# Patient Record
Sex: FEMALE | Race: BLACK OR AFRICAN AMERICAN | NOT HISPANIC OR LATINO
[De-identification: names, ages, dates, MRNs, and addresses within clinical notes are randomized per-mention and may not be internally consistent; named-entity substitution may affect disease eponyms.]

---

## 2017-01-13 ENCOUNTER — APPOINTMENT (OUTPATIENT)
Dept: OPHTHALMOLOGY | Facility: CLINIC | Age: 72
End: 2017-01-13

## 2017-02-13 ENCOUNTER — OUTPATIENT (OUTPATIENT)
Dept: OUTPATIENT SERVICES | Facility: HOSPITAL | Age: 72
LOS: 1 days | Discharge: HOME | End: 2017-02-13

## 2017-02-13 DIAGNOSIS — C93.10 CHRONIC MYELOMONOCYTIC LEUKEMIA NOT HAVING ACHIEVED REMISSION: ICD-10-CM

## 2017-02-13 DIAGNOSIS — Z98.41 CATARACT EXTRACTION STATUS, RIGHT EYE: Chronic | ICD-10-CM

## 2017-02-15 ENCOUNTER — APPOINTMENT (OUTPATIENT)
Dept: HEMATOLOGY ONCOLOGY | Facility: CLINIC | Age: 72
End: 2017-02-15

## 2017-02-28 ENCOUNTER — APPOINTMENT (OUTPATIENT)
Dept: INTERNAL MEDICINE | Facility: CLINIC | Age: 72
End: 2017-02-28

## 2017-02-28 VITALS
WEIGHT: 162 LBS | SYSTOLIC BLOOD PRESSURE: 144 MMHG | BODY MASS INDEX: 29.81 KG/M2 | HEIGHT: 62 IN | DIASTOLIC BLOOD PRESSURE: 80 MMHG

## 2017-02-28 DIAGNOSIS — M25.512 PAIN IN LEFT SHOULDER: ICD-10-CM

## 2017-02-28 DIAGNOSIS — L84 CORNS AND CALLOSITIES: ICD-10-CM

## 2017-03-01 PROBLEM — M25.512 LEFT SHOULDER PAIN: Status: ACTIVE | Noted: 2017-03-01

## 2017-03-01 LAB
ALBUMIN SERPL ELPH-MCNC: 4.1 G/DL
ALP BLD-CCNC: 47 U/L
ALT SERPL-CCNC: 30 U/L
ANION GAP SERPL CALC-SCNC: 19 MMOL/L
AST SERPL-CCNC: 28 U/L
BILIRUB SERPL-MCNC: 0.6 MG/DL
BUN SERPL-MCNC: 10 MG/DL
CALCIUM SERPL-MCNC: 9.4 MG/DL
CHLORIDE SERPL-SCNC: 102 MMOL/L
CHOLEST SERPL-MCNC: 206 MG/DL
CHOLEST/HDLC SERPL: 2.9 RATIO
CO2 SERPL-SCNC: 25 MMOL/L
CREAT SERPL-MCNC: 0.78 MG/DL
GLUCOSE SERPL-MCNC: 94 MG/DL
HDLC SERPL-MCNC: 71 MG/DL
LDLC SERPL CALC-MCNC: 119 MG/DL
POTASSIUM SERPL-SCNC: 3.5 MMOL/L
PROT SERPL-MCNC: 7 G/DL
SODIUM SERPL-SCNC: 146 MMOL/L
TRIGL SERPL-MCNC: 81 MG/DL

## 2017-03-30 ENCOUNTER — FORM ENCOUNTER (OUTPATIENT)
Age: 72
End: 2017-03-30

## 2017-03-31 ENCOUNTER — OUTPATIENT (OUTPATIENT)
Dept: OUTPATIENT SERVICES | Facility: HOSPITAL | Age: 72
LOS: 1 days | End: 2017-03-31
Payer: MEDICARE

## 2017-03-31 ENCOUNTER — APPOINTMENT (OUTPATIENT)
Dept: ULTRASOUND IMAGING | Facility: IMAGING CENTER | Age: 72
End: 2017-03-31

## 2017-03-31 ENCOUNTER — APPOINTMENT (OUTPATIENT)
Dept: MAMMOGRAPHY | Facility: IMAGING CENTER | Age: 72
End: 2017-03-31

## 2017-03-31 DIAGNOSIS — Z12.31 ENCOUNTER FOR SCREENING MAMMOGRAM FOR MALIGNANT NEOPLASM OF BREAST: ICD-10-CM

## 2017-03-31 DIAGNOSIS — Z98.41 CATARACT EXTRACTION STATUS, RIGHT EYE: Chronic | ICD-10-CM

## 2017-03-31 PROCEDURE — 77067 SCR MAMMO BI INCL CAD: CPT

## 2017-03-31 PROCEDURE — 77063 BREAST TOMOSYNTHESIS BI: CPT

## 2017-03-31 PROCEDURE — 76641 ULTRASOUND BREAST COMPLETE: CPT

## 2017-04-11 ENCOUNTER — APPOINTMENT (OUTPATIENT)
Dept: INTERNAL MEDICINE | Facility: CLINIC | Age: 72
End: 2017-04-11

## 2017-04-11 VITALS
DIASTOLIC BLOOD PRESSURE: 100 MMHG | WEIGHT: 155 LBS | BODY MASS INDEX: 28.52 KG/M2 | HEIGHT: 62 IN | SYSTOLIC BLOOD PRESSURE: 130 MMHG

## 2017-04-11 DIAGNOSIS — R09.82 POSTNASAL DRIP: ICD-10-CM

## 2017-05-19 ENCOUNTER — APPOINTMENT (OUTPATIENT)
Dept: OPHTHALMOLOGY | Facility: CLINIC | Age: 72
End: 2017-05-19

## 2017-06-02 ENCOUNTER — RESULT REVIEW (OUTPATIENT)
Age: 72
End: 2017-06-02

## 2017-06-13 ENCOUNTER — APPOINTMENT (OUTPATIENT)
Dept: INTERNAL MEDICINE | Facility: CLINIC | Age: 72
End: 2017-06-13

## 2017-07-03 ENCOUNTER — RX RENEWAL (OUTPATIENT)
Age: 72
End: 2017-07-03

## 2017-08-14 ENCOUNTER — RESULT CHARGE (OUTPATIENT)
Age: 72
End: 2017-08-14

## 2017-08-15 ENCOUNTER — NON-APPOINTMENT (OUTPATIENT)
Age: 72
End: 2017-08-15

## 2017-08-15 ENCOUNTER — APPOINTMENT (OUTPATIENT)
Dept: INTERNAL MEDICINE | Facility: CLINIC | Age: 72
End: 2017-08-15
Payer: MEDICARE

## 2017-08-15 VITALS
HEIGHT: 62 IN | OXYGEN SATURATION: 95 % | SYSTOLIC BLOOD PRESSURE: 133 MMHG | HEART RATE: 70 BPM | BODY MASS INDEX: 27.97 KG/M2 | WEIGHT: 152 LBS | DIASTOLIC BLOOD PRESSURE: 70 MMHG

## 2017-08-15 LAB
GLUCOSE BLDC GLUCOMTR-MCNC: 109
HBA1C MFR BLD HPLC: 5.8

## 2017-08-15 PROCEDURE — 83036 HEMOGLOBIN GLYCOSYLATED A1C: CPT | Mod: QW

## 2017-08-15 PROCEDURE — 82962 GLUCOSE BLOOD TEST: CPT

## 2017-08-15 PROCEDURE — 99397 PER PM REEVAL EST PAT 65+ YR: CPT | Mod: 25

## 2017-08-20 LAB
ALBUMIN SERPL ELPH-MCNC: 4.5 G/DL
ALP BLD-CCNC: 59 U/L
ALT SERPL-CCNC: 23 U/L
ANION GAP SERPL CALC-SCNC: 17 MMOL/L
AST SERPL-CCNC: 18 U/L
BASOPHILS # BLD AUTO: 0.02 K/UL
BASOPHILS NFR BLD AUTO: 0.4 %
BILIRUB SERPL-MCNC: 1.1 MG/DL
BUN SERPL-MCNC: 9 MG/DL
CALCIUM SERPL-MCNC: 9.5 MG/DL
CHLORIDE SERPL-SCNC: 97 MMOL/L
CHOLEST SERPL-MCNC: 283 MG/DL
CHOLEST/HDLC SERPL: 3.5 RATIO
CO2 SERPL-SCNC: 29 MMOL/L
CREAT SERPL-MCNC: 0.75 MG/DL
EOSINOPHIL # BLD AUTO: 0.04 K/UL
EOSINOPHIL NFR BLD AUTO: 0.7 %
GLUCOSE SERPL-MCNC: 98 MG/DL
HCT VFR BLD CALC: 42.3 %
HDLC SERPL-MCNC: 81 MG/DL
HGB BLD-MCNC: 13.5 G/DL
IMM GRANULOCYTES NFR BLD AUTO: 0.2 %
LDLC SERPL CALC-MCNC: 173 MG/DL
LYMPHOCYTES # BLD AUTO: 2.14 K/UL
LYMPHOCYTES NFR BLD AUTO: 39.6 %
MAN DIFF?: NORMAL
MCHC RBC-ENTMCNC: 28 PG
MCHC RBC-ENTMCNC: 31.9 GM/DL
MCV RBC AUTO: 87.8 FL
MONOCYTES # BLD AUTO: 0.45 K/UL
MONOCYTES NFR BLD AUTO: 8.3 %
NEUTROPHILS # BLD AUTO: 2.75 K/UL
NEUTROPHILS NFR BLD AUTO: 50.8 %
PLATELET # BLD AUTO: 224 K/UL
POTASSIUM SERPL-SCNC: 3.2 MMOL/L
PROT SERPL-MCNC: 7.4 G/DL
RBC # BLD: 4.82 M/UL
RBC # FLD: 15.6 %
SODIUM SERPL-SCNC: 143 MMOL/L
TRIGL SERPL-MCNC: 143 MG/DL
TSH SERPL-ACNC: 1.09 UIU/ML
WBC # FLD AUTO: 5.41 K/UL

## 2017-08-22 ENCOUNTER — MEDICATION RENEWAL (OUTPATIENT)
Age: 72
End: 2017-08-22

## 2017-08-25 ENCOUNTER — OUTPATIENT (OUTPATIENT)
Dept: OUTPATIENT SERVICES | Facility: HOSPITAL | Age: 72
LOS: 1 days | Discharge: HOME | End: 2017-08-25

## 2017-08-25 DIAGNOSIS — C93.10 CHRONIC MYELOMONOCYTIC LEUKEMIA NOT HAVING ACHIEVED REMISSION: ICD-10-CM

## 2017-08-25 DIAGNOSIS — Z98.41 CATARACT EXTRACTION STATUS, RIGHT EYE: Chronic | ICD-10-CM

## 2017-09-01 ENCOUNTER — APPOINTMENT (OUTPATIENT)
Dept: HEMATOLOGY ONCOLOGY | Facility: CLINIC | Age: 72
End: 2017-09-01
Payer: MEDICARE

## 2017-09-01 ENCOUNTER — RESULT REVIEW (OUTPATIENT)
Age: 72
End: 2017-09-01

## 2017-09-01 VITALS
HEART RATE: 81 BPM | WEIGHT: 151.02 LBS | BODY MASS INDEX: 27.62 KG/M2 | DIASTOLIC BLOOD PRESSURE: 91 MMHG | SYSTOLIC BLOOD PRESSURE: 166 MMHG | RESPIRATION RATE: 16 BRPM | TEMPERATURE: 97.7 F | OXYGEN SATURATION: 97 %

## 2017-09-01 LAB
HCT VFR BLD CALC: 43.3 % — SIGNIFICANT CHANGE UP (ref 34.5–45)
HGB BLD-MCNC: 14.8 G/DL — SIGNIFICANT CHANGE UP (ref 11.5–15.5)
MCHC RBC-ENTMCNC: 29.9 PG — SIGNIFICANT CHANGE UP (ref 27–34)
MCHC RBC-ENTMCNC: 34.1 G/DL — SIGNIFICANT CHANGE UP (ref 32–36)
MCV RBC AUTO: 87.5 FL — SIGNIFICANT CHANGE UP (ref 80–100)
PLATELET # BLD AUTO: 207 K/UL — SIGNIFICANT CHANGE UP (ref 150–400)
RBC # BLD: 4.95 M/UL — SIGNIFICANT CHANGE UP (ref 3.8–5.2)
RBC # FLD: 12.9 % — SIGNIFICANT CHANGE UP (ref 10.3–14.5)
WBC # BLD: 5.8 K/UL — SIGNIFICANT CHANGE UP (ref 3.8–10.5)
WBC # FLD AUTO: 5.8 K/UL — SIGNIFICANT CHANGE UP (ref 3.8–10.5)

## 2017-09-01 PROCEDURE — 99215 OFFICE O/P EST HI 40 MIN: CPT

## 2017-10-05 ENCOUNTER — APPOINTMENT (OUTPATIENT)
Dept: OPHTHALMOLOGY | Facility: CLINIC | Age: 72
End: 2017-10-05
Payer: MEDICARE

## 2017-10-05 PROCEDURE — 92083 EXTENDED VISUAL FIELD XM: CPT

## 2017-10-05 PROCEDURE — 92012 INTRM OPH EXAM EST PATIENT: CPT

## 2017-12-23 LAB — HEMOCCULT STL QL IA: NEGATIVE

## 2018-02-19 ENCOUNTER — RESULT CHARGE (OUTPATIENT)
Age: 73
End: 2018-02-19

## 2018-02-20 ENCOUNTER — LABORATORY RESULT (OUTPATIENT)
Age: 73
End: 2018-02-20

## 2018-02-20 ENCOUNTER — NON-APPOINTMENT (OUTPATIENT)
Age: 73
End: 2018-02-20

## 2018-02-20 ENCOUNTER — APPOINTMENT (OUTPATIENT)
Dept: INTERNAL MEDICINE | Facility: CLINIC | Age: 73
End: 2018-02-20
Payer: MEDICARE

## 2018-02-20 VITALS
HEART RATE: 74 BPM | BODY MASS INDEX: 28.52 KG/M2 | HEIGHT: 62 IN | WEIGHT: 155 LBS | SYSTOLIC BLOOD PRESSURE: 136 MMHG | DIASTOLIC BLOOD PRESSURE: 100 MMHG | OXYGEN SATURATION: 98 %

## 2018-02-20 DIAGNOSIS — I49.9 CARDIAC ARRHYTHMIA, UNSPECIFIED: ICD-10-CM

## 2018-02-20 DIAGNOSIS — R92.2 INCONCLUSIVE MAMMOGRAM: ICD-10-CM

## 2018-02-20 PROCEDURE — G0008: CPT

## 2018-02-20 PROCEDURE — 90662 IIV NO PRSV INCREASED AG IM: CPT

## 2018-02-20 PROCEDURE — 83036 HEMOGLOBIN GLYCOSYLATED A1C: CPT | Mod: QW

## 2018-02-20 PROCEDURE — 99214 OFFICE O/P EST MOD 30 MIN: CPT | Mod: 25

## 2018-03-08 ENCOUNTER — MEDICATION RENEWAL (OUTPATIENT)
Age: 73
End: 2018-03-08

## 2018-03-12 ENCOUNTER — OUTPATIENT (OUTPATIENT)
Dept: OUTPATIENT SERVICES | Facility: HOSPITAL | Age: 73
LOS: 1 days | Discharge: HOME | End: 2018-03-12

## 2018-03-12 DIAGNOSIS — Z98.41 CATARACT EXTRACTION STATUS, RIGHT EYE: Chronic | ICD-10-CM

## 2018-03-12 DIAGNOSIS — C93.10 CHRONIC MYELOMONOCYTIC LEUKEMIA NOT HAVING ACHIEVED REMISSION: ICD-10-CM

## 2018-03-14 ENCOUNTER — RESULT REVIEW (OUTPATIENT)
Age: 73
End: 2018-03-14

## 2018-03-14 ENCOUNTER — APPOINTMENT (OUTPATIENT)
Dept: HEMATOLOGY ONCOLOGY | Facility: CLINIC | Age: 73
End: 2018-03-14
Payer: MEDICARE

## 2018-03-14 ENCOUNTER — OUTPATIENT (OUTPATIENT)
Dept: OUTPATIENT SERVICES | Facility: HOSPITAL | Age: 73
LOS: 1 days | End: 2018-03-14
Payer: MEDICARE

## 2018-03-14 VITALS
HEART RATE: 82 BPM | RESPIRATION RATE: 16 BRPM | DIASTOLIC BLOOD PRESSURE: 84 MMHG | SYSTOLIC BLOOD PRESSURE: 180 MMHG | WEIGHT: 155.43 LBS | BODY MASS INDEX: 28.43 KG/M2 | TEMPERATURE: 97.9 F | OXYGEN SATURATION: 98 %

## 2018-03-14 DIAGNOSIS — C92.10 CHRONIC MYELOID LEUKEMIA, BCR/ABL-POSITIVE, NOT HAVING ACHIEVED REMISSION: ICD-10-CM

## 2018-03-14 DIAGNOSIS — Z98.41 CATARACT EXTRACTION STATUS, RIGHT EYE: Chronic | ICD-10-CM

## 2018-03-14 LAB
HCT VFR BLD CALC: 40.3 % — SIGNIFICANT CHANGE UP (ref 34.5–45)
HGB BLD-MCNC: 13.7 G/DL — SIGNIFICANT CHANGE UP (ref 11.5–15.5)
MCHC RBC-ENTMCNC: 29.6 PG — SIGNIFICANT CHANGE UP (ref 27–34)
MCHC RBC-ENTMCNC: 33.9 G/DL — SIGNIFICANT CHANGE UP (ref 32–36)
MCV RBC AUTO: 87.3 FL — SIGNIFICANT CHANGE UP (ref 80–100)
PLATELET # BLD AUTO: 180 K/UL — SIGNIFICANT CHANGE UP (ref 150–400)
RBC # BLD: 4.61 M/UL — SIGNIFICANT CHANGE UP (ref 3.8–5.2)
RBC # FLD: 13.4 % — SIGNIFICANT CHANGE UP (ref 10.3–14.5)
WBC # BLD: 5 K/UL — SIGNIFICANT CHANGE UP (ref 3.8–10.5)
WBC # FLD AUTO: 5 K/UL — SIGNIFICANT CHANGE UP (ref 3.8–10.5)

## 2018-03-14 PROCEDURE — 99215 OFFICE O/P EST HI 40 MIN: CPT

## 2018-03-14 PROCEDURE — 81206 BCR/ABL1 GENE MAJOR BP: CPT

## 2018-03-14 PROCEDURE — G0452: CPT | Mod: 26

## 2018-03-16 LAB — BCR/ABL BY RT - PCR QUANTITATIVE: SIGNIFICANT CHANGE UP

## 2018-04-19 ENCOUNTER — APPOINTMENT (OUTPATIENT)
Dept: OPHTHALMOLOGY | Facility: CLINIC | Age: 73
End: 2018-04-19
Payer: MEDICARE

## 2018-04-19 PROCEDURE — 92250 FUNDUS PHOTOGRAPHY W/I&R: CPT

## 2018-04-19 PROCEDURE — 92014 COMPRE OPH EXAM EST PT 1/>: CPT

## 2018-06-20 ENCOUNTER — APPOINTMENT (OUTPATIENT)
Dept: OPHTHALMOLOGY | Facility: CLINIC | Age: 73
End: 2018-06-20

## 2018-07-12 ENCOUNTER — OUTPATIENT (OUTPATIENT)
Dept: OUTPATIENT SERVICES | Facility: HOSPITAL | Age: 73
LOS: 1 days | Discharge: HOME | End: 2018-07-12

## 2018-07-12 DIAGNOSIS — C93.10 CHRONIC MYELOMONOCYTIC LEUKEMIA NOT HAVING ACHIEVED REMISSION: ICD-10-CM

## 2018-07-12 DIAGNOSIS — Z98.41 CATARACT EXTRACTION STATUS, RIGHT EYE: Chronic | ICD-10-CM

## 2018-07-16 ENCOUNTER — RESULT REVIEW (OUTPATIENT)
Age: 73
End: 2018-07-16

## 2018-07-16 ENCOUNTER — APPOINTMENT (OUTPATIENT)
Dept: HEMATOLOGY ONCOLOGY | Facility: CLINIC | Age: 73
End: 2018-07-16
Payer: MEDICARE

## 2018-07-16 VITALS
RESPIRATION RATE: 16 BRPM | OXYGEN SATURATION: 100 % | BODY MASS INDEX: 28.39 KG/M2 | WEIGHT: 155.2 LBS | DIASTOLIC BLOOD PRESSURE: 87 MMHG | TEMPERATURE: 98.1 F | SYSTOLIC BLOOD PRESSURE: 145 MMHG | HEART RATE: 69 BPM

## 2018-07-16 LAB
HCT VFR BLD CALC: 39.4 % — SIGNIFICANT CHANGE UP (ref 34.5–45)
HGB BLD-MCNC: 13.7 G/DL — SIGNIFICANT CHANGE UP (ref 11.5–15.5)
MCHC RBC-ENTMCNC: 30.7 PG — SIGNIFICANT CHANGE UP (ref 27–34)
MCHC RBC-ENTMCNC: 34.9 G/DL — SIGNIFICANT CHANGE UP (ref 32–36)
MCV RBC AUTO: 88 FL — SIGNIFICANT CHANGE UP (ref 80–100)
PLATELET # BLD AUTO: 208 K/UL — SIGNIFICANT CHANGE UP (ref 150–400)
RBC # BLD: 4.47 M/UL — SIGNIFICANT CHANGE UP (ref 3.8–5.2)
RBC # FLD: 12.7 % — SIGNIFICANT CHANGE UP (ref 10.3–14.5)
WBC # BLD: 5.4 K/UL — SIGNIFICANT CHANGE UP (ref 3.8–10.5)
WBC # FLD AUTO: 5.4 K/UL — SIGNIFICANT CHANGE UP (ref 3.8–10.5)

## 2018-07-16 PROCEDURE — 99215 OFFICE O/P EST HI 40 MIN: CPT

## 2018-07-17 LAB
ALBUMIN SERPL ELPH-MCNC: 4.5 G/DL
ALP BLD-CCNC: 63 U/L
ALT SERPL-CCNC: 44 U/L
ANION GAP SERPL CALC-SCNC: 14 MMOL/L
AST SERPL-CCNC: 31 U/L
BILIRUB SERPL-MCNC: 0.9 MG/DL
BUN SERPL-MCNC: 13 MG/DL
CALCIUM SERPL-MCNC: 9.2 MG/DL
CHLORIDE SERPL-SCNC: 102 MMOL/L
CO2 SERPL-SCNC: 25 MMOL/L
CREAT SERPL-MCNC: 0.88 MG/DL
GLUCOSE SERPL-MCNC: 81 MG/DL
POTASSIUM SERPL-SCNC: 4.1 MMOL/L
PROT SERPL-MCNC: 7 G/DL
SODIUM SERPL-SCNC: 141 MMOL/L

## 2018-07-19 ENCOUNTER — APPOINTMENT (OUTPATIENT)
Dept: INTERNAL MEDICINE | Facility: CLINIC | Age: 73
End: 2018-07-19
Payer: MEDICARE

## 2018-07-19 VITALS
HEIGHT: 62 IN | DIASTOLIC BLOOD PRESSURE: 70 MMHG | HEART RATE: 70 BPM | OXYGEN SATURATION: 98 % | BODY MASS INDEX: 28.34 KG/M2 | SYSTOLIC BLOOD PRESSURE: 136 MMHG | WEIGHT: 154 LBS

## 2018-07-19 PROCEDURE — 99214 OFFICE O/P EST MOD 30 MIN: CPT

## 2018-07-20 NOTE — PHYSICAL EXAM
[No Acute Distress] : no acute distress [Well-Appearing] : well-appearing [Normal Sclera/Conjunctiva] : normal sclera/conjunctiva [EOMI] : extraocular movements intact [Normal Outer Ear/Nose] : the outer ears and nose were normal in appearance [Normal Oropharynx] : the oropharynx was normal [Supple] : supple [No Lymphadenopathy] : no lymphadenopathy [No Respiratory Distress] : no respiratory distress  [Clear to Auscultation] : lungs were clear to auscultation bilaterally [Normal Rate] : normal rate  [Regular Rhythm] : with a regular rhythm [Normal S1, S2] : normal S1 and S2 [No Edema] : there was no peripheral edema [Soft] : abdomen soft [Non Tender] : non-tender [Normal Bowel Sounds] : normal bowel sounds [Normal Anterior Cervical Nodes] : no anterior cervical lymphadenopathy [No CVA Tenderness] : no CVA  tenderness [No Spinal Tenderness] : no spinal tenderness [No Joint Swelling] : no joint swelling [Grossly Normal Strength/Tone] : grossly normal strength/tone [Coordination Grossly Intact] : coordination grossly intact [No Focal Deficits] : no focal deficits [Deep Tendon Reflexes (DTR)] : deep tendon reflexes were 2+ and symmetric [Normal Affect] : the affect was normal [Normal Insight/Judgement] : insight and judgment were intact

## 2018-07-20 NOTE — REVIEW OF SYSTEMS
[Easy Bleeding] : no easy bleeding [Easy Bruising] : no easy bruising [Negative] : Neurological [de-identified] : see hpi

## 2018-07-20 NOTE — HISTORY OF PRESENT ILLNESS
[de-identified] : here for f/u of HTN, HLD.  h/o CML doing well on current regimen. Lipids elevation however she is not interested in taking medication for cholesterol. \par mildly elevated A1c in past.\par hasn’t gotten the mammo yet but wants to go. Also now willing to go ahead and get sono of liver.\par Her daughter passed away recently - has been difficult for her and extended family.  has been hard to do much while they were all struggling with the initial grief. - did started seeing grief counselor last week and will continue to do so as it is helping.

## 2018-07-20 NOTE — PLAN
[FreeTextEntry1] : ordered mammo and sono in February - will update order to today and print out for her.

## 2018-08-03 ENCOUNTER — OTHER (OUTPATIENT)
Age: 73
End: 2018-08-03

## 2018-08-09 ENCOUNTER — FORM ENCOUNTER (OUTPATIENT)
Age: 73
End: 2018-08-09

## 2018-08-10 ENCOUNTER — APPOINTMENT (OUTPATIENT)
Dept: ULTRASOUND IMAGING | Facility: IMAGING CENTER | Age: 73
End: 2018-08-10
Payer: MEDICARE

## 2018-08-10 ENCOUNTER — APPOINTMENT (OUTPATIENT)
Dept: MAMMOGRAPHY | Facility: IMAGING CENTER | Age: 73
End: 2018-08-10
Payer: MEDICARE

## 2018-08-10 ENCOUNTER — OUTPATIENT (OUTPATIENT)
Dept: OUTPATIENT SERVICES | Facility: HOSPITAL | Age: 73
LOS: 1 days | End: 2018-08-10
Payer: MEDICARE

## 2018-08-10 DIAGNOSIS — Z00.00 ENCOUNTER FOR GENERAL ADULT MEDICAL EXAMINATION WITHOUT ABNORMAL FINDINGS: ICD-10-CM

## 2018-08-10 DIAGNOSIS — Z98.41 CATARACT EXTRACTION STATUS, RIGHT EYE: Chronic | ICD-10-CM

## 2018-08-10 PROCEDURE — 76641 ULTRASOUND BREAST COMPLETE: CPT | Mod: 26,50

## 2018-08-10 PROCEDURE — 77067 SCR MAMMO BI INCL CAD: CPT | Mod: 26

## 2018-08-10 PROCEDURE — 77067 SCR MAMMO BI INCL CAD: CPT

## 2018-08-10 PROCEDURE — 77063 BREAST TOMOSYNTHESIS BI: CPT | Mod: 26

## 2018-08-10 PROCEDURE — 77063 BREAST TOMOSYNTHESIS BI: CPT

## 2018-08-10 PROCEDURE — 76641 ULTRASOUND BREAST COMPLETE: CPT

## 2018-08-15 ENCOUNTER — RX RENEWAL (OUTPATIENT)
Age: 73
End: 2018-08-15

## 2018-09-27 ENCOUNTER — APPOINTMENT (OUTPATIENT)
Dept: INTERNAL MEDICINE | Facility: CLINIC | Age: 73
End: 2018-09-27

## 2018-09-27 ENCOUNTER — APPOINTMENT (OUTPATIENT)
Dept: INTERNAL MEDICINE | Facility: CLINIC | Age: 73
End: 2018-09-27
Payer: MEDICARE

## 2018-09-27 VITALS
WEIGHT: 153 LBS | SYSTOLIC BLOOD PRESSURE: 116 MMHG | BODY MASS INDEX: 27.98 KG/M2 | HEART RATE: 68 BPM | DIASTOLIC BLOOD PRESSURE: 70 MMHG | OXYGEN SATURATION: 98 %

## 2018-09-27 PROCEDURE — 99213 OFFICE O/P EST LOW 20 MIN: CPT

## 2018-09-28 NOTE — HISTORY OF PRESENT ILLNESS
[FreeTextEntry1] : followup HTN [de-identified] : \par Pt is here for her HTN followup. Reports no symptoms and is taking her meds.

## 2018-09-28 NOTE — PHYSICAL EXAM
[No Acute Distress] : no acute distress [No Respiratory Distress] : no respiratory distress  [Clear to Auscultation] : lungs were clear to auscultation bilaterally [No Accessory Muscle Use] : no accessory muscle use [Normal Rate] : normal rate  [Regular Rhythm] : with a regular rhythm [Normal S1, S2] : normal S1 and S2 [Soft] : abdomen soft [Non Tender] : non-tender [Normal Bowel Sounds] : normal bowel sounds

## 2018-10-04 ENCOUNTER — MEDICATION RENEWAL (OUTPATIENT)
Age: 73
End: 2018-10-04

## 2018-10-17 ENCOUNTER — APPOINTMENT (OUTPATIENT)
Dept: OPHTHALMOLOGY | Facility: CLINIC | Age: 73
End: 2018-10-17
Payer: MEDICARE

## 2018-10-17 DIAGNOSIS — H35.371 PUCKERING OF MACULA, RIGHT EYE: ICD-10-CM

## 2018-10-17 DIAGNOSIS — H26.491 OTHER SECONDARY CATARACT, RIGHT EYE: ICD-10-CM

## 2018-10-17 PROCEDURE — 99212 OFFICE O/P EST SF 10 MIN: CPT

## 2018-10-17 PROCEDURE — 92133 CPTRZD OPH DX IMG PST SGM ON: CPT

## 2018-10-17 PROCEDURE — 92083 EXTENDED VISUAL FIELD XM: CPT

## 2018-10-17 RX ORDER — TIMOLOL MALEATE 5 MG/ML
0.5 SOLUTION OPHTHALMIC
Qty: 1 | Refills: 5 | Status: ACTIVE | COMMUNITY
Start: 2018-10-17 | End: 1900-01-01

## 2018-10-23 ENCOUNTER — APPOINTMENT (OUTPATIENT)
Dept: INTERNAL MEDICINE | Facility: CLINIC | Age: 73
End: 2018-10-23

## 2018-11-09 ENCOUNTER — OUTPATIENT (OUTPATIENT)
Dept: OUTPATIENT SERVICES | Facility: HOSPITAL | Age: 73
LOS: 1 days | Discharge: HOME | End: 2018-11-09

## 2018-11-09 DIAGNOSIS — Z98.41 CATARACT EXTRACTION STATUS, RIGHT EYE: Chronic | ICD-10-CM

## 2018-11-09 DIAGNOSIS — C93.10 CHRONIC MYELOMONOCYTIC LEUKEMIA NOT HAVING ACHIEVED REMISSION: ICD-10-CM

## 2018-11-20 ENCOUNTER — APPOINTMENT (OUTPATIENT)
Dept: HEMATOLOGY ONCOLOGY | Facility: CLINIC | Age: 73
End: 2018-11-20

## 2018-12-21 ENCOUNTER — OUTPATIENT (OUTPATIENT)
Dept: OUTPATIENT SERVICES | Facility: HOSPITAL | Age: 73
LOS: 1 days | Discharge: HOME | End: 2018-12-21

## 2018-12-21 DIAGNOSIS — Z98.41 CATARACT EXTRACTION STATUS, RIGHT EYE: Chronic | ICD-10-CM

## 2018-12-21 DIAGNOSIS — C93.10 CHRONIC MYELOMONOCYTIC LEUKEMIA NOT HAVING ACHIEVED REMISSION: ICD-10-CM

## 2019-01-08 ENCOUNTER — RESULT REVIEW (OUTPATIENT)
Age: 74
End: 2019-01-08

## 2019-01-08 ENCOUNTER — OUTPATIENT (OUTPATIENT)
Dept: OUTPATIENT SERVICES | Facility: HOSPITAL | Age: 74
LOS: 1 days | End: 2019-01-08
Payer: MEDICARE

## 2019-01-08 ENCOUNTER — APPOINTMENT (OUTPATIENT)
Dept: HEMATOLOGY ONCOLOGY | Facility: CLINIC | Age: 74
End: 2019-01-08
Payer: MEDICARE

## 2019-01-08 VITALS
BODY MASS INDEX: 28.26 KG/M2 | TEMPERATURE: 97.6 F | DIASTOLIC BLOOD PRESSURE: 114 MMHG | RESPIRATION RATE: 16 BRPM | SYSTOLIC BLOOD PRESSURE: 195 MMHG | HEART RATE: 84 BPM | WEIGHT: 154.52 LBS | OXYGEN SATURATION: 100 %

## 2019-01-08 DIAGNOSIS — Z98.41 CATARACT EXTRACTION STATUS, RIGHT EYE: Chronic | ICD-10-CM

## 2019-01-08 DIAGNOSIS — C92.10 CHRONIC MYELOID LEUKEMIA, BCR/ABL-POSITIVE, NOT HAVING ACHIEVED REMISSION: ICD-10-CM

## 2019-01-08 LAB
ALBUMIN SERPL ELPH-MCNC: 4.5 G/DL
ALP BLD-CCNC: 56 U/L
ALT SERPL-CCNC: 15 U/L
ANION GAP SERPL CALC-SCNC: 16 MMOL/L
AST SERPL-CCNC: 16 U/L
BILIRUB SERPL-MCNC: 0.8 MG/DL
BUN SERPL-MCNC: 9 MG/DL
CALCIUM SERPL-MCNC: 9.1 MG/DL
CHLORIDE SERPL-SCNC: 100 MMOL/L
CO2 SERPL-SCNC: 27 MMOL/L
CREAT SERPL-MCNC: 0.76 MG/DL
GLUCOSE SERPL-MCNC: 92 MG/DL
HCT VFR BLD CALC: 39.5 % — SIGNIFICANT CHANGE UP (ref 34.5–45)
HGB BLD-MCNC: 13.2 G/DL — SIGNIFICANT CHANGE UP (ref 11.5–15.5)
MCHC RBC-ENTMCNC: 28.9 PG — SIGNIFICANT CHANGE UP (ref 27–34)
MCHC RBC-ENTMCNC: 33.4 G/DL — SIGNIFICANT CHANGE UP (ref 32–36)
MCV RBC AUTO: 86.6 FL — SIGNIFICANT CHANGE UP (ref 80–100)
PLATELET # BLD AUTO: 200 K/UL — SIGNIFICANT CHANGE UP (ref 150–400)
POTASSIUM SERPL-SCNC: 3.4 MMOL/L
PROT SERPL-MCNC: 7.1 G/DL
RBC # BLD: 4.56 M/UL — SIGNIFICANT CHANGE UP (ref 3.8–5.2)
RBC # FLD: 13.1 % — SIGNIFICANT CHANGE UP (ref 10.3–14.5)
SODIUM SERPL-SCNC: 143 MMOL/L
WBC # BLD: 5.7 K/UL — SIGNIFICANT CHANGE UP (ref 3.8–10.5)
WBC # FLD AUTO: 5.7 K/UL — SIGNIFICANT CHANGE UP (ref 3.8–10.5)

## 2019-01-08 PROCEDURE — G0452: CPT | Mod: 26

## 2019-01-08 PROCEDURE — 99215 OFFICE O/P EST HI 40 MIN: CPT

## 2019-01-08 PROCEDURE — 81206 BCR/ABL1 GENE MAJOR BP: CPT

## 2019-01-08 NOTE — ASSESSMENT
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

## 2019-01-11 LAB — BCR/ABL BY RT - PCR QUANTITATIVE: SIGNIFICANT CHANGE UP

## 2019-01-22 ENCOUNTER — APPOINTMENT (OUTPATIENT)
Dept: INTERNAL MEDICINE | Facility: CLINIC | Age: 74
End: 2019-01-22
Payer: MEDICARE

## 2019-01-22 ENCOUNTER — RESULT CHARGE (OUTPATIENT)
Age: 74
End: 2019-01-22

## 2019-01-22 ENCOUNTER — NON-APPOINTMENT (OUTPATIENT)
Age: 74
End: 2019-01-22

## 2019-01-22 VITALS
DIASTOLIC BLOOD PRESSURE: 88 MMHG | HEART RATE: 93 BPM | WEIGHT: 158 LBS | SYSTOLIC BLOOD PRESSURE: 160 MMHG | HEIGHT: 62 IN | OXYGEN SATURATION: 97 % | BODY MASS INDEX: 29.08 KG/M2

## 2019-01-22 DIAGNOSIS — L98.9 DISORDER OF THE SKIN AND SUBCUTANEOUS TISSUE, UNSPECIFIED: ICD-10-CM

## 2019-01-22 PROCEDURE — 93000 ELECTROCARDIOGRAM COMPLETE: CPT

## 2019-01-22 PROCEDURE — G0444 DEPRESSION SCREEN ANNUAL: CPT

## 2019-01-22 PROCEDURE — G0439: CPT | Mod: 25

## 2019-01-22 PROCEDURE — 90662 IIV NO PRSV INCREASED AG IM: CPT

## 2019-01-22 PROCEDURE — G0442 ANNUAL ALCOHOL SCREEN 15 MIN: CPT

## 2019-01-22 PROCEDURE — G0008: CPT

## 2019-01-23 PROBLEM — L98.9 SCALP LESION: Status: ACTIVE | Noted: 2019-01-22

## 2019-01-23 LAB
ALBUMIN SERPL ELPH-MCNC: 4.5 G/DL
ALP BLD-CCNC: 58 U/L
ALT SERPL-CCNC: 16 U/L
ANION GAP SERPL CALC-SCNC: 9 MMOL/L
AST SERPL-CCNC: 19 U/L
BILIRUB SERPL-MCNC: 0.4 MG/DL
BUN SERPL-MCNC: 12 MG/DL
CALCIUM SERPL-MCNC: 9.8 MG/DL
CHLORIDE SERPL-SCNC: 102 MMOL/L
CHOLEST SERPL-MCNC: 250 MG/DL
CHOLEST/HDLC SERPL: 3.2 RATIO
CO2 SERPL-SCNC: 28 MMOL/L
CREAT SERPL-MCNC: 0.81 MG/DL
GLUCOSE SERPL-MCNC: 77 MG/DL
HBA1C MFR BLD HPLC: 5.9 %
HCV AB SER QL: NONREACTIVE
HCV S/CO RATIO: 0.06 S/CO
HDLC SERPL-MCNC: 78 MG/DL
HIV1+2 AB SPEC QL IA.RAPID: NONREACTIVE
LDLC SERPL CALC-MCNC: 142 MG/DL
POTASSIUM SERPL-SCNC: 3.6 MMOL/L
PROT SERPL-MCNC: 7.6 G/DL
SODIUM SERPL-SCNC: 139 MMOL/L
TRIGL SERPL-MCNC: 151 MG/DL

## 2019-01-23 NOTE — REVIEW OF SYSTEMS
[Incontinence] : incontinence [Negative] : Neurological [Dysuria] : no dysuria [Itching] : no itching [Skin Rash] : no skin rash [Suicidal] : not suicidal [Easy Bleeding] : no easy bleeding [Easy Bruising] : no easy bruising [de-identified] : skin lesions on scalp

## 2019-01-23 NOTE — COUNSELING
[Activity counseling provided] : activity [None] : None [Good understanding] : Patient has a good understanding of lifestyle changes and the steps needed to achieve self management goals [ - Annual Alcohol Misuse Screening] : Annual Alcohol Misuse Screening [ - Annual Depression Screening] : Annual Depression Screening [de-identified] : Activities to reduce stress explored together in detail. Including regular exercise, dancing, yoga, meditation.  Strategies discussed. [facility] she [facility] her granddaughter off at school in the morning where they have dance classes, a pool, yoga. \par \par Depression screen negative.  Key questions from PHQ 9 explored.  Screening not currently suggestive of diagnosis of MDD. \par \par Alcohol use within healthy limits.  Healthy drinking guidelines shared with patient (Female and male overage 65 no more than 3 SSD on any day, no more than 7 per week). Positive reinforcement provided given patient currently within healthy guidelines.

## 2019-01-23 NOTE — HISTORY OF PRESENT ILLNESS
[FreeTextEntry1] : Here for annual wellness exam [de-identified] : health update: still has not been able to get the US of her liver. \par Under a fair amount of stress at home - has issues with one of her daughters and  not well.  Also helps take care of one of her neighbors who is diabetic.

## 2019-01-23 NOTE — PHYSICAL EXAM
[No Acute Distress] : no acute distress [Well-Appearing] : well-appearing [Normal Sclera/Conjunctiva] : normal sclera/conjunctiva [PERRL] : pupils equal round and reactive to light [EOMI] : extraocular movements intact [Normal Outer Ear/Nose] : the outer ears and nose were normal in appearance [Normal Oropharynx] : the oropharynx was normal [Supple] : supple [No Lymphadenopathy] : no lymphadenopathy [No Respiratory Distress] : no respiratory distress  [Clear to Auscultation] : lungs were clear to auscultation bilaterally [Normal Rate] : normal rate  [Regular Rhythm] : with a regular rhythm [Normal S1, S2] : normal S1 and S2 [No Murmur] : no murmur heard [No Carotid Bruits] : no carotid bruits [Pedal Pulses Present] : the pedal pulses are present [No Edema] : there was no peripheral edema [Normal Appearance] : normal in appearance [No Masses] : no palpable masses [No Axillary Lymphadenopathy] : no axillary lymphadenopathy [Soft] : abdomen soft [Non Tender] : non-tender [Normal Bowel Sounds] : normal bowel sounds [Normal Axillary Nodes] : no axillary lymphadenopathy [Normal Posterior Cervical Nodes] : no posterior cervical lymphadenopathy [Normal Anterior Cervical Nodes] : no anterior cervical lymphadenopathy [No CVA Tenderness] : no CVA  tenderness [No Spinal Tenderness] : no spinal tenderness [No Joint Swelling] : no joint swelling [Grossly Normal Strength/Tone] : grossly normal strength/tone [No Rash] : no rash [Coordination Grossly Intact] : coordination grossly intact [No Focal Deficits] : no focal deficits [Deep Tendon Reflexes (DTR)] : deep tendon reflexes were 2+ and symmetric [Normal Affect] : the affect was normal [Normal Insight/Judgement] : insight and judgment were intact [de-identified] : neck non-tender [de-identified] : no skin changes [de-identified] : 2 darkly pigmented lesions on scalp - anterior ~1.5 cm, with stuck on appearance of SK. 2nd ~ 1 cm flesh colored lesion

## 2019-01-23 NOTE — HEALTH RISK ASSESSMENT
[No falls in past year] : Patient reported no falls in the past year [0] : 2) Feeling down, depressed, or hopeless: Not at all (0) [HIV Test offered] : HIV Test offered [Hepatitis C test offered] : Hepatitis C test offered [None] : None [Retired] : retired [] :  [Feels Safe at Home] : Feels safe at home [Fully functional (bathing, dressing, toileting, transferring, walking, feeding)] : Fully functional (bathing, dressing, toileting, transferring, walking, feeding) [Fully functional (using the telephone, shopping, preparing meals, housekeeping, doing laundry, using] : Fully functional and needs no help or supervision to perform IADLs (using the telephone, shopping, preparing meals, housekeeping, doing laundry, using transportation, managing medications and managing finances) [Smoke Detector] : smoke detector [Carbon Monoxide Detector] : carbon monoxide detector [Seat Belt] :  uses seat belt [Discussed at today's visit] : Advance Directives Discussed at today's visit [Patient reported mammogram was normal] : Patient reported mammogram was normal [Patient reported colonoscopy was normal] : Patient reported colonoscopy was normal [With Family] : lives with family [FreeTextEntry1] : occ gets pain in the occipital area. Ran out of metoprolol so BP has been rinning a bit high. [] : No [de-identified] : See's hem/onc rregularly [de-identified] : occ - glass of wine on occasion [Change in mental status noted] : No change in mental status noted [Sexually Active] : not sexually active [Reports changes in hearing] : Reports no changes in hearing [Reports changes in vision] : Reports no changes in vision [Guns at Home] : no guns at home [MammogramDate] : 08/18 [PapSmearComments] : all normal [BoneDensityDate] : 10/10 [ColonoscopyDate] : 10/10 [FreeTextEntry4] : Health Care Proxy form given - reviewed role of proxy and relevance including: Health Care Proxy allows you to appoint someone you trust  to make health care decisions for you if you lose the ability to make decisions yourself. By appointing a health care agent, you can ensure your wishes will be followed. You may allow your agent to make all health care decisions or only certain ones. The proxy form can also be used to document your wishes or instructions with regard to organ and/or tissue donation.\par Agree as patients physician to support expressed wishes.

## 2019-01-23 NOTE — DISCUSSION/SUMMARY
[Subsequent Annual Wellness] : Subsequent Annual Wellness Visit [Preventive Exam & Counseling Completed] : with preventive exam as well as age and risk appropriate counseling completed [EKG] : EKG recommended [Blood Glucose] : due for blood glucose [DXA Axial] : due for DXA axial skeleton [Screening Not Indicated] : screening not indicated [Screening Current] : screening is current [Risks and Benefits Discussed] : the risks and benefits of screening were discussed [Influenza Due Today] : influenza vaccine is due today [Influenza Recommended Annually] : influenza vaccination is recommended annually [Lifetime Vaccine Completed] : the lifetime pneumococcal vaccine has been completed [Series Not Indicated, Low Risk] : hepatitis B vaccination series is not indicated at this time due to the patient's low risk of tete the disease [Tdap Vaccine UTD] : Tdap vaccination up to date [Paperwork & Instructions Given] : paperwork and instructions were given to the patient [Follow-Up in ___] : follow-up visit needed in [unfilled] [de-identified] : HbA1c [de-identified] : referred to Gyn.  Has not had PAP in many years - to have one and as long as fine, likely will not need any more.  [de-identified] : denise unavailable

## 2019-02-12 LAB — HEMOCCULT STL QL IA: NEGATIVE

## 2019-02-21 ENCOUNTER — APPOINTMENT (OUTPATIENT)
Dept: DERMATOLOGY | Facility: CLINIC | Age: 74
End: 2019-02-21
Payer: MEDICARE

## 2019-02-21 VITALS — WEIGHT: 154 LBS | DIASTOLIC BLOOD PRESSURE: 80 MMHG | BODY MASS INDEX: 28.17 KG/M2 | SYSTOLIC BLOOD PRESSURE: 140 MMHG

## 2019-02-21 DIAGNOSIS — Z86.69 PERSONAL HISTORY OF OTHER DISEASES OF THE NERVOUS SYSTEM AND SENSE ORGANS: ICD-10-CM

## 2019-02-21 DIAGNOSIS — Z86.79 PERSONAL HISTORY OF OTHER DISEASES OF THE CIRCULATORY SYSTEM: ICD-10-CM

## 2019-02-21 DIAGNOSIS — L82.1 OTHER SEBORRHEIC KERATOSIS: ICD-10-CM

## 2019-02-21 DIAGNOSIS — Z91.89 OTHER SPECIFIED PERSONAL RISK FACTORS, NOT ELSEWHERE CLASSIFIED: ICD-10-CM

## 2019-02-21 DIAGNOSIS — L30.0 NUMMULAR DERMATITIS: ICD-10-CM

## 2019-02-21 PROCEDURE — 99202 OFFICE O/P NEW SF 15 MIN: CPT

## 2019-03-05 ENCOUNTER — APPOINTMENT (OUTPATIENT)
Dept: INTERNAL MEDICINE | Facility: CLINIC | Age: 74
End: 2019-03-05

## 2019-03-07 ENCOUNTER — RX RENEWAL (OUTPATIENT)
Age: 74
End: 2019-03-07

## 2019-04-02 ENCOUNTER — APPOINTMENT (OUTPATIENT)
Dept: INTERNAL MEDICINE | Facility: CLINIC | Age: 74
End: 2019-04-02
Payer: MEDICARE

## 2019-04-02 VITALS
WEIGHT: 155 LBS | HEART RATE: 68 BPM | HEIGHT: 62 IN | SYSTOLIC BLOOD PRESSURE: 122 MMHG | DIASTOLIC BLOOD PRESSURE: 84 MMHG | BODY MASS INDEX: 28.52 KG/M2 | OXYGEN SATURATION: 96 %

## 2019-04-02 PROCEDURE — 99214 OFFICE O/P EST MOD 30 MIN: CPT

## 2019-04-04 ENCOUNTER — APPOINTMENT (OUTPATIENT)
Dept: OBGYN | Facility: CLINIC | Age: 74
End: 2019-04-04
Payer: MEDICARE

## 2019-04-04 VITALS
SYSTOLIC BLOOD PRESSURE: 130 MMHG | BODY MASS INDEX: 28.34 KG/M2 | WEIGHT: 154 LBS | DIASTOLIC BLOOD PRESSURE: 82 MMHG | HEIGHT: 62 IN

## 2019-04-04 DIAGNOSIS — Z01.419 ENCOUNTER FOR GYNECOLOGICAL EXAMINATION (GENERAL) (ROUTINE) W/OUT ABNORMAL FINDINGS: ICD-10-CM

## 2019-04-04 DIAGNOSIS — Z84.89 FAMILY HISTORY OF OTHER SPECIFIED CONDITIONS: ICD-10-CM

## 2019-04-04 DIAGNOSIS — Z81.8 FAMILY HISTORY OF OTHER MENTAL AND BEHAVIORAL DISORDERS: ICD-10-CM

## 2019-04-04 PROCEDURE — G0101: CPT

## 2019-04-04 RX ORDER — ATORVASTATIN CALCIUM 20 MG/1
20 TABLET, FILM COATED ORAL DAILY
Qty: 30 | Refills: 2 | Status: COMPLETED | COMMUNITY
Start: 2017-02-28 | End: 2019-04-04

## 2019-04-04 RX ORDER — FLUTICASONE PROPIONATE 50 UG/1
50 SPRAY, METERED NASAL DAILY
Qty: 1 | Refills: 2 | Status: COMPLETED | COMMUNITY
Start: 2017-04-11 | End: 2019-04-04

## 2019-04-04 NOTE — PHYSICAL EXAM
[Awake] : awake [Alert] : alert [Soft] : soft [Oriented x3] : oriented to person, place, and time [Normal] : cervix [No Bleeding] : there was no active vaginal bleeding [Absent] : absent [Acute Distress] : no acute distress [Mass] : no breast mass [Nipple Discharge] : no nipple discharge [Axillary LAD] : no axillary lymphadenopathy [Tender] : non tender [Adnexa Tenderness] : were not tender [Ovarian Mass (___ Cm)] : there were no adnexal masses

## 2019-04-07 NOTE — REVIEW OF SYSTEMS
[Negative] : Psychiatric [Suicidal] : not suicidal [Easy Bleeding] : no easy bleeding [Easy Bruising] : no easy bruising [Swollen Glands] : no swollen glands [de-identified] : saw Derm RE scalp lesions

## 2019-04-07 NOTE — HISTORY OF PRESENT ILLNESS
[Hyperlipidemia] : Hyperlipidemia [Hypertension] : Hypertension [Checks BP Sporadically] : The patient checks ~his/her~ blood pressure sporadically [<130/90] : Target blood pressure is  <130/90 [Lorenzoifestyle management] : lifestyle management [Patient declined therapy] : Patient declined statin therapy [FreeTextEntry6] : Has appt for gyn and appt for US liver pending

## 2019-04-07 NOTE — PHYSICAL EXAM
[No Acute Distress] : no acute distress [Well-Appearing] : well-appearing [Normal Sclera/Conjunctiva] : normal sclera/conjunctiva [PERRL] : pupils equal round and reactive to light [EOMI] : extraocular movements intact [Normal Outer Ear/Nose] : the outer ears and nose were normal in appearance [Normal Oropharynx] : the oropharynx was normal [Supple] : supple [No Lymphadenopathy] : no lymphadenopathy [No Respiratory Distress] : no respiratory distress  [Clear to Auscultation] : lungs were clear to auscultation bilaterally [Normal Rate] : normal rate  [Regular Rhythm] : with a regular rhythm [Normal S1, S2] : normal S1 and S2 [No Murmur] : no murmur heard [No Edema] : there was no peripheral edema [Soft] : abdomen soft [Non Tender] : non-tender [Normal Bowel Sounds] : normal bowel sounds [Normal Anterior Cervical Nodes] : no anterior cervical lymphadenopathy [No CVA Tenderness] : no CVA  tenderness [No Spinal Tenderness] : no spinal tenderness [No Joint Swelling] : no joint swelling [Grossly Normal Strength/Tone] : grossly normal strength/tone [No Rash] : no rash [Coordination Grossly Intact] : coordination grossly intact [No Focal Deficits] : no focal deficits [Deep Tendon Reflexes (DTR)] : deep tendon reflexes were 2+ and symmetric [Normal Affect] : the affect was normal [Normal Insight/Judgement] : insight and judgment were intact

## 2019-04-09 ENCOUNTER — FORM ENCOUNTER (OUTPATIENT)
Age: 74
End: 2019-04-09

## 2019-04-10 ENCOUNTER — APPOINTMENT (OUTPATIENT)
Dept: ULTRASOUND IMAGING | Facility: IMAGING CENTER | Age: 74
End: 2019-04-10
Payer: MEDICARE

## 2019-04-10 ENCOUNTER — OUTPATIENT (OUTPATIENT)
Dept: OUTPATIENT SERVICES | Facility: HOSPITAL | Age: 74
LOS: 1 days | End: 2019-04-10
Payer: MEDICARE

## 2019-04-10 DIAGNOSIS — Z98.41 CATARACT EXTRACTION STATUS, RIGHT EYE: Chronic | ICD-10-CM

## 2019-04-10 DIAGNOSIS — D13.4 BENIGN NEOPLASM OF LIVER: ICD-10-CM

## 2019-04-10 PROCEDURE — 76700 US EXAM ABDOM COMPLETE: CPT | Mod: 26

## 2019-04-10 PROCEDURE — 76700 US EXAM ABDOM COMPLETE: CPT

## 2019-04-14 LAB — CYTOLOGY CVX/VAG DOC THIN PREP: NORMAL

## 2019-04-18 ENCOUNTER — APPOINTMENT (OUTPATIENT)
Dept: OPHTHALMOLOGY | Facility: CLINIC | Age: 74
End: 2019-04-18

## 2019-05-07 ENCOUNTER — OUTPATIENT (OUTPATIENT)
Dept: OUTPATIENT SERVICES | Facility: HOSPITAL | Age: 74
LOS: 1 days | Discharge: HOME | End: 2019-05-07

## 2019-05-07 DIAGNOSIS — Z98.41 CATARACT EXTRACTION STATUS, RIGHT EYE: Chronic | ICD-10-CM

## 2019-05-07 DIAGNOSIS — C93.10 CHRONIC MYELOMONOCYTIC LEUKEMIA NOT HAVING ACHIEVED REMISSION: ICD-10-CM

## 2019-05-08 ENCOUNTER — RESULT REVIEW (OUTPATIENT)
Age: 74
End: 2019-05-08

## 2019-05-08 ENCOUNTER — OUTPATIENT (OUTPATIENT)
Dept: OUTPATIENT SERVICES | Facility: HOSPITAL | Age: 74
LOS: 1 days | End: 2019-05-08
Payer: MEDICARE

## 2019-05-08 ENCOUNTER — APPOINTMENT (OUTPATIENT)
Dept: HEMATOLOGY ONCOLOGY | Facility: CLINIC | Age: 74
End: 2019-05-08
Payer: MEDICARE

## 2019-05-08 VITALS
RESPIRATION RATE: 14 BRPM | BODY MASS INDEX: 28.63 KG/M2 | OXYGEN SATURATION: 99 % | SYSTOLIC BLOOD PRESSURE: 175 MMHG | WEIGHT: 156.53 LBS | DIASTOLIC BLOOD PRESSURE: 91 MMHG | HEART RATE: 72 BPM | TEMPERATURE: 98.3 F

## 2019-05-08 DIAGNOSIS — C92.10 CHRONIC MYELOID LEUKEMIA, BCR/ABL-POSITIVE, NOT HAVING ACHIEVED REMISSION: ICD-10-CM

## 2019-05-08 DIAGNOSIS — Z98.41 CATARACT EXTRACTION STATUS, RIGHT EYE: Chronic | ICD-10-CM

## 2019-05-08 LAB
ALBUMIN SERPL ELPH-MCNC: 4.5 G/DL
ALP BLD-CCNC: 60 U/L
ALT SERPL-CCNC: 84 U/L
ANION GAP SERPL CALC-SCNC: 14 MMOL/L
AST SERPL-CCNC: 61 U/L
BILIRUB SERPL-MCNC: 0.7 MG/DL
BUN SERPL-MCNC: 12 MG/DL
CALCIUM SERPL-MCNC: 9.1 MG/DL
CHLORIDE SERPL-SCNC: 102 MMOL/L
CO2 SERPL-SCNC: 28 MMOL/L
CREAT SERPL-MCNC: 0.73 MG/DL
GLUCOSE SERPL-MCNC: 90 MG/DL
HCT VFR BLD CALC: 40.7 % — SIGNIFICANT CHANGE UP (ref 34.5–45)
HGB BLD-MCNC: 13.8 G/DL — SIGNIFICANT CHANGE UP (ref 11.5–15.5)
MCHC RBC-ENTMCNC: 29.8 PG — SIGNIFICANT CHANGE UP (ref 27–34)
MCHC RBC-ENTMCNC: 33.8 G/DL — SIGNIFICANT CHANGE UP (ref 32–36)
MCV RBC AUTO: 88 FL — SIGNIFICANT CHANGE UP (ref 80–100)
PLATELET # BLD AUTO: 181 K/UL — SIGNIFICANT CHANGE UP (ref 150–400)
POTASSIUM SERPL-SCNC: 3.8 MMOL/L
PROT SERPL-MCNC: 7.1 G/DL
RBC # BLD: 4.63 M/UL — SIGNIFICANT CHANGE UP (ref 3.8–5.2)
RBC # FLD: 13.2 % — SIGNIFICANT CHANGE UP (ref 10.3–14.5)
SODIUM SERPL-SCNC: 144 MMOL/L
WBC # BLD: 4.9 K/UL — SIGNIFICANT CHANGE UP (ref 3.8–10.5)
WBC # FLD AUTO: 4.9 K/UL — SIGNIFICANT CHANGE UP (ref 3.8–10.5)

## 2019-05-08 PROCEDURE — 81206 BCR/ABL1 GENE MAJOR BP: CPT

## 2019-05-08 PROCEDURE — 99215 OFFICE O/P EST HI 40 MIN: CPT

## 2019-05-08 PROCEDURE — G0452: CPT | Mod: 26

## 2019-05-08 NOTE — ASSESSMENT
[Supportive] : Goals of care discussed with patient: Supportive [Palliative Care Plan] : Patient was apprised of incurable nature of disease.  Hospice and Palliative care options discussed. [FreeTextEntry1] : Davina Al is now 7 years from the diagnosis. She is taking nilotinib without difficulty for 5 years. No vomiting and no diarrhea; no skin rash, no detectable hepatic abnormality.\par She had comprehensive testing today.\par Medication compliance reviewed. She takes the medication each morning. She is awaiting medication arrival from [facility]; [facility] is requesting information on her financial status\par The patient is reminded to be consistent with her use of the antihypertensive medication.\par BCR ABL transcript quantitative; CMP ordered CBC is normal\par RTC 3 months

## 2019-05-08 NOTE — HISTORY OF PRESENT ILLNESS
[Disease:__________________________] : Disease: [unfilled] [Treatment Protocol] : Treatment Protocol [Cardiovascular] : Cardiovascular [Constitutional] : Constitutional [ENT] : ENT [Dermatologic] : Dermatologic [Endocrine] : Endocrine [Gastrointestinal] : Gastrointestinal [Genitourinary] : Genitourinary [Gynecologic] : Gynecologic [Infectious] : Infectious [Musculoskeletal] : Musculoskeletal [Neurologic] : Neurologic [Pain] : Pain [Pulmonary] : Pulmonary [Hematologic] : Hematologic [Date: ____________] : Patient's last distress assessment performed on [unfilled]. [0 - No Distress] : Distress Level: 0 [de-identified] : Patient with history of hypertension, CML diagnosed April 2012 .She was admitted to [facility] and was seen by Dr Bland of the transfusion service who performed several pheresis procedures to reduce a high platelet count (3,000,000). She was treatment with imatinib 400 mg PO daily and she was in compliance with treatment for three years. She discontinued treatment on her volition in 2015 and relapsed. \par On March 2015, she was reportedly feeling more tired with anorexia weight loss and presented at PMD with thrombocytosis (2.1 million platelet count). She was admitted with platelet count of 2,088K and underwent multiple cycles of plasmaphereses with improvement of thrombocytosis and begun on nilotinib and hydroxyurea. She stated at the time of admission that she stopped taking Gleevec as well as other antihypertensives as she felt she did not need them.\par  Her hospital course was complicated by herpes zoster with resolution on Valtrex as well as APC's which resolved with metoprolol. She was ultimately discharged on 4/17/2015.\par  She has tolerated the nilotinib without side effects. [de-identified] : This patient is seen in follow up for the leukemia; she is on therapy for six years. Here today alone. Her  is weaker form the prior brain tumor resection two years ago.\par She has no side effects from the medication. No dyspnea and no leg edema [FreeTextEntry1] : nilotinib 150  Tablets PO BID to be reduced to 2 tablet in AM, 1 tablet in PM

## 2019-05-08 NOTE — REVIEW OF SYSTEMS
[Negative] : Allergic/Immunologic [Night Sweats] : no night sweats [Fatigue] : no fatigue [Recent Change In Weight] : ~T no recent weight change [Chest Pain] : no chest pain [Palpitations] : no palpitations [Leg Claudication] : no intermittent leg claudication [Shortness Of Breath] : no shortness of breath [Lower Ext Edema] : no lower extremity edema [Cough] : no cough [Wheezing] : no wheezing [SOB on Exertion] : no shortness of breath during exertion

## 2019-05-13 LAB — BCR/ABL BY RT - PCR QUANTITATIVE: SIGNIFICANT CHANGE UP

## 2019-05-21 ENCOUNTER — APPOINTMENT (OUTPATIENT)
Dept: OPHTHALMOLOGY | Facility: CLINIC | Age: 74
End: 2019-05-21
Payer: MEDICARE

## 2019-05-21 DIAGNOSIS — H40.1130 PRIMARY OPEN-ANGLE GLAUCOMA, BILATERAL, STAGE UNSPECIFIED: ICD-10-CM

## 2019-05-21 DIAGNOSIS — H18.51 ENDOTHELIAL CORNEAL DYSTROPHY: ICD-10-CM

## 2019-05-21 PROCEDURE — 92133 CPTRZD OPH DX IMG PST SGM ON: CPT

## 2019-05-21 PROCEDURE — 92012 INTRM OPH EXAM EST PATIENT: CPT

## 2019-08-07 ENCOUNTER — OUTPATIENT (OUTPATIENT)
Dept: OUTPATIENT SERVICES | Facility: HOSPITAL | Age: 74
LOS: 1 days | Discharge: HOME | End: 2019-08-07

## 2019-08-07 DIAGNOSIS — Z98.41 CATARACT EXTRACTION STATUS, RIGHT EYE: Chronic | ICD-10-CM

## 2019-08-07 DIAGNOSIS — C93.10 CHRONIC MYELOMONOCYTIC LEUKEMIA NOT HAVING ACHIEVED REMISSION: ICD-10-CM

## 2019-08-09 ENCOUNTER — RESULT REVIEW (OUTPATIENT)
Age: 74
End: 2019-08-09

## 2019-08-09 ENCOUNTER — APPOINTMENT (OUTPATIENT)
Dept: HEMATOLOGY ONCOLOGY | Facility: CLINIC | Age: 74
End: 2019-08-09
Payer: MEDICARE

## 2019-08-09 VITALS
DIASTOLIC BLOOD PRESSURE: 99 MMHG | BODY MASS INDEX: 28.06 KG/M2 | OXYGEN SATURATION: 100 % | TEMPERATURE: 100 F | HEART RATE: 78 BPM | SYSTOLIC BLOOD PRESSURE: 182 MMHG | RESPIRATION RATE: 16 BRPM | WEIGHT: 153.44 LBS

## 2019-08-09 LAB
ALBUMIN SERPL ELPH-MCNC: 4.4 G/DL
ALP BLD-CCNC: 75 U/L
ALT SERPL-CCNC: 57 U/L
ANION GAP SERPL CALC-SCNC: 16 MMOL/L
AST SERPL-CCNC: 31 U/L
BASOPHILS # BLD AUTO: 0.1 K/UL — SIGNIFICANT CHANGE UP (ref 0–0.2)
BASOPHILS NFR BLD AUTO: 2 % — SIGNIFICANT CHANGE UP (ref 0–2)
BILIRUB SERPL-MCNC: 0.9 MG/DL
BUN SERPL-MCNC: 10 MG/DL
CALCIUM SERPL-MCNC: 9.1 MG/DL
CHLORIDE SERPL-SCNC: 103 MMOL/L
CO2 SERPL-SCNC: 27 MMOL/L
CREAT SERPL-MCNC: 0.65 MG/DL
EOSINOPHIL # BLD AUTO: 0.1 K/UL — SIGNIFICANT CHANGE UP (ref 0–0.5)
EOSINOPHIL NFR BLD AUTO: 1.3 % — SIGNIFICANT CHANGE UP (ref 0–6)
GLUCOSE SERPL-MCNC: 111 MG/DL
HCT VFR BLD CALC: 40.9 % — SIGNIFICANT CHANGE UP (ref 34.5–45)
HGB BLD-MCNC: 13.6 G/DL — SIGNIFICANT CHANGE UP (ref 11.5–15.5)
LYMPHOCYTES # BLD AUTO: 1.7 K/UL — SIGNIFICANT CHANGE UP (ref 1–3.3)
LYMPHOCYTES # BLD AUTO: 37.3 % — SIGNIFICANT CHANGE UP (ref 13–44)
MCHC RBC-ENTMCNC: 29.7 PG — SIGNIFICANT CHANGE UP (ref 27–34)
MCHC RBC-ENTMCNC: 33.3 G/DL — SIGNIFICANT CHANGE UP (ref 32–36)
MCV RBC AUTO: 89.3 FL — SIGNIFICANT CHANGE UP (ref 80–100)
MONOCYTES # BLD AUTO: 0.4 K/UL — SIGNIFICANT CHANGE UP (ref 0–0.9)
MONOCYTES NFR BLD AUTO: 9.3 % — SIGNIFICANT CHANGE UP (ref 2–14)
NEUTROPHILS # BLD AUTO: 2.2 K/UL — SIGNIFICANT CHANGE UP (ref 1.8–7.4)
NEUTROPHILS NFR BLD AUTO: 50.2 % — SIGNIFICANT CHANGE UP (ref 43–77)
PLATELET # BLD AUTO: 175 K/UL — SIGNIFICANT CHANGE UP (ref 150–400)
POTASSIUM SERPL-SCNC: 3 MMOL/L
PROT SERPL-MCNC: 7.2 G/DL
RBC # BLD: 4.58 M/UL — SIGNIFICANT CHANGE UP (ref 3.8–5.2)
RBC # FLD: 12.8 % — SIGNIFICANT CHANGE UP (ref 10.3–14.5)
SODIUM SERPL-SCNC: 146 MMOL/L
WBC # BLD: 4.5 K/UL — SIGNIFICANT CHANGE UP (ref 3.8–10.5)
WBC # FLD AUTO: 4.5 K/UL — SIGNIFICANT CHANGE UP (ref 3.8–10.5)

## 2019-08-09 PROCEDURE — 99214 OFFICE O/P EST MOD 30 MIN: CPT

## 2019-08-09 RX ORDER — TRIAMCINOLONE ACETONIDE 1 MG/G
0.1 OINTMENT TOPICAL
Qty: 1 | Refills: 1 | Status: DISCONTINUED | COMMUNITY
Start: 2019-02-21 | End: 2019-08-09

## 2019-08-09 NOTE — RESULTS/DATA
[FreeTextEntry1] : BCR ABL level acceptable 4 months ago; not detected\par CBC WBC 4.500 HGB 13.6  000\par copy given to the patient

## 2019-08-09 NOTE — ASSESSMENT
[Supportive] : Goals of care discussed with patient: Supportive [Palliative Care Plan] : not applicable at this time [FreeTextEntry1] : Young Al is a 74 year old lady with chronic myeloid leukemia diagnosed in 2012. She is now 78 months from diagnosis as she presented with hyperproliferative state and high platelets. management continues with TKI now nilotinib 150  2 tablets and one table in evening.The patient has had difficulty with her home pharmacy and she has asked to change to [facility]. We will provide assistance with patient forms if insurance is requested. \par She remains at risk for relapse and for side effects of medication. Good efficacy documented in May 2019 with no detectable abnormal chromone translocation\par RTC 4 months

## 2019-08-09 NOTE — HISTORY OF PRESENT ILLNESS
[Disease:__________________________] : Disease: [unfilled] [Treatment Protocol] : Treatment Protocol [Cardiovascular] : Cardiovascular [Constitutional] : Constitutional [ENT] : ENT [Dermatologic] : Dermatologic [Endocrine] : Endocrine [Gastrointestinal] : Gastrointestinal [Genitourinary] : Genitourinary [Gynecologic] : Gynecologic [Infectious] : Infectious [Musculoskeletal] : Musculoskeletal [Neurologic] : Neurologic [Pain] : Pain [Pulmonary] : Pulmonary [Hematologic] : Hematologic [Date: ____________] : Patient's last distress assessment performed on [unfilled]. [0 - No Distress] : Distress Level: 0 [de-identified] : Patient with history of hypertension, CML diagnosed April 2012 .She was admitted to [facility] and was seen by Dr Bland of the transfusion service who performed several pheresis procedures to reduce a high platelet count (3,000,000). She was treatment with imatinib 400 mg PO daily and she was in compliance with treatment for three years. She discontinued treatment on her volition in 2015 and relapsed. \par On March 2015, she was reportedly feeling more tired with anorexia weight loss and presented at PMD with thrombocytosis (2.1 million platelet count). She was admitted with platelet count of 2,088K and underwent multiple cycles of plasmaphereses with improvement of thrombocytosis and begun on nilotinib and hydroxyurea. She stated at the time of admission that she stopped taking Gleevec as well as other antihypertensives as she felt she did not need them.\par  Her hospital course was complicated by herpes zoster with resolution on Valtrex as well as APC's which resolved with metoprolol. She was ultimately discharged on 4/17/2015.\par  She has tolerated the nilotinib without side effects. [FreeTextEntry1] : nilotinib 150  Tablets PO 2 tablet in AM, 1 tablet in PM [de-identified] : Since her last visit, she has not had hospitalization. She has felt well.\par Prior prescription sent to [facility]; she feels that she is not receiving the medication from [facility] and she is asking for the prescription to be sent to [facility].\par No fever no chills and n night sweats.\par No nausea. No dyspnea or swelling of the legs. She forgot to take her antihypertension medication today

## 2019-08-09 NOTE — REASON FOR VISIT
[Follow-Up Visit] : a follow-up visit for [Chronic Leukemia] : chronic leukemia [Family Member] : family member [Other: _____] : [unfilled]

## 2019-08-20 ENCOUNTER — APPOINTMENT (OUTPATIENT)
Dept: INTERNAL MEDICINE | Facility: CLINIC | Age: 74
End: 2019-08-20
Payer: MEDICARE

## 2019-08-20 VITALS
OXYGEN SATURATION: 99 % | HEIGHT: 62 IN | HEART RATE: 77 BPM | WEIGHT: 155 LBS | DIASTOLIC BLOOD PRESSURE: 70 MMHG | SYSTOLIC BLOOD PRESSURE: 140 MMHG | BODY MASS INDEX: 28.52 KG/M2

## 2019-08-20 PROCEDURE — 99214 OFFICE O/P EST MOD 30 MIN: CPT

## 2019-08-22 NOTE — HISTORY OF PRESENT ILLNESS
[de-identified] : here for f/u of HTN, HLD.  Recent liver sono OK.  High 10 yr risk of ASCVD but consistently refuses Statin. \par Here with great grand-daughter who she frequently takes care of.\par Recently seen by Dr Orellana - she is taking her medication faithfully.

## 2019-08-22 NOTE — PHYSICAL EXAM
[Normal Rate] : normal rate  [Regular Rhythm] : with a regular rhythm [Normal S1, S2] : normal S1 and S2 [Pedal Pulses Present] : the pedal pulses are present [No Edema] : there was no peripheral edema [No Rash] : no rash [No Skin Lesions] : no skin lesions [Coordination Grossly Intact] : coordination grossly intact [No Focal Deficits] : no focal deficits [Deep Tendon Reflexes (DTR)] : deep tendon reflexes were 2+ and symmetric [Normal] : affect was normal and insight and judgment were intact

## 2019-08-22 NOTE — REVIEW OF SYSTEMS
[Negative] : Psychiatric [Suicidal] : not suicidal [Easy Bleeding] : no easy bleeding [Swollen Glands] : no swollen glands [Easy Bruising] : no easy bruising

## 2019-11-21 ENCOUNTER — APPOINTMENT (OUTPATIENT)
Dept: INTERNAL MEDICINE | Facility: CLINIC | Age: 74
End: 2019-11-21
Payer: MEDICARE

## 2019-11-21 VITALS
OXYGEN SATURATION: 98 % | HEART RATE: 90 BPM | SYSTOLIC BLOOD PRESSURE: 156 MMHG | WEIGHT: 153 LBS | BODY MASS INDEX: 28.16 KG/M2 | DIASTOLIC BLOOD PRESSURE: 90 MMHG | HEIGHT: 62 IN

## 2019-11-21 DIAGNOSIS — F34.1 DYSTHYMIC DISORDER: ICD-10-CM

## 2019-11-21 PROCEDURE — G0008: CPT

## 2019-11-21 PROCEDURE — 99214 OFFICE O/P EST MOD 30 MIN: CPT | Mod: 25

## 2019-11-21 PROCEDURE — 90662 IIV NO PRSV INCREASED AG IM: CPT

## 2019-11-25 LAB
ALBUMIN SERPL ELPH-MCNC: 4.3 G/DL
ALP BLD-CCNC: 53 U/L
ALT SERPL-CCNC: 14 U/L
ANION GAP SERPL CALC-SCNC: 15 MMOL/L
AST SERPL-CCNC: 16 U/L
BASOPHILS # BLD AUTO: 0.04 K/UL
BASOPHILS NFR BLD AUTO: 0.7 %
BILIRUB SERPL-MCNC: 0.7 MG/DL
BUN SERPL-MCNC: 10 MG/DL
CALCIUM SERPL-MCNC: 9.3 MG/DL
CHLORIDE SERPL-SCNC: 100 MMOL/L
CHOLEST SERPL-MCNC: 265 MG/DL
CHOLEST/HDLC SERPL: 3.4 RATIO
CO2 SERPL-SCNC: 29 MMOL/L
CREAT SERPL-MCNC: 0.72 MG/DL
EOSINOPHIL # BLD AUTO: 0.16 K/UL
EOSINOPHIL NFR BLD AUTO: 2.9 %
GLUCOSE SERPL-MCNC: 98 MG/DL
HCT VFR BLD CALC: 42.5 %
HDLC SERPL-MCNC: 78 MG/DL
HGB BLD-MCNC: 13.3 G/DL
IMM GRANULOCYTES NFR BLD AUTO: 0.2 %
LDLC SERPL CALC-MCNC: 168 MG/DL
LYMPHOCYTES # BLD AUTO: 2.08 K/UL
LYMPHOCYTES NFR BLD AUTO: 37.3 %
MAN DIFF?: NORMAL
MCHC RBC-ENTMCNC: 28.1 PG
MCHC RBC-ENTMCNC: 31.3 GM/DL
MCV RBC AUTO: 89.9 FL
MONOCYTES # BLD AUTO: 0.47 K/UL
MONOCYTES NFR BLD AUTO: 8.4 %
NEUTROPHILS # BLD AUTO: 2.82 K/UL
NEUTROPHILS NFR BLD AUTO: 50.5 %
PLATELET # BLD AUTO: 200 K/UL
POTASSIUM SERPL-SCNC: 3.2 MMOL/L
PROT SERPL-MCNC: 7 G/DL
RBC # BLD: 4.73 M/UL
RBC # FLD: 14.3 %
SODIUM SERPL-SCNC: 144 MMOL/L
TRIGL SERPL-MCNC: 95 MG/DL
TSH SERPL-ACNC: 1.06 UIU/ML
VIT B12 SERPL-MCNC: 816 PG/ML
WBC # FLD AUTO: 5.58 K/UL

## 2019-11-25 NOTE — HISTORY OF PRESENT ILLNESS
[Family Member] : family member [FreeTextEntry1] : cc: Sad mood. \par Also here for f/u of HTN, IGT, HLD.\par  [de-identified] : Here with granddaughter. Has been feeling down lately.  Anhedonia, loss of energy and motivation, doesn’t feewl like leaving the house.  having the beginning of the winter blues. Ms Al has experienced this in prior years, a worsening of mood when the cold weather begins and the days shorten. In the past, has often been able to 'tough it out' but this year she has come to the office to address it - would like to consider treatment options.

## 2019-11-25 NOTE — PHYSICAL EXAM
[No Lymphadenopathy] : no lymphadenopathy [Supple] : supple [Normal Rate] : normal rate  [Regular Rhythm] : with a regular rhythm [Normal S1, S2] : normal S1 and S2 [Pedal Pulses Present] : the pedal pulses are present [No Edema] : there was no peripheral edema [Normal] : no joint swelling and grossly normal strength and tone [No Rash] : no rash [No Skin Lesions] : no skin lesions [Coordination Grossly Intact] : coordination grossly intact [No Focal Deficits] : no focal deficits [Deep Tendon Reflexes (DTR)] : deep tendon reflexes were 2+ and symmetric [Alert and Oriented x3] : oriented to person, place, and time [Normal Insight/Judgement] : insight and judgment were intact [de-identified] : see PHQ9

## 2019-11-25 NOTE — REVIEW OF SYSTEMS
[Negative] : Neurological [Suicidal] : not suicidal [Easy Bleeding] : no easy bleeding [Easy Bruising] : no easy bruising [Swollen Glands] : no swollen glands [de-identified] : see hpi

## 2019-11-26 ENCOUNTER — APPOINTMENT (OUTPATIENT)
Dept: OPHTHALMOLOGY | Facility: CLINIC | Age: 74
End: 2019-11-26

## 2019-12-04 ENCOUNTER — OUTPATIENT (OUTPATIENT)
Dept: OUTPATIENT SERVICES | Facility: HOSPITAL | Age: 74
LOS: 1 days | Discharge: HOME | End: 2019-12-04

## 2019-12-04 DIAGNOSIS — C93.10 CHRONIC MYELOMONOCYTIC LEUKEMIA NOT HAVING ACHIEVED REMISSION: ICD-10-CM

## 2019-12-04 DIAGNOSIS — Z98.41 CATARACT EXTRACTION STATUS, RIGHT EYE: Chronic | ICD-10-CM

## 2019-12-10 ENCOUNTER — APPOINTMENT (OUTPATIENT)
Dept: INTERNAL MEDICINE | Facility: CLINIC | Age: 74
End: 2019-12-10

## 2019-12-11 ENCOUNTER — RESULT REVIEW (OUTPATIENT)
Age: 74
End: 2019-12-11

## 2019-12-11 ENCOUNTER — OUTPATIENT (OUTPATIENT)
Dept: OUTPATIENT SERVICES | Facility: HOSPITAL | Age: 74
LOS: 1 days | End: 2019-12-11
Payer: MEDICARE

## 2019-12-11 ENCOUNTER — APPOINTMENT (OUTPATIENT)
Dept: HEMATOLOGY ONCOLOGY | Facility: CLINIC | Age: 74
End: 2019-12-11
Payer: MEDICARE

## 2019-12-11 VITALS
RESPIRATION RATE: 14 BRPM | DIASTOLIC BLOOD PRESSURE: 95 MMHG | OXYGEN SATURATION: 100 % | HEART RATE: 64 BPM | BODY MASS INDEX: 28.1 KG/M2 | TEMPERATURE: 98.2 F | WEIGHT: 153.66 LBS | SYSTOLIC BLOOD PRESSURE: 154 MMHG

## 2019-12-11 DIAGNOSIS — Z98.41 CATARACT EXTRACTION STATUS, RIGHT EYE: Chronic | ICD-10-CM

## 2019-12-11 DIAGNOSIS — C93.10 CHRONIC MYELOMONOCYTIC LEUKEMIA NOT HAVING ACHIEVED REMISSION: ICD-10-CM

## 2019-12-11 LAB
ALBUMIN SERPL ELPH-MCNC: 4.4 G/DL
ALP BLD-CCNC: 56 U/L
ALT SERPL-CCNC: 17 U/L
ANION GAP SERPL CALC-SCNC: 14 MMOL/L
AST SERPL-CCNC: 19 U/L
BILIRUB SERPL-MCNC: 0.9 MG/DL
BUN SERPL-MCNC: 10 MG/DL
CALCIUM SERPL-MCNC: 9.4 MG/DL
CHLORIDE SERPL-SCNC: 100 MMOL/L
CO2 SERPL-SCNC: 30 MMOL/L
CREAT SERPL-MCNC: 0.86 MG/DL
GLUCOSE SERPL-MCNC: 93 MG/DL
HCT VFR BLD CALC: 43.2 % — SIGNIFICANT CHANGE UP (ref 34.5–45)
HGB BLD-MCNC: 14.1 G/DL — SIGNIFICANT CHANGE UP (ref 11.5–15.5)
MCHC RBC-ENTMCNC: 29.4 PG — SIGNIFICANT CHANGE UP (ref 27–34)
MCHC RBC-ENTMCNC: 32.7 G/DL — SIGNIFICANT CHANGE UP (ref 32–36)
MCV RBC AUTO: 90 FL — SIGNIFICANT CHANGE UP (ref 80–100)
PLATELET # BLD AUTO: 182 K/UL — SIGNIFICANT CHANGE UP (ref 150–400)
POTASSIUM SERPL-SCNC: 3.5 MMOL/L
PROT SERPL-MCNC: 7.1 G/DL
RBC # BLD: 4.81 M/UL — SIGNIFICANT CHANGE UP (ref 3.8–5.2)
RBC # FLD: 13.1 % — SIGNIFICANT CHANGE UP (ref 10.3–14.5)
SODIUM SERPL-SCNC: 144 MMOL/L
WBC # BLD: 5 K/UL — SIGNIFICANT CHANGE UP (ref 3.8–10.5)
WBC # FLD AUTO: 5 K/UL — SIGNIFICANT CHANGE UP (ref 3.8–10.5)

## 2019-12-11 PROCEDURE — 81206 BCR/ABL1 GENE MAJOR BP: CPT

## 2019-12-11 PROCEDURE — G0452: CPT | Mod: 26

## 2019-12-11 PROCEDURE — 99215 OFFICE O/P EST HI 40 MIN: CPT

## 2019-12-11 NOTE — ASSESSMENT
[Supportive] : Goals of care discussed with patient: Supportive [Palliative Care Plan] : not applicable at this time [FreeTextEntry1] : Young Al is a 74 year old lady with chronic myeloid leukemia diagnosed in 2012. She is approximately 82 months from diagnosis as she presented with hyperproliferative state and high platelets. management continues with TKI now nilotinib 150  2 tablets and one table in evening. She appeared well and her physical examination findings were unremarkable. She remains at risk for relapse and for side effects of medication. Good efficacy documented in May 2019 with no detectable abnormal chromone translocation. She was advised to use Tylenol on as needed basis for her arthritic related pain and to use Calcium/Vitamin D supplement as directed. Blood studies done today. Return to the office in 6 months. Seen in conjunction with Lucho CHARLTON

## 2019-12-11 NOTE — HISTORY OF PRESENT ILLNESS
[Disease:__________________________] : Disease: [unfilled] [Treatment Protocol] : Treatment Protocol [Cardiovascular] : Cardiovascular [Constitutional] : Constitutional [ENT] : ENT [Dermatologic] : Dermatologic [Endocrine] : Endocrine [Gastrointestinal] : Gastrointestinal [Genitourinary] : Genitourinary [Gynecologic] : Gynecologic [Infectious] : Infectious [Musculoskeletal] : Musculoskeletal [Neurologic] : Neurologic [Pain] : Pain [Pulmonary] : Pulmonary [Hematologic] : Hematologic [Date: ____________] : Patient's last distress assessment performed on [unfilled]. 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 She was treatment with imatinib 400 mg PO daily and she was in compliance with treatment for three years. She discontinued treatment on her volition in 2015 and relapsed. \par On March 2015, she was reportedly feeling more tired with anorexia weight loss and presented at PMD with thrombocytosis (2.1 million platelet count). She was admitted with platelet count of 2,088K and underwent multiple cycles of plasmaphereses with improvement of thrombocytosis and begun on nilotinib and hydroxyurea. She stated at the time of admission that she stopped taking Gleevec as well as other antihypertensives as she felt she did not need them.\par  Her hospital course was complicated by herpes zoster with resolution on Valtrex as well as APC's which resolved with metoprolol. She was ultimately discharged on 4/17/2015.\par  She has tolerated the nilotinib without side effects. [de-identified] : Patient presented to the office for routine follow-up visit. She noted some bilateral shoulder pain (worse than right) and right 4th digit pain for the past 1 month, believed to be arthritic in nature. She was recently recommended to incorporate more potassium in her diet and adjust her spironolactone to 1/2 tablet twice daily, based on recent lab work.  [FreeTextEntry1] : nilotinib 150 mg tablets PO 2 tablet in AM, 1 tablet in PM

## 2019-12-17 LAB — BCR/ABL BY RT - PCR QUANTITATIVE: SIGNIFICANT CHANGE UP

## 2019-12-24 ENCOUNTER — NON-APPOINTMENT (OUTPATIENT)
Age: 74
End: 2019-12-24

## 2019-12-24 ENCOUNTER — APPOINTMENT (OUTPATIENT)
Dept: OPHTHALMOLOGY | Facility: CLINIC | Age: 74
End: 2019-12-24
Payer: MEDICARE

## 2019-12-24 PROCEDURE — 92014 COMPRE OPH EXAM EST PT 1/>: CPT

## 2020-03-19 ENCOUNTER — APPOINTMENT (OUTPATIENT)
Dept: INTERNAL MEDICINE | Facility: CLINIC | Age: 75
End: 2020-03-19
Payer: MEDICARE

## 2020-03-19 ENCOUNTER — APPOINTMENT (OUTPATIENT)
Dept: INTERNAL MEDICINE | Facility: CLINIC | Age: 75
End: 2020-03-19

## 2020-03-19 DIAGNOSIS — M25.572 PAIN IN LEFT ANKLE AND JOINTS OF LEFT FOOT: ICD-10-CM

## 2020-03-19 DIAGNOSIS — M25.511 PAIN IN RIGHT SHOULDER: ICD-10-CM

## 2020-03-19 PROCEDURE — 99214 OFFICE O/P EST MOD 30 MIN: CPT

## 2020-03-20 NOTE — ASSESSMENT
[FreeTextEntry1] : R hand trigger trigger finger - painless. NTD at this time.  local inj in reserve if needed.

## 2020-03-20 NOTE — PLAN
[FreeTextEntry1] : F/u in office as soon as [facility] social isolation/distancing directive is lifted.

## 2020-03-20 NOTE — HISTORY OF PRESENT ILLNESS
[Home] : at home, [unfilled] , at the time of the visit. [Clinic: ([facility])___] : at the clinic in [unfilled] [Patient & Provider:  (Enter provider's Role) ____] : The patient, [unfilled] and [unfilled] ~ecp~  participated in the telehealth encounter [Other:____] : [unfilled] [de-identified] : Visit needed to f/u on HTN, hypokalemia, dysthymia.  A face to face visit is not possible due to the current State of Emergency so TeleHealth visit was initiated and is clinically appropriate to properly care for this patient.  The patient has given her consent to conduct a TeleHealth visit at the onset. \par \par Ms Al reports that she has been experiencing some intermittent right shoulder pain, left calf cramping, and left ankle discomfort which is worse when she walks on it for an extended length of time. The right arm has some reduced ROM and abduction to over 100 degrees causes some discomfort.  No trauma or falls. \par Also reports trigger finger on right hand, 4th digit. Has to use her otherr hand to release when it gets stuck in flexion.\par \par RE her chronic conditions, \par BP's have been running good - yesterdays /70.  she did not check it today. \par She is taking all her medication however needs renewal of metoprolol and spironolactone.  \par Her mood is good considering the difficult times with the COVID outbreak. \par Rep

## 2020-03-20 NOTE — REVIEW OF SYSTEMS
[Joint Pain] : joint pain [Muscle Pain] : muscle pain [Negative] : Psychiatric [FreeTextEntry9] : see hpi

## 2020-03-20 NOTE — HISTORY OF PRESENT ILLNESS
[Home] : at home, [unfilled] , at the time of the visit. [Clinic: ([facility])___] : at the clinic in [unfilled] [Patient & Provider:  (Enter provider's Role) ____] : The patient, [unfilled] and [unfilled] ~ecp~  participated in the telehealth encounter [Other:____] : [unfilled] [de-identified] : Visit needed to f/u on HTN, hypokalemia, dysthymia.  A face to face visit is not possible due to the current State of Emergency so TeleHealth visit was initiated and is clinically appropriate to properly care for this patient.  The patient has given her consent to conduct a TeleHealth visit at the onset. \par \par Ms Al reports that she has been experiencing some intermittent right shoulder pain, left calf cramping, and left ankle discomfort which is worse when she walks on it for an extended length of time. The right arm has some reduced ROM and abduction to over 100 degrees causes some discomfort.  No trauma or falls. \par Also reports trigger finger on right hand, 4th digit. Has to use her otherr hand to release when it gets stuck in flexion.\par \par RE her chronic conditions, \par BP's have been running good - yesterdays /70.  she did not check it today. \par She is taking all her medication however needs renewal of metoprolol and spironolactone.  \par Her mood is good considering the difficult times with the COVID outbreak. \par Rep

## 2020-03-20 NOTE — PHYSICAL EXAM
[No Acute Distress] : no acute distress [Well-Appearing] : well-appearing [No Respiratory Distress] : no respiratory distress  [No Accessory Muscle Use] : no accessory muscle use [No Rash] : no rash [No Focal Deficits] : no focal deficits [Normal Affect] : the affect was normal [Normal Insight/Judgement] : insight and judgment were intact [de-identified] : no swelling appreciated at left ankle or left calf.

## 2020-03-20 NOTE — PLAN
[FreeTextEntry1] : F/u in office as soon as [facility] social isolation/distancing directive is lifted.

## 2020-03-20 NOTE — PHYSICAL EXAM
[No Acute Distress] : no acute distress [Well-Appearing] : well-appearing [No Respiratory Distress] : no respiratory distress  [No Accessory Muscle Use] : no accessory muscle use [No Rash] : no rash [No Focal Deficits] : no focal deficits [Normal Affect] : the affect was normal [Normal Insight/Judgement] : insight and judgment were intact [de-identified] : no swelling appreciated at left ankle or left calf.

## 2020-06-11 ENCOUNTER — OUTPATIENT (OUTPATIENT)
Dept: OUTPATIENT SERVICES | Facility: HOSPITAL | Age: 75
LOS: 1 days | Discharge: HOME | End: 2020-06-11

## 2020-06-11 ENCOUNTER — OUTPATIENT (OUTPATIENT)
Dept: OUTPATIENT SERVICES | Facility: HOSPITAL | Age: 75
LOS: 1 days | End: 2020-06-11
Payer: MEDICARE

## 2020-06-11 DIAGNOSIS — Z98.41 CATARACT EXTRACTION STATUS, RIGHT EYE: Chronic | ICD-10-CM

## 2020-06-11 DIAGNOSIS — C93.10 CHRONIC MYELOMONOCYTIC LEUKEMIA NOT HAVING ACHIEVED REMISSION: ICD-10-CM

## 2020-06-12 ENCOUNTER — RESULT REVIEW (OUTPATIENT)
Age: 75
End: 2020-06-12

## 2020-06-12 ENCOUNTER — APPOINTMENT (OUTPATIENT)
Dept: HEMATOLOGY ONCOLOGY | Facility: CLINIC | Age: 75
End: 2020-06-12

## 2020-06-12 ENCOUNTER — APPOINTMENT (OUTPATIENT)
Dept: HEMATOLOGY ONCOLOGY | Facility: CLINIC | Age: 75
End: 2020-06-12
Payer: MEDICARE

## 2020-06-12 VITALS
RESPIRATION RATE: 16 BRPM | HEART RATE: 68 BPM | TEMPERATURE: 98.3 F | BODY MASS INDEX: 28.9 KG/M2 | OXYGEN SATURATION: 98 % | SYSTOLIC BLOOD PRESSURE: 181 MMHG | DIASTOLIC BLOOD PRESSURE: 104 MMHG | WEIGHT: 157.98 LBS

## 2020-06-12 LAB
ALBUMIN SERPL ELPH-MCNC: 4.6 G/DL
ALP BLD-CCNC: 57 U/L
ALT SERPL-CCNC: 22 U/L
ANION GAP SERPL CALC-SCNC: 13 MMOL/L
AST SERPL-CCNC: 23 U/L
BASOPHILS # BLD AUTO: 0.04 K/UL — SIGNIFICANT CHANGE UP (ref 0–0.2)
BASOPHILS NFR BLD AUTO: 0.7 % — SIGNIFICANT CHANGE UP (ref 0–2)
BILIRUB SERPL-MCNC: 0.7 MG/DL
BUN SERPL-MCNC: 16 MG/DL
CALCIUM SERPL-MCNC: 9.4 MG/DL
CHLORIDE SERPL-SCNC: 99 MMOL/L
CO2 SERPL-SCNC: 29 MMOL/L
CREAT SERPL-MCNC: 0.82 MG/DL
EOSINOPHIL # BLD AUTO: 0.13 K/UL — SIGNIFICANT CHANGE UP (ref 0–0.5)
EOSINOPHIL NFR BLD AUTO: 2.4 % — SIGNIFICANT CHANGE UP (ref 0–6)
GLUCOSE SERPL-MCNC: 91 MG/DL
HCT VFR BLD CALC: 41.1 % — SIGNIFICANT CHANGE UP (ref 34.5–45)
HGB BLD-MCNC: 13.2 G/DL — SIGNIFICANT CHANGE UP (ref 11.5–15.5)
IMM GRANULOCYTES NFR BLD AUTO: 0.4 % — SIGNIFICANT CHANGE UP (ref 0–1.5)
LYMPHOCYTES # BLD AUTO: 2.23 K/UL — SIGNIFICANT CHANGE UP (ref 1–3.3)
LYMPHOCYTES # BLD AUTO: 41.1 % — SIGNIFICANT CHANGE UP (ref 13–44)
MCHC RBC-ENTMCNC: 28.8 PG — SIGNIFICANT CHANGE UP (ref 27–34)
MCHC RBC-ENTMCNC: 32.1 GM/DL — SIGNIFICANT CHANGE UP (ref 32–36)
MCV RBC AUTO: 89.5 FL — SIGNIFICANT CHANGE UP (ref 80–100)
MONOCYTES # BLD AUTO: 0.57 K/UL — SIGNIFICANT CHANGE UP (ref 0–0.9)
MONOCYTES NFR BLD AUTO: 10.5 % — SIGNIFICANT CHANGE UP (ref 2–14)
NEUTROPHILS # BLD AUTO: 2.44 K/UL — SIGNIFICANT CHANGE UP (ref 1.8–7.4)
NEUTROPHILS NFR BLD AUTO: 44.9 % — SIGNIFICANT CHANGE UP (ref 43–77)
NRBC # BLD: 0 /100 WBCS — SIGNIFICANT CHANGE UP (ref 0–0)
PLATELET # BLD AUTO: 177 K/UL — SIGNIFICANT CHANGE UP (ref 150–400)
POTASSIUM SERPL-SCNC: 3.8 MMOL/L
PROT SERPL-MCNC: 7.2 G/DL
RBC # BLD: 4.59 M/UL — SIGNIFICANT CHANGE UP (ref 3.8–5.2)
RBC # FLD: 14.1 % — SIGNIFICANT CHANGE UP (ref 10.3–14.5)
SODIUM SERPL-SCNC: 141 MMOL/L
WBC # BLD: 5.43 K/UL — SIGNIFICANT CHANGE UP (ref 3.8–10.5)
WBC # FLD AUTO: 5.43 K/UL — SIGNIFICANT CHANGE UP (ref 3.8–10.5)

## 2020-06-12 PROCEDURE — 99215 OFFICE O/P EST HI 40 MIN: CPT

## 2020-06-12 PROCEDURE — 81207 BCR/ABL1 GENE MINOR BP: CPT

## 2020-06-12 PROCEDURE — 81206 BCR/ABL1 GENE MAJOR BP: CPT

## 2020-06-12 PROCEDURE — G0452: CPT | Mod: 26

## 2020-06-17 LAB — BCR/ABL BY RT - PCR QUANTITATIVE: SIGNIFICANT CHANGE UP

## 2020-06-22 DIAGNOSIS — C93.10 CHRONIC MYELOMONOCYTIC LEUKEMIA NOT HAVING ACHIEVED REMISSION: ICD-10-CM

## 2020-06-27 NOTE — HISTORY OF PRESENT ILLNESS
[Disease:__________________________] : Disease: [unfilled] [Treatment Protocol] : Treatment Protocol [Cardiovascular] : Cardiovascular [Constitutional] : Constitutional [Endocrine] : Endocrine [Dermatologic] : Dermatologic [ENT] : ENT [Genitourinary] : Genitourinary [Gastrointestinal] : Gastrointestinal [Gynecologic] : Gynecologic [Infectious] : Infectious [Musculoskeletal] : Musculoskeletal [Pain] : Pain [Neurologic] : Neurologic [Pulmonary] : Pulmonary [Hematologic] : Hematologic [de-identified] : 75 year old female with past medical history of hypertension presenting to [facility] for hematologic care. Patient was initially diagnosed with CML in April 2012. She was admitted to [facility] and was seen by Dr Bland of the transfusion service who performed several pheresis procedures to reduce a high platelet count (approximately 3,000,000). She was subsequently started on treatment with imatinib 400 mg PO daily and she was in compliance with treatment for three years. She discontinued treatment on her volition in early 2015. \par \par On March 2015, she was reportedly feeling more tired with anorexia weight loss and presented at PMD with thrombocytosis (2.1 million platelet count). She was admitted with platelet count of 2,088K and underwent multiple cycles of plasmaphereses with improvement of thrombocytosis and begun on nilotinib and hydroxyurea. She stated at the time of admission that she stopped taking Gleevec as well as other antihypertensives as she felt she did not need them. Her hospital course was complicated by herpes zoster with resolution on Valtrex as well as APC's which resolved with metoprolol. She was ultimately discharged on 4/17/2015. She has tolerated the nilotinib without side effects. [FreeTextEntry1] : nilotinib 150 mg tablets PO 2 tablet in AM, 1 tablet in PM [de-identified] : Patient presented to the office for routine follow-up visit. She reported intermittent neck pain radiating towards her left upper back/shoulder region; believed to be age related. She has remained indoors for the majority of her time over the last 3 months due to the current pandemic; denied recent hospitalizations, infections, changes in medications.

## 2020-06-27 NOTE — ASSESSMENT
[Supportive] : Goals of care discussed with patient: Supportive [Palliative Care Plan] : not applicable at this time [FreeTextEntry1] : Young Al is a 75 year old female diagnosed with chronic myeloid leukemia. She is approximately 8 years out from diagnosis as she presented with hyperproliferative state and high platelets. Management continues with TKI therapy,currently on nilotinib 150 mg (2 tablets every AM, 1 tablet every PM). She appeared well and her physical examination findings were unremarkable. Patient remains at risk for relapse and for side effects of medication. She was advised to use Tylenol on as needed basis for her arthritic related pain and to use Calcium/Vitamin D supplement as directed. Blood studies done today. Return to the office in 6 months.

## 2020-07-02 ENCOUNTER — NON-APPOINTMENT (OUTPATIENT)
Age: 75
End: 2020-07-02

## 2020-07-02 ENCOUNTER — APPOINTMENT (OUTPATIENT)
Dept: OPHTHALMOLOGY | Facility: CLINIC | Age: 75
End: 2020-07-02
Payer: MEDICARE

## 2020-07-02 PROCEDURE — 92133 CPTRZD OPH DX IMG PST SGM ON: CPT

## 2020-07-02 PROCEDURE — 92012 INTRM OPH EXAM EST PATIENT: CPT

## 2020-08-11 ENCOUNTER — APPOINTMENT (OUTPATIENT)
Dept: INTERNAL MEDICINE | Facility: CLINIC | Age: 75
End: 2020-08-11
Payer: MEDICARE

## 2020-08-11 DIAGNOSIS — R10.9 UNSPECIFIED ABDOMINAL PAIN: ICD-10-CM

## 2020-08-11 PROCEDURE — 99442: CPT

## 2020-08-16 NOTE — REVIEW OF SYSTEMS
[Fever] : no fever [Chills] : no chills [Dysuria] : no dysuria [Skin Rash] : no skin rash [Negative] : Respiratory [FreeTextEntry7] : see HPI

## 2020-08-16 NOTE — HISTORY OF PRESENT ILLNESS
[Home] : at home, [unfilled] , at the time of the visit. [Medical Office: ([facility])___] : at the medical office located in  [Verbal consent obtained from patient] : the patient, [unfilled] [de-identified] : Visit scheduled by patient to f/u address abdominal pain. An in-person visit is not currently feasible due to the stay-at-home advisory across [facility] due to COVID-19, so Telephonic visit was initiated and is appropriate to care for this patient. \par \par c/o pain on the right side of her abdomen for 5 days. Pain is dull and intermittent (not currently present), not upper or lower, more in the middle on the right.  When it does come, is ~ 5/10 in intensity. She is not having any nausea, vomiting, or diarrhea. no fever or chills. No dysuria, BM's are normal. Not currently able to come in for evaluation.

## 2020-08-16 NOTE — ASSESSMENT
[FreeTextEntry1] : Unclear etiology - sending for us of Abd.  If unclear etiology and persists, will need to come in for evaluation. \par ER or local [facility] for acute pain - unable to locate [facility] in the [facility] where she lives.  if she needs, she says there is a neighborhood [facility] which she says is very close to her home.

## 2020-08-27 ENCOUNTER — APPOINTMENT (OUTPATIENT)
Dept: ULTRASOUND IMAGING | Facility: IMAGING CENTER | Age: 75
End: 2020-08-27
Payer: MEDICARE

## 2020-08-27 ENCOUNTER — OUTPATIENT (OUTPATIENT)
Dept: OUTPATIENT SERVICES | Facility: HOSPITAL | Age: 75
LOS: 1 days | End: 2020-08-27
Payer: MEDICARE

## 2020-08-27 DIAGNOSIS — Z98.41 CATARACT EXTRACTION STATUS, RIGHT EYE: Chronic | ICD-10-CM

## 2020-08-27 DIAGNOSIS — R10.9 UNSPECIFIED ABDOMINAL PAIN: ICD-10-CM

## 2020-08-27 PROCEDURE — 76700 US EXAM ABDOM COMPLETE: CPT | Mod: 26

## 2020-08-27 PROCEDURE — 76700 US EXAM ABDOM COMPLETE: CPT

## 2020-09-16 ENCOUNTER — APPOINTMENT (OUTPATIENT)
Dept: INTERNAL MEDICINE | Facility: CLINIC | Age: 75
End: 2020-09-16
Payer: MEDICARE

## 2020-09-16 ENCOUNTER — NON-APPOINTMENT (OUTPATIENT)
Age: 75
End: 2020-09-16

## 2020-09-16 VITALS
WEIGHT: 160 LBS | HEART RATE: 72 BPM | OXYGEN SATURATION: 98 % | SYSTOLIC BLOOD PRESSURE: 156 MMHG | DIASTOLIC BLOOD PRESSURE: 100 MMHG | BODY MASS INDEX: 29.26 KG/M2

## 2020-09-16 DIAGNOSIS — Z23 ENCOUNTER FOR IMMUNIZATION: ICD-10-CM

## 2020-09-16 DIAGNOSIS — R10.9 UNSPECIFIED ABDOMINAL PAIN: ICD-10-CM

## 2020-09-16 PROCEDURE — 90662 IIV NO PRSV INCREASED AG IM: CPT

## 2020-09-16 PROCEDURE — G0008: CPT

## 2020-09-16 PROCEDURE — 93000 ELECTROCARDIOGRAM COMPLETE: CPT

## 2020-09-16 PROCEDURE — 99214 OFFICE O/P EST MOD 30 MIN: CPT | Mod: 25

## 2020-09-17 PROBLEM — R10.9 INTERMITTENT ABDOMINAL PAIN: Status: ACTIVE | Noted: 2020-09-17

## 2020-09-17 LAB
ALBUMIN SERPL ELPH-MCNC: 4.5 G/DL
ALP BLD-CCNC: 58 U/L
ALT SERPL-CCNC: 18 U/L
ANION GAP SERPL CALC-SCNC: 13 MMOL/L
AST SERPL-CCNC: 17 U/L
BASOPHILS # BLD AUTO: 0.05 K/UL
BASOPHILS NFR BLD AUTO: 0.9 %
BILIRUB SERPL-MCNC: 0.4 MG/DL
BUN SERPL-MCNC: 10 MG/DL
CALCIUM SERPL-MCNC: 9.7 MG/DL
CHLORIDE SERPL-SCNC: 102 MMOL/L
CO2 SERPL-SCNC: 28 MMOL/L
CREAT SERPL-MCNC: 0.77 MG/DL
EOSINOPHIL # BLD AUTO: 0.14 K/UL
EOSINOPHIL NFR BLD AUTO: 2.4 %
ESTIMATED AVERAGE GLUCOSE: 123 MG/DL
GLUCOSE SERPL-MCNC: 90 MG/DL
HBA1C MFR BLD HPLC: 5.9 %
HCT VFR BLD CALC: 42.1 %
HGB BLD-MCNC: 13.5 G/DL
IMM GRANULOCYTES NFR BLD AUTO: 0.2 %
LYMPHOCYTES # BLD AUTO: 2.41 K/UL
LYMPHOCYTES NFR BLD AUTO: 41.3 %
MAN DIFF?: NORMAL
MCHC RBC-ENTMCNC: 28.2 PG
MCHC RBC-ENTMCNC: 32.1 GM/DL
MCV RBC AUTO: 88.1 FL
MONOCYTES # BLD AUTO: 0.43 K/UL
MONOCYTES NFR BLD AUTO: 7.4 %
NEUTROPHILS # BLD AUTO: 2.8 K/UL
NEUTROPHILS NFR BLD AUTO: 47.8 %
PLATELET # BLD AUTO: 200 K/UL
POTASSIUM SERPL-SCNC: 3.4 MMOL/L
PROT SERPL-MCNC: 7 G/DL
RBC # BLD: 4.78 M/UL
RBC # FLD: 14.3 %
SODIUM SERPL-SCNC: 143 MMOL/L
WBC # FLD AUTO: 5.84 K/UL

## 2020-09-17 NOTE — ASSESSMENT
[FreeTextEntry1] : RUQ pain resolved however US showed slightly coarsened texture compared to prior - checking LFTS\par Also now reports intermittent RLQ pain - not currently present - will send for sono of abd and pelvis in about 6 weeks to recheck liver and to eval RLQ.  Can go sooner if any recurrent pain occurs. \par recheck US RE changes in liver along with checking LFT's\par Overdue for mammo and DEXA - rx given\par BP again elevated today - Increase amlodipine to 10 mg QD - call for any side effects and increased dose\par recheck BP at upcoming CPE\par

## 2020-09-17 NOTE — PHYSICAL EXAM
[No Acute Distress] : no acute distress [Well-Appearing] : well-appearing [Normal Sclera/Conjunctiva] : normal sclera/conjunctiva [EOMI] : extraocular movements intact [Normal Outer Ear/Nose] : the outer ears and nose were normal in appearance [No Lymphadenopathy] : no lymphadenopathy [No Respiratory Distress] : no respiratory distress  [Clear to Auscultation] : lungs were clear to auscultation bilaterally [Normal Rate] : normal rate  [Regular Rhythm] : with a regular rhythm [Normal S1, S2] : normal S1 and S2 [No Edema] : there was no peripheral edema [Soft] : abdomen soft [Non Tender] : non-tender [Normal Bowel Sounds] : normal bowel sounds [No CVA Tenderness] : no CVA  tenderness [No Spinal Tenderness] : no spinal tenderness [Grossly Normal Strength/Tone] : grossly normal strength/tone [No Rash] : no rash [Coordination Grossly Intact] : coordination grossly intact [No Focal Deficits] : no focal deficits [Normal Affect] : the affect was normal [de-identified] : mildly distended

## 2020-09-17 NOTE — REVIEW OF SYSTEMS
[Incontinence] : incontinence [Negative] : Heme/Lymph [Fever] : no fever [Chills] : no chills [Nausea] : no nausea [Diarrhea] : diarrhea [Heartburn] : no heartburn [Melena] : no melena [Dysuria] : no dysuria [FreeTextEntry7] : see hpi

## 2020-09-17 NOTE — HISTORY OF PRESENT ILLNESS
[FreeTextEntry1] : here for f/u after abdominal sonogram [de-identified] : Initially, one month ago - c/o pain on the right side of her abdomen for 5 days. Pain was dull and intermittent, not upper or lower, more in the middle on the right.  When it did come, was ~ 5/10 in intensity. She did not having any nausea, vomiting, or diarrhea. no fever or chills. No dysuria, BM's were normal.  \par \par Back here today after doing abdominal sonogram. She reports the abdominal pain resolved several days ago and has not recurred. She never went to an urgent care center as it never really got that intense. Also, reports that she gets occasional intermittent RLQ pain  - in the 'area of her ovaries.'  Also not currently present.

## 2020-11-16 ENCOUNTER — APPOINTMENT (OUTPATIENT)
Dept: MAMMOGRAPHY | Facility: IMAGING CENTER | Age: 75
End: 2020-11-16

## 2020-11-16 ENCOUNTER — APPOINTMENT (OUTPATIENT)
Dept: ULTRASOUND IMAGING | Facility: IMAGING CENTER | Age: 75
End: 2020-11-16

## 2020-11-16 ENCOUNTER — NON-APPOINTMENT (OUTPATIENT)
Age: 75
End: 2020-11-16

## 2020-11-16 ENCOUNTER — APPOINTMENT (OUTPATIENT)
Dept: RADIOLOGY | Facility: IMAGING CENTER | Age: 75
End: 2020-11-16

## 2020-11-17 ENCOUNTER — APPOINTMENT (OUTPATIENT)
Dept: INTERNAL MEDICINE | Facility: CLINIC | Age: 75
End: 2020-11-17
Payer: MEDICARE

## 2020-11-17 VITALS
HEIGHT: 62 IN | OXYGEN SATURATION: 99 % | HEART RATE: 70 BPM | SYSTOLIC BLOOD PRESSURE: 170 MMHG | BODY MASS INDEX: 30.36 KG/M2 | DIASTOLIC BLOOD PRESSURE: 80 MMHG | WEIGHT: 165 LBS

## 2020-11-17 VITALS — DIASTOLIC BLOOD PRESSURE: 74 MMHG | SYSTOLIC BLOOD PRESSURE: 150 MMHG

## 2020-11-17 DIAGNOSIS — Z71.89 OTHER SPECIFIED COUNSELING: ICD-10-CM

## 2020-11-17 PROCEDURE — G0442 ANNUAL ALCOHOL SCREEN 15 MIN: CPT

## 2020-11-17 PROCEDURE — 99497 ADVNCD CARE PLAN 30 MIN: CPT | Mod: 33

## 2020-11-17 PROCEDURE — G0444 DEPRESSION SCREEN ANNUAL: CPT

## 2020-11-17 PROCEDURE — G0439: CPT

## 2020-11-27 PROBLEM — Z71.89 ADVANCE CARE PLANNING: Status: ACTIVE | Noted: 2020-11-27

## 2020-11-27 NOTE — PHYSICAL EXAM
[No Acute Distress] : no acute distress [Well-Appearing] : well-appearing [Normal Sclera/Conjunctiva] : normal sclera/conjunctiva [PERRL] : pupils equal round and reactive to light [EOMI] : extraocular movements intact [Normal Outer Ear/Nose] : the outer ears and nose were normal in appearance [No Lymphadenopathy] : no lymphadenopathy [Supple] : supple [No Respiratory Distress] : no respiratory distress  [Clear to Auscultation] : lungs were clear to auscultation bilaterally [Normal Rate] : normal rate  [Regular Rhythm] : with a regular rhythm [Normal S1, S2] : normal S1 and S2 [Pedal Pulses Present] : the pedal pulses are present [No Edema] : there was no peripheral edema [Normal Appearance] : normal in appearance [No Masses] : no palpable masses [Soft] : abdomen soft [Non Tender] : non-tender [Normal Bowel Sounds] : normal bowel sounds [Normal Axillary Nodes] : no axillary lymphadenopathy [Normal Posterior Cervical Nodes] : no posterior cervical lymphadenopathy [Normal Anterior Cervical Nodes] : no anterior cervical lymphadenopathy [No CVA Tenderness] : no CVA  tenderness [No Spinal Tenderness] : no spinal tenderness [No Joint Swelling] : no joint swelling [Grossly Normal Strength/Tone] : grossly normal strength/tone [Normal] : normal texture and mobility [Coordination Grossly Intact] : coordination grossly intact [No Focal Deficits] : no focal deficits [Normal Affect] : the affect was normal [Deep Tendon Reflexes (DTR)] : deep tendon reflexes were 2+ and symmetric [Normal Insight/Judgement] : insight and judgment were intact [de-identified] : no skin changes [de-identified] : ~ 5 x 7 cm erythematous patch -  morphia?

## 2020-11-27 NOTE — HEALTH RISK ASSESSMENT
[2 - 3 times a week (3 pts)] : 2 - 3  times a week (3 points) [1 or 2 (0 pts)] : 1 or 2 (0 points) [Never (0 pts)] : Never (0 points) [No falls in past year] : Patient reported no falls in the past year [No Retinopathy] : No retinopathy [Patient reported mammogram was normal] : Patient reported mammogram was normal [Patient declined colonoscopy] : Patient declined colonoscopy [None] : None [With Family] : lives with family [Retired] : retired [] :  [Feels Safe at Home] : Feels safe at home [Fully functional (bathing, dressing, toileting, transferring, walking, feeding)] : Fully functional (bathing, dressing, toileting, transferring, walking, feeding) [Fully functional (using the telephone, shopping, preparing meals, housekeeping, doing laundry, using] : Fully functional and needs no help or supervision to perform IADLs (using the telephone, shopping, preparing meals, housekeeping, doing laundry, using transportation, managing medications and managing finances) [Smoke Detector] : smoke detector [With Patient/Caregiver] : With Patient/Caregiver [Designated Healthcare Proxy] : Designated healthcare proxy [Name: ___] : Health Care Proxy's Name: [unfilled]  [Relationship: ___] : Relationship: [unfilled] [0] : 2) Feeling down, depressed, or hopeless: Not at all (0) [HIV test declined] : HIV test declined [Hepatitis C test declined] : Hepatitis C test declined [I will adhere to the patient's wishes as expressed in the advance directive except as noted below.] : I will adhere to the patient's wishes as expressed in the advance directive except as noted below [] : No [de-identified] : heme/onc,  [de-identified] : walking [de-identified] : still juicing -  [[id number]] : 0 [EyeExamDate] : 05/20 [Change in mental status noted] : No change in mental status noted [Sexually Active] : not sexually active [Reports changes in hearing] : Reports no changes in hearing [Reports changes in vision] : Reports no changes in vision [MammogramDate] : 08/18 [BoneDensityComments] : ordered [AdvancecareDate] : 11/20

## 2020-11-27 NOTE — DISCUSSION/SUMMARY
[Subsequent Annual Wellness] : Subsequent Annual Wellness Visit [Preventive Exam & Counseling Completed] : with preventive exam as well as age and risk appropriate counseling completed [Healthy Diet] : counseling was given on maintaining a healthy diet [Healthy Weight] : counseling was given on maintaining a healthy weight [Blood Glucose] : due for blood glucose [Screening Mammogram] : due for a screening mammogram [Calcium and Vitamin D Intake] : counseling was given on obtaining adequate amounts of calcium and vitamin D on a daily basis [Regular Weightbearing Exercise] : counseling was given on the importance of regular weightbearing exercise [DXA Axial] : due for DXA axial skeleton [Screening Not Indicated] : screening not indicated [Screening Current] : screening is current [Patient Declines] : the patient declines screening [Influenza UTD This Year] : influenza vaccine is up to date this year [Influenza Recommended Annually] : influenza vaccination is recommended annually [Lifetime Vaccine Completed] : the lifetime pneumococcal vaccine has been completed [Paperwork & Instructions Given] : paperwork and instructions were given to the patient [Encouraged to F/U to Discuss Questions/Decisions] : ~he/she~ was encouraged to follow-up with me to discuss ~his/her~ questions and/or decisions [Follow-Up in ___] : follow-up visit needed in [unfilled]

## 2020-11-27 NOTE — ASSESSMENT
[FreeTextEntry1] : Annual alcohol screen completed this visit. Currently rarely drinks ETOH.  Healthy drinking guidelines shared with patient (Female and male overage 65 no more than 3 SSD on any day, no more than 7 per week). Positive reinforcement provided given patient currently within healthy guidelines. \par \par Annual depression screen completed this visit and reviewed.  Screening not suggestive of diagnosis of MDD. \par \par Discussed advanced directives, Health Care Proxy, and goals of care during visit.

## 2020-11-27 NOTE — HISTORY OF PRESENT ILLNESS
[de-identified] : did not yet go for mammo, DEXA, US since last visit - lost rx's\par Derm - has erythematous patch she would like evaluated.

## 2020-12-04 ENCOUNTER — OUTPATIENT (OUTPATIENT)
Dept: OUTPATIENT SERVICES | Facility: HOSPITAL | Age: 75
LOS: 1 days | Discharge: HOME | End: 2020-12-04

## 2020-12-04 DIAGNOSIS — C93.10 CHRONIC MYELOMONOCYTIC LEUKEMIA NOT HAVING ACHIEVED REMISSION: ICD-10-CM

## 2020-12-04 DIAGNOSIS — Z98.41 CATARACT EXTRACTION STATUS, RIGHT EYE: Chronic | ICD-10-CM

## 2020-12-09 ENCOUNTER — RESULT REVIEW (OUTPATIENT)
Age: 75
End: 2020-12-09

## 2020-12-09 ENCOUNTER — APPOINTMENT (OUTPATIENT)
Dept: HEMATOLOGY ONCOLOGY | Facility: CLINIC | Age: 75
End: 2020-12-09
Payer: MEDICARE

## 2020-12-09 VITALS
HEART RATE: 77 BPM | OXYGEN SATURATION: 100 % | WEIGHT: 165.35 LBS | BODY MASS INDEX: 30.43 KG/M2 | RESPIRATION RATE: 17 BRPM | SYSTOLIC BLOOD PRESSURE: 169 MMHG | HEIGHT: 61.97 IN | TEMPERATURE: 97.6 F | DIASTOLIC BLOOD PRESSURE: 94 MMHG

## 2020-12-09 DIAGNOSIS — M85.80 OTHER SPECIFIED DISORDERS OF BONE DENSITY AND STRUCTURE, UNSPECIFIED SITE: ICD-10-CM

## 2020-12-09 DIAGNOSIS — R06.02 SHORTNESS OF BREATH: ICD-10-CM

## 2020-12-09 LAB
ALBUMIN SERPL ELPH-MCNC: 4.4 G/DL
ALP BLD-CCNC: 72 U/L
ALT SERPL-CCNC: 77 U/L
ANION GAP SERPL CALC-SCNC: 11 MMOL/L
AST SERPL-CCNC: 55 U/L
BASOPHILS # BLD AUTO: 0.04 K/UL — SIGNIFICANT CHANGE UP (ref 0–0.2)
BASOPHILS NFR BLD AUTO: 0.7 % — SIGNIFICANT CHANGE UP (ref 0–2)
BILIRUB SERPL-MCNC: 0.9 MG/DL
BUN SERPL-MCNC: 12 MG/DL
CALCIUM SERPL-MCNC: 9.5 MG/DL
CHLORIDE SERPL-SCNC: 100 MMOL/L
CO2 SERPL-SCNC: 29 MMOL/L
CREAT SERPL-MCNC: 0.95 MG/DL
EOSINOPHIL # BLD AUTO: 0.2 K/UL — SIGNIFICANT CHANGE UP (ref 0–0.5)
EOSINOPHIL NFR BLD AUTO: 3.4 % — SIGNIFICANT CHANGE UP (ref 0–6)
GLUCOSE SERPL-MCNC: 110 MG/DL
HCT VFR BLD CALC: 41.6 % — SIGNIFICANT CHANGE UP (ref 34.5–45)
HGB BLD-MCNC: 13.4 G/DL — SIGNIFICANT CHANGE UP (ref 11.5–15.5)
IMM GRANULOCYTES NFR BLD AUTO: 0.3 % — SIGNIFICANT CHANGE UP (ref 0–1.5)
LYMPHOCYTES # BLD AUTO: 1.91 K/UL — SIGNIFICANT CHANGE UP (ref 1–3.3)
LYMPHOCYTES # BLD AUTO: 32.6 % — SIGNIFICANT CHANGE UP (ref 13–44)
MCHC RBC-ENTMCNC: 28.7 PG — SIGNIFICANT CHANGE UP (ref 27–34)
MCHC RBC-ENTMCNC: 32.2 G/DL — SIGNIFICANT CHANGE UP (ref 32–36)
MCV RBC AUTO: 89.1 FL — SIGNIFICANT CHANGE UP (ref 80–100)
MONOCYTES # BLD AUTO: 0.55 K/UL — SIGNIFICANT CHANGE UP (ref 0–0.9)
MONOCYTES NFR BLD AUTO: 9.4 % — SIGNIFICANT CHANGE UP (ref 2–14)
NEUTROPHILS # BLD AUTO: 3.13 K/UL — SIGNIFICANT CHANGE UP (ref 1.8–7.4)
NEUTROPHILS NFR BLD AUTO: 53.6 % — SIGNIFICANT CHANGE UP (ref 43–77)
NRBC # BLD: 0 /100 WBCS — SIGNIFICANT CHANGE UP (ref 0–0)
PLATELET # BLD AUTO: 179 K/UL — SIGNIFICANT CHANGE UP (ref 150–400)
POTASSIUM SERPL-SCNC: 3.4 MMOL/L
PROT SERPL-MCNC: 7 G/DL
RBC # BLD: 4.67 M/UL — SIGNIFICANT CHANGE UP (ref 3.8–5.2)
RBC # FLD: 14 % — SIGNIFICANT CHANGE UP (ref 10.3–14.5)
SODIUM SERPL-SCNC: 140 MMOL/L
WBC # BLD: 5.85 K/UL — SIGNIFICANT CHANGE UP (ref 3.8–10.5)
WBC # FLD AUTO: 5.85 K/UL — SIGNIFICANT CHANGE UP (ref 3.8–10.5)

## 2020-12-09 PROCEDURE — 99072 ADDL SUPL MATRL&STAF TM PHE: CPT

## 2020-12-09 PROCEDURE — 99215 OFFICE O/P EST HI 40 MIN: CPT

## 2020-12-09 NOTE — HISTORY OF PRESENT ILLNESS
[Disease:__________________________] : Disease: [unfilled] [Treatment Protocol] : Treatment Protocol [de-identified] : 75 year old female with past medical history of hypertension presenting to [facility] for hematologic care. Patient was initially diagnosed with CML in April 2012. She was admitted to [facility] and was seen by Dr Bland of the transfusion service who performed several pheresis procedures to reduce a high platelet count (approximately 3,000,000). She was subsequently started on treatment with imatinib 400 mg PO daily and she was in compliance with treatment for three years. She discontinued treatment on her volition in early 2015. \par \par On March 2015, she was reportedly feeling more tired with anorexia weight loss and presented at PMD with thrombocytosis (2.1 million platelet count). She was admitted with platelet count of 2,088K and underwent multiple cycles of plasmaphereses with improvement of thrombocytosis and begun on nilotinib and hydroxyurea. She stated at the time of admission that she stopped taking Gleevec as well as other antihypertensives as she felt she did not need them. Her hospital course was complicated by herpes zoster with resolution on Valtrex as well as APC's which resolved with metoprolol. She was ultimately discharged on 4/17/2015. She has tolerated the nilotinib without side effects. [FreeTextEntry1] : nilotinib 150 mg tablets PO 2 tablet in AM, 1 tablet in PM [de-identified] : Patient presented to the office for routine follow-up visit. She continues to take her nilotinib as directed. Her blood pressure medication was recently increased to Amlodipine 10 mg daily. She also admitted to persistent shortness of breath but denied fever/chills, hemoptysis, recent trauma/injury, weight loss at this time. Patient has remained indoors for the majority of her time over the last 6 months due to the current pandemic; denied recent hospitalizations, infections.  [Date: ____________] : Patient's last distress assessment performed on [unfilled]. [0 - No Distress] : Distress Level: 0

## 2020-12-09 NOTE — ASSESSMENT
[Supportive] : Goals of care discussed with patient: Supportive [Palliative Care Plan] : not applicable at this time [FreeTextEntry1] : Young Al is a 75 year old female diagnosed with chronic myeloid leukemia. She is approximately 8 years out from diagnosis as she presented with hyperproliferative state and high platelets. Management continues with TKI therapy,currently on nilotinib 150 mg (2 tablets every AM, 1 tablet every PM). She appeared well and her physical examination findings were unremarkable. Patient remains at risk for relapse and for side effects of medication. Patient's BCR-ABL has remained at an acceptable range (not detected) and will remain on her current dosage as directed. Chest x-ray requested today due to her reported shortness of breath (rule out infection versus effusion). Blood studies done today. Return to the office in 6 months. Seen in conjunction with Lucho CHARLTON

## 2020-12-09 NOTE — REVIEW OF SYSTEMS
[Negative] : Allergic/Immunologic [Fatigue] : fatigue [Shortness Of Breath] : shortness of breath [Wheezing] : wheezing [SOB on Exertion] : shortness of breath during exertion [Joint Pain] : joint pain [Joint Stiffness] : joint stiffness [Fever] : no fever [Chills] : no chills [Night Sweats] : no night sweats [Recent Change In Weight] : ~T no recent weight change [Eye Pain] : no eye pain [Red Eyes] : eyes not red [Dry Eyes] : no dryness of the eyes [Vision Problems] : no vision problems [Dysphagia] : no dysphagia [Loss of Hearing] : no loss of hearing [Nosebleeds] : no nosebleeds [Hoarseness] : no hoarseness [Odynophagia] : no odynophagia [Mucosal Pain] : no mucosal pain [Chest Pain] : no chest pain [Palpitations] : no palpitations [Leg Claudication] : no intermittent leg claudication [Lower Ext Edema] : no lower extremity edema [Cough] : no cough [Abdominal Pain] : no abdominal pain [Vomiting] : no vomiting [Constipation] : no constipation [Diarrhea] : no diarrhea [Dysuria] : no dysuria [Incontinence] : no incontinence [Vaginal Discharge] : no vaginal discharge [Dysmenorrhea/Abn Vaginal Bleeding] : no dysmenorrhea/abnormal vaginal bleeding [Muscle Pain] : no muscle pain [Skin Rash] : no skin rash [Skin Wound] : no skin wound [Confused] : no confusion [Dizziness] : no dizziness [Fainting] : no fainting [Difficulty Walking] : no difficulty walking [Suicidal] : not suicidal [Insomnia] : no insomnia [Anxiety] : no anxiety [Depression] : no depression [Proptosis] : no proptosis [Hot Flashes] : no hot flashes [Muscle Weakness] : no muscle weakness [Deepening Of The Voice] : no deepening of the voice [Easy Bleeding] : no tendency for easy bleeding [Easy Bruising] : no tendency for easy bruising [Swollen Glands] : no swollen glands

## 2020-12-14 LAB — T(9;22)(ABL1,BCR)/CONTROL BLD/T: NORMAL

## 2020-12-21 ENCOUNTER — RX RENEWAL (OUTPATIENT)
Age: 75
End: 2020-12-21

## 2020-12-24 ENCOUNTER — NON-APPOINTMENT (OUTPATIENT)
Age: 75
End: 2020-12-24

## 2020-12-24 ENCOUNTER — APPOINTMENT (OUTPATIENT)
Dept: OPHTHALMOLOGY | Facility: CLINIC | Age: 75
End: 2020-12-24
Payer: MEDICARE

## 2020-12-24 PROCEDURE — 92012 INTRM OPH EXAM EST PATIENT: CPT

## 2020-12-24 PROCEDURE — 99072 ADDL SUPL MATRL&STAF TM PHE: CPT

## 2020-12-24 PROCEDURE — 92015 DETERMINE REFRACTIVE STATE: CPT

## 2020-12-24 PROCEDURE — 92083 EXTENDED VISUAL FIELD XM: CPT

## 2021-01-14 ENCOUNTER — APPOINTMENT (OUTPATIENT)
Dept: ULTRASOUND IMAGING | Facility: IMAGING CENTER | Age: 76
End: 2021-01-14
Payer: MEDICARE

## 2021-01-14 ENCOUNTER — APPOINTMENT (OUTPATIENT)
Dept: RADIOLOGY | Facility: IMAGING CENTER | Age: 76
End: 2021-01-14
Payer: MEDICARE

## 2021-01-14 ENCOUNTER — APPOINTMENT (OUTPATIENT)
Dept: MAMMOGRAPHY | Facility: IMAGING CENTER | Age: 76
End: 2021-01-14
Payer: MEDICARE

## 2021-01-14 ENCOUNTER — RESULT REVIEW (OUTPATIENT)
Age: 76
End: 2021-01-14

## 2021-01-14 ENCOUNTER — OUTPATIENT (OUTPATIENT)
Dept: OUTPATIENT SERVICES | Facility: HOSPITAL | Age: 76
LOS: 1 days | End: 2021-01-14
Payer: MEDICARE

## 2021-01-14 DIAGNOSIS — R10.9 UNSPECIFIED ABDOMINAL PAIN: ICD-10-CM

## 2021-01-14 DIAGNOSIS — Z00.8 ENCOUNTER FOR OTHER GENERAL EXAMINATION: ICD-10-CM

## 2021-01-14 DIAGNOSIS — Z98.41 CATARACT EXTRACTION STATUS, RIGHT EYE: Chronic | ICD-10-CM

## 2021-01-14 PROCEDURE — 76856 US EXAM PELVIC COMPLETE: CPT | Mod: 26

## 2021-01-14 PROCEDURE — 77067 SCR MAMMO BI INCL CAD: CPT | Mod: 26

## 2021-01-14 PROCEDURE — 77063 BREAST TOMOSYNTHESIS BI: CPT | Mod: 26

## 2021-01-14 PROCEDURE — 71046 X-RAY EXAM CHEST 2 VIEWS: CPT | Mod: 26

## 2021-01-14 PROCEDURE — 76830 TRANSVAGINAL US NON-OB: CPT | Mod: 26

## 2021-01-14 PROCEDURE — 76705 ECHO EXAM OF ABDOMEN: CPT | Mod: 26

## 2021-01-14 PROCEDURE — 77063 BREAST TOMOSYNTHESIS BI: CPT

## 2021-01-14 PROCEDURE — 77080 DXA BONE DENSITY AXIAL: CPT | Mod: 26

## 2021-01-14 PROCEDURE — 76856 US EXAM PELVIC COMPLETE: CPT

## 2021-01-14 PROCEDURE — 76830 TRANSVAGINAL US NON-OB: CPT

## 2021-01-14 PROCEDURE — 77080 DXA BONE DENSITY AXIAL: CPT

## 2021-01-14 PROCEDURE — 71046 X-RAY EXAM CHEST 2 VIEWS: CPT

## 2021-01-14 PROCEDURE — 76705 ECHO EXAM OF ABDOMEN: CPT

## 2021-01-14 PROCEDURE — 77067 SCR MAMMO BI INCL CAD: CPT

## 2021-01-20 ENCOUNTER — APPOINTMENT (OUTPATIENT)
Dept: CARDIOLOGY | Facility: CLINIC | Age: 76
End: 2021-01-20
Payer: MEDICARE

## 2021-01-20 ENCOUNTER — NON-APPOINTMENT (OUTPATIENT)
Age: 76
End: 2021-01-20

## 2021-01-20 VITALS
HEART RATE: 78 BPM | DIASTOLIC BLOOD PRESSURE: 89 MMHG | OXYGEN SATURATION: 99 % | RESPIRATION RATE: 17 BRPM | TEMPERATURE: 97.7 F | SYSTOLIC BLOOD PRESSURE: 152 MMHG | BODY MASS INDEX: 30.21 KG/M2 | WEIGHT: 160 LBS | HEIGHT: 61 IN

## 2021-01-20 PROCEDURE — 99204 OFFICE O/P NEW MOD 45 MIN: CPT

## 2021-01-20 PROCEDURE — 93000 ELECTROCARDIOGRAM COMPLETE: CPT

## 2021-01-20 PROCEDURE — 99072 ADDL SUPL MATRL&STAF TM PHE: CPT

## 2021-01-20 NOTE — PHYSICAL EXAM
[General Appearance - Well Developed] : well developed [Normal Appearance] : normal appearance [Well Groomed] : well groomed [General Appearance - Well Nourished] : well nourished [No Deformities] : no deformities [General Appearance - In No Acute Distress] : no acute distress [Normal Conjunctiva] : the conjunctiva exhibited no abnormalities [Eyelids - No Xanthelasma] : the eyelids demonstrated no xanthelasmas [Normal Oral Mucosa] : normal oral mucosa [No Oral Pallor] : no oral pallor [No Oral Cyanosis] : no oral cyanosis [Normal Jugular Venous A Waves Present] : normal jugular venous A waves present [Normal Jugular Venous V Waves Present] : normal jugular venous V waves present [No Jugular Venous Barcenas A Waves] : no jugular venous barcenas A waves [Heart Rate And Rhythm] : heart rate and rhythm were normal [Heart Sounds] : normal S1 and S2 [Murmurs] : no murmurs present [Respiration, Rhythm And Depth] : normal respiratory rhythm and effort [Exaggerated Use Of Accessory Muscles For Inspiration] : no accessory muscle use [Auscultation Breath Sounds / Voice Sounds] : lungs were clear to auscultation bilaterally [Abdomen Soft] : soft [Abdomen Tenderness] : non-tender [Abdomen Mass (___ Cm)] : no abdominal mass palpated [Abnormal Walk] : normal gait [Gait - Sufficient For Exercise Testing] : the gait was sufficient for exercise testing [Nail Clubbing] : no clubbing of the fingernails [Cyanosis, Localized] : no localized cyanosis [Petechial Hemorrhages (___cm)] : no petechial hemorrhages [Skin Color & Pigmentation] : normal skin color and pigmentation [] : no rash [No Venous Stasis] : no venous stasis [Skin Lesions] : no skin lesions [No Skin Ulcers] : no skin ulcer [No Xanthoma] : no  xanthoma was observed [Oriented To Time, Place, And Person] : oriented to person, place, and time [Affect] : the affect was normal [Mood] : the mood was normal [No Anxiety] : not feeling anxious

## 2021-01-20 NOTE — HISTORY OF PRESENT ILLNESS
[FreeTextEntry1] : 75-year-old female with a history of hypertension, an abnormal EKG, mitral regurgitation, hypercholesterolemia, and chronic myelogenous leukemia.  She was seen in evaluation in 2016 because of an abnormal EKG which showed voltage criteria for LVH along with some variable T wave abnormalities..  Her echo showed only moderate mitral regurgitation and borderline pulmonary hypertension and her left ventricle was normal in size.  \par \par She has been generally well since and remains active without any cardiac complaints. and her CML remains in remission.  Her EKGs have not changed, but she recently had a chest x-ray which showed cardiomegaly.

## 2021-01-20 NOTE — REVIEW OF SYSTEMS
[Feeling Fatigued] : feeling fatigued [Joint Pain] : joint pain [Joint Stiffness] : joint stiffness [Negative] : Heme/Lymph [Palpitations] : no palpitations

## 2021-01-20 NOTE — DISCUSSION/SUMMARY
[FreeTextEntry1] : Mrs. Al remains without cardiac symptoms and appears about the same as she did a few years ago.  Her blood pressure is mildly elevated and most of her office readings are also elevated.  Her chest is clear, her cardiac exam unchanged, and there is no peripheral edema.  An EKG continues to show sinus rhythm with voltage criteria for LVH.\par \par She should have repeat echocardiography and will be scheduled.  aAso, I think her antihypertensive regimen needs to be increased and her metoprolol was changed to carvedilol 25 twice daily.  In addition, she is hypercholesterolemic with LDLs averaging about 150, so a repeat level was drawn and she was started on rosuvastatin 20.  Her BP and cholesterol will be rechecked with the appointment for the echocardiogram..

## 2021-01-21 LAB
CHOLEST SERPL-MCNC: 257 MG/DL
HDLC SERPL-MCNC: 76 MG/DL
LDLC SERPL CALC-MCNC: 157 MG/DL
NONHDLC SERPL-MCNC: 181 MG/DL
TRIGL SERPL-MCNC: 121 MG/DL

## 2021-02-10 ENCOUNTER — APPOINTMENT (OUTPATIENT)
Dept: CARDIOLOGY | Facility: CLINIC | Age: 76
End: 2021-02-10
Payer: MEDICARE

## 2021-02-10 VITALS
SYSTOLIC BLOOD PRESSURE: 150 MMHG | HEIGHT: 61 IN | HEART RATE: 69 BPM | RESPIRATION RATE: 17 BRPM | DIASTOLIC BLOOD PRESSURE: 85 MMHG | BODY MASS INDEX: 30.78 KG/M2 | TEMPERATURE: 98.4 F | WEIGHT: 163 LBS | OXYGEN SATURATION: 100 %

## 2021-02-10 VITALS — SYSTOLIC BLOOD PRESSURE: 140 MMHG | DIASTOLIC BLOOD PRESSURE: 85 MMHG

## 2021-02-10 PROCEDURE — 99072 ADDL SUPL MATRL&STAF TM PHE: CPT

## 2021-02-10 PROCEDURE — 93306 TTE W/DOPPLER COMPLETE: CPT

## 2021-02-10 PROCEDURE — 99214 OFFICE O/P EST MOD 30 MIN: CPT | Mod: 25

## 2021-02-10 NOTE — DISCUSSION/SUMMARY
[FreeTextEntry1] : Mrs. Al has no symptoms.  Her exam shows an improvement in her blood pressure down to about 140/85 on her new regimen.  Her cardiac exam is unchanged.\par \par Her echocardiogram was repeated.  It is similar to 2016 with moderate mitral, aortic, and tricuspid regurgitations, but with normal left ventricular size and contractility.\par \par She will continue on her current regimen.  Her lipids need to be checked and she has a visit to see her internist in the next week or 2.  She will follow-up here in 6 months.

## 2021-02-10 NOTE — HISTORY OF PRESENT ILLNESS
[FreeTextEntry1] : 75-year-old female with a history of hypertension and hypercholesterolemia returns for follow-up and repeat echocardiography.  She was started on rosuvastatin 20 and her metoprolol was changed to carvedilol a few weeks ago because of high cholesterol and blood pressure.  She is tolerating the change without difficulty and has no current symptoms.

## 2021-02-10 NOTE — PHYSICAL EXAM
[General Appearance - Well Developed] : well developed [Normal Appearance] : normal appearance [Well Groomed] : well groomed [General Appearance - Well Nourished] : well nourished [No Deformities] : no deformities [General Appearance - In No Acute Distress] : no acute distress [Normal Conjunctiva] : the conjunctiva exhibited no abnormalities [Eyelids - No Xanthelasma] : the eyelids demonstrated no xanthelasmas [Normal Oral Mucosa] : normal oral mucosa [No Oral Pallor] : no oral pallor [No Oral Cyanosis] : no oral cyanosis [Normal Jugular Venous A Waves Present] : normal jugular venous A waves present [Normal Jugular Venous V Waves Present] : normal jugular venous V waves present [No Jugular Venous Barcenas A Waves] : no jugular venous barcenas A waves [Respiration, Rhythm And Depth] : normal respiratory rhythm and effort [Exaggerated Use Of Accessory Muscles For Inspiration] : no accessory muscle use [Auscultation Breath Sounds / Voice Sounds] : lungs were clear to auscultation bilaterally [Heart Rate And Rhythm] : heart rate and rhythm were normal [Heart Sounds] : normal S1 and S2 [FreeTextEntry1] : Systolic murmur left sternal border [Abdomen Soft] : soft [Abdomen Tenderness] : non-tender [Abdomen Mass (___ Cm)] : no abdominal mass palpated [Abnormal Walk] : normal gait [Gait - Sufficient For Exercise Testing] : the gait was sufficient for exercise testing [Nail Clubbing] : no clubbing of the fingernails [Cyanosis, Localized] : no localized cyanosis [Petechial Hemorrhages (___cm)] : no petechial hemorrhages [Skin Color & Pigmentation] : normal skin color and pigmentation [] : no rash [No Venous Stasis] : no venous stasis [Skin Lesions] : no skin lesions [No Skin Ulcers] : no skin ulcer [No Xanthoma] : no  xanthoma was observed [Oriented To Time, Place, And Person] : oriented to person, place, and time [Affect] : the affect was normal [Mood] : the mood was normal [No Anxiety] : not feeling anxious

## 2021-02-19 ENCOUNTER — APPOINTMENT (OUTPATIENT)
Dept: INTERNAL MEDICINE | Facility: CLINIC | Age: 76
End: 2021-02-19
Payer: MEDICARE

## 2021-02-19 VITALS
HEIGHT: 61 IN | BODY MASS INDEX: 31.15 KG/M2 | SYSTOLIC BLOOD PRESSURE: 148 MMHG | HEART RATE: 71 BPM | WEIGHT: 165 LBS | OXYGEN SATURATION: 97 % | DIASTOLIC BLOOD PRESSURE: 70 MMHG

## 2021-02-19 VITALS — DIASTOLIC BLOOD PRESSURE: 76 MMHG | SYSTOLIC BLOOD PRESSURE: 140 MMHG

## 2021-02-19 PROCEDURE — 99072 ADDL SUPL MATRL&STAF TM PHE: CPT

## 2021-02-19 PROCEDURE — 99214 OFFICE O/P EST MOD 30 MIN: CPT

## 2021-02-20 NOTE — PHYSICAL EXAM
[No Acute Distress] : no acute distress [Well-Appearing] : well-appearing [EOMI] : extraocular movements intact [No Lymphadenopathy] : no lymphadenopathy [No Respiratory Distress] : no respiratory distress  [Clear to Auscultation] : lungs were clear to auscultation bilaterally [Normal Rate] : normal rate  [Regular Rhythm] : with a regular rhythm [Normal S1, S2] : normal S1 and S2 [No Edema] : there was no peripheral edema [Soft] : abdomen soft [Non Tender] : non-tender [Normal Bowel Sounds] : normal bowel sounds [No CVA Tenderness] : no CVA  tenderness [No Spinal Tenderness] : no spinal tenderness

## 2021-02-21 NOTE — ASSESSMENT
[FreeTextEntry1] : Reviewed recent US : diffusely coarsened hepatic echotexture - no focal lesions. \par Will refer to Hepatology. recheck LFT's to trend, chris with statin

## 2021-02-21 NOTE — HISTORY OF PRESENT ILLNESS
[FreeTextEntry1] : BP check - newly on carvedilol\par needs FLP and LFT's - recently started on statin [de-identified] : Reports she had some leg swelling which has resolved with new meds.\par Getting some side effects (dizziness ) from Carvedilol - doesn’t last long.  only feels it in the morning and then resolves.  So far is tolerable.  \par got both COVID vaccine doses - Pfizer - 1/6 and 2/5

## 2021-02-25 LAB
ALBUMIN SERPL ELPH-MCNC: 4.5 G/DL
ALP BLD-CCNC: 56 U/L
ALT SERPL-CCNC: 16 U/L
ANION GAP SERPL CALC-SCNC: 13 MMOL/L
AST SERPL-CCNC: 16 U/L
BILIRUB SERPL-MCNC: 0.7 MG/DL
BUN SERPL-MCNC: 12 MG/DL
CALCIUM SERPL-MCNC: 9.1 MG/DL
CHLORIDE SERPL-SCNC: 103 MMOL/L
CHOLEST SERPL-MCNC: 177 MG/DL
CO2 SERPL-SCNC: 25 MMOL/L
CREAT SERPL-MCNC: 0.88 MG/DL
GLUCOSE SERPL-MCNC: 85 MG/DL
HDLC SERPL-MCNC: 75 MG/DL
LDLC SERPL CALC-MCNC: 88 MG/DL
NONHDLC SERPL-MCNC: 102 MG/DL
POTASSIUM SERPL-SCNC: 4 MMOL/L
PROT SERPL-MCNC: 7.4 G/DL
SODIUM SERPL-SCNC: 141 MMOL/L
TRIGL SERPL-MCNC: 70 MG/DL

## 2021-03-25 ENCOUNTER — RX RENEWAL (OUTPATIENT)
Age: 76
End: 2021-03-25

## 2021-03-25 RX ORDER — DICLOFENAC SODIUM 1% 10 MG/G
1 GEL TOPICAL
Qty: 100 | Refills: 2 | Status: ACTIVE | COMMUNITY
Start: 2021-03-25 | End: 1900-01-01

## 2021-05-27 ENCOUNTER — APPOINTMENT (OUTPATIENT)
Dept: OPHTHALMOLOGY | Facility: CLINIC | Age: 76
End: 2021-05-27
Payer: MEDICARE

## 2021-05-27 ENCOUNTER — NON-APPOINTMENT (OUTPATIENT)
Age: 76
End: 2021-05-27

## 2021-05-27 PROCEDURE — 92014 COMPRE OPH EXAM EST PT 1/>: CPT

## 2021-05-27 PROCEDURE — 99072 ADDL SUPL MATRL&STAF TM PHE: CPT

## 2021-05-27 PROCEDURE — 92083 EXTENDED VISUAL FIELD XM: CPT

## 2021-06-08 ENCOUNTER — APPOINTMENT (OUTPATIENT)
Dept: HEPATOLOGY | Facility: CLINIC | Age: 76
End: 2021-06-08

## 2021-06-09 ENCOUNTER — OUTPATIENT (OUTPATIENT)
Dept: OUTPATIENT SERVICES | Facility: HOSPITAL | Age: 76
LOS: 1 days | Discharge: HOME | End: 2021-06-09

## 2021-06-09 DIAGNOSIS — C93.10 CHRONIC MYELOMONOCYTIC LEUKEMIA NOT HAVING ACHIEVED REMISSION: ICD-10-CM

## 2021-06-09 DIAGNOSIS — Z98.41 CATARACT EXTRACTION STATUS, RIGHT EYE: Chronic | ICD-10-CM

## 2021-06-11 ENCOUNTER — RESULT REVIEW (OUTPATIENT)
Age: 76
End: 2021-06-11

## 2021-06-11 ENCOUNTER — APPOINTMENT (OUTPATIENT)
Dept: HEMATOLOGY ONCOLOGY | Facility: CLINIC | Age: 76
End: 2021-06-11
Payer: MEDICARE

## 2021-06-11 VITALS
WEIGHT: 166.23 LBS | RESPIRATION RATE: 17 BRPM | DIASTOLIC BLOOD PRESSURE: 95 MMHG | HEART RATE: 70 BPM | SYSTOLIC BLOOD PRESSURE: 178 MMHG | HEIGHT: 60.98 IN | BODY MASS INDEX: 31.38 KG/M2 | TEMPERATURE: 97.9 F | OXYGEN SATURATION: 100 %

## 2021-06-11 DIAGNOSIS — I34.0 NONRHEUMATIC MITRAL (VALVE) INSUFFICIENCY: ICD-10-CM

## 2021-06-11 DIAGNOSIS — H40.9 UNSPECIFIED GLAUCOMA: ICD-10-CM

## 2021-06-11 LAB
BASOPHILS # BLD AUTO: 0.03 K/UL — SIGNIFICANT CHANGE UP (ref 0–0.2)
BASOPHILS NFR BLD AUTO: 0.5 % — SIGNIFICANT CHANGE UP (ref 0–2)
EOSINOPHIL # BLD AUTO: 0.21 K/UL — SIGNIFICANT CHANGE UP (ref 0–0.5)
EOSINOPHIL NFR BLD AUTO: 3.8 % — SIGNIFICANT CHANGE UP (ref 0–6)
HCT VFR BLD CALC: 42 % — SIGNIFICANT CHANGE UP (ref 34.5–45)
HGB BLD-MCNC: 13.7 G/DL — SIGNIFICANT CHANGE UP (ref 11.5–15.5)
IMM GRANULOCYTES NFR BLD AUTO: 0.4 % — SIGNIFICANT CHANGE UP (ref 0–1.5)
LYMPHOCYTES # BLD AUTO: 1.75 K/UL — SIGNIFICANT CHANGE UP (ref 1–3.3)
LYMPHOCYTES # BLD AUTO: 31.6 % — SIGNIFICANT CHANGE UP (ref 13–44)
MCHC RBC-ENTMCNC: 29.1 PG — SIGNIFICANT CHANGE UP (ref 27–34)
MCHC RBC-ENTMCNC: 32.6 G/DL — SIGNIFICANT CHANGE UP (ref 32–36)
MCV RBC AUTO: 89.2 FL — SIGNIFICANT CHANGE UP (ref 80–100)
MONOCYTES # BLD AUTO: 0.42 K/UL — SIGNIFICANT CHANGE UP (ref 0–0.9)
MONOCYTES NFR BLD AUTO: 7.6 % — SIGNIFICANT CHANGE UP (ref 2–14)
NEUTROPHILS # BLD AUTO: 3.11 K/UL — SIGNIFICANT CHANGE UP (ref 1.8–7.4)
NEUTROPHILS NFR BLD AUTO: 56.1 % — SIGNIFICANT CHANGE UP (ref 43–77)
NRBC # BLD: 0 /100 WBCS — SIGNIFICANT CHANGE UP (ref 0–0)
PLATELET # BLD AUTO: 198 K/UL — SIGNIFICANT CHANGE UP (ref 150–400)
RBC # BLD: 4.71 M/UL — SIGNIFICANT CHANGE UP (ref 3.8–5.2)
RBC # FLD: 14 % — SIGNIFICANT CHANGE UP (ref 10.3–14.5)
WBC # BLD: 5.54 K/UL — SIGNIFICANT CHANGE UP (ref 3.8–10.5)
WBC # FLD AUTO: 5.54 K/UL — SIGNIFICANT CHANGE UP (ref 3.8–10.5)

## 2021-06-11 PROCEDURE — 99072 ADDL SUPL MATRL&STAF TM PHE: CPT

## 2021-06-11 PROCEDURE — 99214 OFFICE O/P EST MOD 30 MIN: CPT

## 2021-06-11 RX ORDER — ESCITALOPRAM OXALATE 10 MG/1
10 TABLET ORAL
Qty: 30 | Refills: 3 | Status: DISCONTINUED | COMMUNITY
Start: 2019-11-21 | End: 2021-06-11

## 2021-06-11 RX ORDER — DICLOFENAC SODIUM 10 MG/G
1 GEL TOPICAL
Qty: 1 | Refills: 2 | Status: DISCONTINUED | COMMUNITY
Start: 2020-03-19 | End: 2021-06-11

## 2021-06-13 LAB
ALBUMIN SERPL ELPH-MCNC: 4.5 G/DL
ALP BLD-CCNC: 60 U/L
ALT SERPL-CCNC: 14 U/L
ANION GAP SERPL CALC-SCNC: 15 MMOL/L
AST SERPL-CCNC: 17 U/L
BILIRUB SERPL-MCNC: 0.7 MG/DL
BUN SERPL-MCNC: 9 MG/DL
CALCIUM SERPL-MCNC: 9.1 MG/DL
CHLORIDE SERPL-SCNC: 104 MMOL/L
CO2 SERPL-SCNC: 25 MMOL/L
CREAT SERPL-MCNC: 0.73 MG/DL
POTASSIUM SERPL-SCNC: 3.6 MMOL/L
PROT SERPL-MCNC: 7.2 G/DL
SODIUM SERPL-SCNC: 144 MMOL/L

## 2021-06-13 NOTE — ASSESSMENT
[Palliative Care Plan] : not applicable at this time [FreeTextEntry1] : HANG Al is a 76 year old female with a 98 month history of diagnosis and treatment of chronic myeloid leukemia; she is currently receiving treatment with nilotinib and she is tolerating therapy well. She remains in a hematologic complete response and a very good molecular response. No fatigue no weakness and no rise in platelet count. No recent history of bleeding or thrombosis and she has received vaccination against COV 2 without thrombotic tendency underscoring the safety of vaccination.\par SHe is functional hat home and is in the care of her  who has disability post neurosurgical procedure 3 years ago. Management will continue as before and she will be sen in follow up in four months by RODOLFO Gonzalez ANP

## 2021-06-13 NOTE — HISTORY OF PRESENT ILLNESS
[Disease:__________________________] : Disease: [unfilled] [Treatment Protocol] : Treatment Protocol [Date: ____________] : Patient's last distress assessment performed on [unfilled]. [0 - No Distress] : Distress Level: 0 [Cardiovascular] : Cardiovascular [Constitutional] : Constitutional [ENT] : ENT [Dermatologic] : Dermatologic [Endocrine] : Endocrine [Gastrointestinal] : Gastrointestinal [Genitourinary] : Genitourinary [Gynecologic] : Gynecologic [Infectious] : Infectious [Musculoskeletal] : Musculoskeletal [Neurologic] : Neurologic [Pain] : Pain [Pulmonary] : Pulmonary [Hematologic] : Hematologic [de-identified] : 75 year old female with past medical history of hypertension presenting to [facility] for hemat[facility] care. Patient was initially diagnosed with CML in April 2012. She was admitted to [facility] and was seen by Dr Bland of the transfusion service who performed several pheresis procedures to reduce a high platelet count (approximately 3,000,000). She was subsequently started on treatment with imatinib 400 mg PO daily and she was in compliance with treatment for three years. She discontinued treatment on her volition in early 2015. \par \par On March 2015, she was reportedly feeling more tired with anorexia weight loss and presented at PMD with thrombocytosis (2.1 million platelet count). She was admitted with platelet count of 2,088K and underwent multiple cycles of plasmaphereses with improvement of thrombocytosis and begun on nilotinib and hydroxyurea. She stated at the time of admission that she stopped taking Gleevec as well as other antihypertensives as she felt she did not need them. Her hospital course was complicated by herpes zoster with resolution on Valtrex as well as APC's which resolved with metoprolol. She was ultimately discharged on 4/17/2015. She has tolerated the nilotinib without side effects. [FreeTextEntry1] : nilotinib 150 mg tablets PO 2 tablet in AM, 1 tablet in PM [de-identified] : Patient presented to the office for routine follow-up visit. She continues to take her nilotinib as directed. Her blood pressure medication was recently increased to Amlodipine 10 mg daily. She also admitted to persistent shortness of breath but denied fever/chills, hemoptysis, recent trauma/injury, weight loss at this time. Patient has remained indoors for the majority of her time over the last 6 months due to the current pandemic; denied recent hospitalizations, infections. 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

## 2021-06-13 NOTE — REVIEW OF SYSTEMS
[Negative] : Allergic/Immunologic [Shortness Of Breath] : no shortness of breath [Wheezing] : no wheezing [Cough] : no cough [SOB on Exertion] : no shortness of breath during exertion

## 2021-06-30 ENCOUNTER — RX RENEWAL (OUTPATIENT)
Age: 76
End: 2021-06-30

## 2021-08-20 ENCOUNTER — APPOINTMENT (OUTPATIENT)
Dept: CARDIOLOGY | Facility: CLINIC | Age: 76
End: 2021-08-20
Payer: MEDICARE

## 2021-08-20 ENCOUNTER — NON-APPOINTMENT (OUTPATIENT)
Age: 76
End: 2021-08-20

## 2021-08-20 VITALS
HEIGHT: 60.98 IN | BODY MASS INDEX: 32.1 KG/M2 | DIASTOLIC BLOOD PRESSURE: 101 MMHG | SYSTOLIC BLOOD PRESSURE: 181 MMHG | WEIGHT: 170 LBS | HEART RATE: 88 BPM

## 2021-08-20 VITALS — DIASTOLIC BLOOD PRESSURE: 95 MMHG | SYSTOLIC BLOOD PRESSURE: 165 MMHG

## 2021-08-20 DIAGNOSIS — R53.83 OTHER FATIGUE: ICD-10-CM

## 2021-08-20 PROCEDURE — 93000 ELECTROCARDIOGRAM COMPLETE: CPT

## 2021-08-20 PROCEDURE — 99213 OFFICE O/P EST LOW 20 MIN: CPT

## 2021-08-20 NOTE — HISTORY OF PRESENT ILLNESS
[FreeTextEntry1] : 76-year-old female with a history of hypertension, hypercholesterolemia, an abnormal baseline EKG, and CML.  She has been under a lot of stress recently with her daughter being hospitalized for psychiatric reasons and she is the primary caregiver of her 6-year-old great granddaughter.  She had some swelling in her right foot recently which she attributed to medication.  She has discontinued carvedilol and rosuvastatin and the swelling is improved.

## 2021-08-20 NOTE — REVIEW OF SYSTEMS
[Feeling Fatigued] : feeling fatigued [Lower Ext Edema] : lower extremity edema [Joint Pain] : joint pain [Negative] : Neurological

## 2021-08-20 NOTE — ASSESSMENT
[FreeTextEntry1] : Mrs. Al recently discontinued rosuvastatin and carvedilol because she thought they might be causing pain and swelling in her right foot.  Her exam shows elevated blood pressure about 160/95, clear lungs, and a normal cardiac exam.  The right foot is normal at present.\par \par I told her I thought it was very unlikely that medications cause the pain and swelling and she agrees to restart both.  She will follow-up in 4 months.

## 2021-09-09 ENCOUNTER — NON-APPOINTMENT (OUTPATIENT)
Age: 76
End: 2021-09-09

## 2021-09-09 ENCOUNTER — APPOINTMENT (OUTPATIENT)
Dept: OPHTHALMOLOGY | Facility: CLINIC | Age: 76
End: 2021-09-09
Payer: MEDICARE

## 2021-09-09 PROCEDURE — 92012 INTRM OPH EXAM EST PATIENT: CPT

## 2021-09-09 PROCEDURE — 92133 CPTRZD OPH DX IMG PST SGM ON: CPT

## 2021-10-13 ENCOUNTER — OUTPATIENT (OUTPATIENT)
Dept: OUTPATIENT SERVICES | Facility: HOSPITAL | Age: 76
LOS: 1 days | Discharge: HOME | End: 2021-10-13

## 2021-10-13 DIAGNOSIS — C93.10 CHRONIC MYELOMONOCYTIC LEUKEMIA NOT HAVING ACHIEVED REMISSION: ICD-10-CM

## 2021-10-13 DIAGNOSIS — Z98.41 CATARACT EXTRACTION STATUS, RIGHT EYE: Chronic | ICD-10-CM

## 2021-10-15 ENCOUNTER — APPOINTMENT (OUTPATIENT)
Dept: ULTRASOUND IMAGING | Facility: IMAGING CENTER | Age: 76
End: 2021-10-15
Payer: MEDICARE

## 2021-10-15 ENCOUNTER — RESULT REVIEW (OUTPATIENT)
Age: 76
End: 2021-10-15

## 2021-10-15 ENCOUNTER — APPOINTMENT (OUTPATIENT)
Dept: HEMATOLOGY ONCOLOGY | Facility: CLINIC | Age: 76
End: 2021-10-15
Payer: MEDICARE

## 2021-10-15 ENCOUNTER — OUTPATIENT (OUTPATIENT)
Dept: OUTPATIENT SERVICES | Facility: HOSPITAL | Age: 76
LOS: 1 days | End: 2021-10-15
Payer: MEDICARE

## 2021-10-15 VITALS
OXYGEN SATURATION: 97 % | WEIGHT: 169.73 LBS | TEMPERATURE: 97.8 F | HEART RATE: 81 BPM | DIASTOLIC BLOOD PRESSURE: 98 MMHG | BODY MASS INDEX: 31.23 KG/M2 | SYSTOLIC BLOOD PRESSURE: 162 MMHG | RESPIRATION RATE: 18 BRPM | HEIGHT: 61.93 IN

## 2021-10-15 DIAGNOSIS — Z98.41 CATARACT EXTRACTION STATUS, RIGHT EYE: Chronic | ICD-10-CM

## 2021-10-15 DIAGNOSIS — C92.10 CHRONIC MYELOID LEUKEMIA, BCR/ABL-POSITIVE, NOT HAVING ACHIEVED REMISSION: ICD-10-CM

## 2021-10-15 DIAGNOSIS — R25.2 CRAMP AND SPASM: ICD-10-CM

## 2021-10-15 LAB
BASOPHILS # BLD AUTO: 0.04 K/UL — SIGNIFICANT CHANGE UP (ref 0–0.2)
BASOPHILS NFR BLD AUTO: 0.7 % — SIGNIFICANT CHANGE UP (ref 0–2)
EOSINOPHIL # BLD AUTO: 0.28 K/UL — SIGNIFICANT CHANGE UP (ref 0–0.5)
EOSINOPHIL NFR BLD AUTO: 5.2 % — SIGNIFICANT CHANGE UP (ref 0–6)
HCT VFR BLD CALC: 41.8 % — SIGNIFICANT CHANGE UP (ref 34.5–45)
HGB BLD-MCNC: 13.3 G/DL — SIGNIFICANT CHANGE UP (ref 11.5–15.5)
IMM GRANULOCYTES NFR BLD AUTO: 0.4 % — SIGNIFICANT CHANGE UP (ref 0–1.5)
LYMPHOCYTES # BLD AUTO: 1.7 K/UL — SIGNIFICANT CHANGE UP (ref 1–3.3)
LYMPHOCYTES # BLD AUTO: 31.6 % — SIGNIFICANT CHANGE UP (ref 13–44)
MCHC RBC-ENTMCNC: 28.2 PG — SIGNIFICANT CHANGE UP (ref 27–34)
MCHC RBC-ENTMCNC: 31.8 G/DL — LOW (ref 32–36)
MCV RBC AUTO: 88.7 FL — SIGNIFICANT CHANGE UP (ref 80–100)
MONOCYTES # BLD AUTO: 0.44 K/UL — SIGNIFICANT CHANGE UP (ref 0–0.9)
MONOCYTES NFR BLD AUTO: 8.2 % — SIGNIFICANT CHANGE UP (ref 2–14)
NEUTROPHILS # BLD AUTO: 2.9 K/UL — SIGNIFICANT CHANGE UP (ref 1.8–7.4)
NEUTROPHILS NFR BLD AUTO: 53.9 % — SIGNIFICANT CHANGE UP (ref 43–77)
NRBC # BLD: 0 /100 WBCS — SIGNIFICANT CHANGE UP (ref 0–0)
PLATELET # BLD AUTO: 202 K/UL — SIGNIFICANT CHANGE UP (ref 150–400)
RBC # BLD: 4.71 M/UL — SIGNIFICANT CHANGE UP (ref 3.8–5.2)
RBC # FLD: 13.9 % — SIGNIFICANT CHANGE UP (ref 10.3–14.5)
WBC # BLD: 5.38 K/UL — SIGNIFICANT CHANGE UP (ref 3.8–10.5)
WBC # FLD AUTO: 5.38 K/UL — SIGNIFICANT CHANGE UP (ref 3.8–10.5)

## 2021-10-15 PROCEDURE — 99214 OFFICE O/P EST MOD 30 MIN: CPT

## 2021-10-15 PROCEDURE — 93970 EXTREMITY STUDY: CPT

## 2021-10-15 PROCEDURE — 93970 EXTREMITY STUDY: CPT | Mod: 26

## 2021-10-16 PROBLEM — R25.2 LEG CRAMPS: Status: ACTIVE | Noted: 2021-10-16

## 2021-10-16 LAB
ALBUMIN SERPL ELPH-MCNC: 4.3 G/DL
ALP BLD-CCNC: 60 U/L
ALT SERPL-CCNC: 11 U/L
ANION GAP SERPL CALC-SCNC: 13 MMOL/L
AST SERPL-CCNC: 16 U/L
BILIRUB SERPL-MCNC: 0.9 MG/DL
BUN SERPL-MCNC: 11 MG/DL
CALCIUM SERPL-MCNC: 9.1 MG/DL
CHLORIDE SERPL-SCNC: 103 MMOL/L
CHOLEST SERPL-MCNC: 270 MG/DL
CO2 SERPL-SCNC: 29 MMOL/L
COVID-19 SPIKE DOMAIN ANTIBODY INTERPRETATION: POSITIVE
CREAT SERPL-MCNC: 0.68 MG/DL
GLUCOSE SERPL-MCNC: 97 MG/DL
HDLC SERPL-MCNC: 73 MG/DL
LDH SERPL-CCNC: 204 U/L
LDLC SERPL CALC-MCNC: 176 MG/DL
NONHDLC SERPL-MCNC: 196 MG/DL
POTASSIUM SERPL-SCNC: 3.2 MMOL/L
PROT SERPL-MCNC: 7 G/DL
SARS-COV-2 AB SERPL IA-ACNC: >250 U/ML
SODIUM SERPL-SCNC: 145 MMOL/L
TRIGL SERPL-MCNC: 99 MG/DL
URATE SERPL-MCNC: 7.1 MG/DL

## 2021-10-16 NOTE — PHYSICAL EXAM
[Restricted in physically strenuous activity but ambulatory and able to carry out work of a light or sedentary nature] : Status 1- Restricted in physically strenuous activity but ambulatory and able to carry out work of a light or sedentary nature, e.g., light house work, office work [Normal] : affect appropriate [de-identified] : Trace RLE ankle swelling;  varices bilaterally

## 2021-10-16 NOTE — ASSESSMENT
[Palliative Care Plan] : not applicable at this time [FreeTextEntry1] : HANG Al is a 76 year old female with a 8 year, 6 month history of diagnosis and treatment of chronic myeloid leukemia; she is currently receiving treatment with nilotinib and she is tolerating therapy well. \par \par She remains in a hematologic complete response and a very good molecular response. No fatigue no weakness and no rise in platelet count. No recent history of bleeding or thrombosis and she has received vaccination against COV 2 without thrombotic tendency underscoring the safety of vaccination.\par \par Her baseline functional status is without change. She has had fatigue and intermittent leg cramping and RLE swelling and poorly controlled BP.  Her PE is significant for mild ankle swelling on the right. 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  \par \par Plan: \par CBC CMP BCR ABL LDH COVID 19 spike AB drawn today \par BCR ABL pending \par Venous Doppler bilateral LE negative\par Hypokalemia after holding spironolactone this week. She agrees to restarting at 25 mg po bid today and to increase intake of potassium rich foods over the next few days.\par She plans to F/U appt with her PMD Dr. Anderson regarding  antihypertensive management next week.She is taking amlodipine.\par RTO 3 months PRE V

## 2021-10-16 NOTE — REVIEW OF SYSTEMS
[Lower Ext Edema] : lower extremity edema [Fatigue] : fatigue [Joint Pain] : joint pain [Negative] : Cardiovascular [FreeTextEntry3] : glaucoma [FreeTextEntry4] : dry [FreeTextEntry9] : occ cramping in leg muscles

## 2021-10-16 NOTE — END OF VISIT
[Time Spent: ___ minutes] : I have spent [unfilled] minutes of time on the encounter. [>50% of the face to face encounter time was spent on counseling and/or coordination of care for ___] : Greater than 50% of the face to face encounter time was spent on counseling and/or coordination of care for [unfilled] [FreeTextEntry4] : Patient being seen as per physician's primary plan of care.

## 2021-10-16 NOTE — HISTORY OF PRESENT ILLNESS
[Disease:__________________________] : Disease: [unfilled] [Treatment Protocol] : Treatment Protocol [Cardiovascular] : Cardiovascular [Constitutional] : Constitutional [ENT] : ENT [Dermatologic] : Dermatologic [Endocrine] : Endocrine [Gastrointestinal] : Gastrointestinal [Genitourinary] : Genitourinary [Gynecologic] : Gynecologic [Infectious] : Infectious [Musculoskeletal] : Musculoskeletal [Neurologic] : Neurologic [Pain] : Pain [Pulmonary] : Pulmonary [Hematologic] : Hematologic [Date: ____________] : Patient's last distress assessment performed on [unfilled]. [0 - No Distress] : Distress Level: 0 [de-identified] : 75 year old female with past medical history of hypertension presenting to [facility] for hematologic care. Patient was initially diagnosed with CML in April 2012. She was admitted to [facility] and was seen by Dr Bland of the transfusion service who performed several pheresis procedures to reduce a high platelet count (approximately 3,000,000). She was subsequently started on treatment with imatinib 400 mg PO daily and she was in compliance with treatment for three years. She discontinued treatment on her volition in early 2015. \par \par On March 2015, she was reportedly feeling more tired with anorexia weight loss and presented at PMD with thrombocytosis (2.1 million platelet count). She was admitted with platelet count of 2,088K and underwent multiple cycles of plasmaphereses with improvement of thrombocytosis and begun on nilotinib and hydroxyurea. She stated at the time of admission that she stopped taking Gleevec as well as other antihypertensives as she felt she did not need them. Her hospital course was complicated by herpes zoster with resolution on Valtrex as well as APC's which resolved with metoprolol. She was ultimately discharged on 4/17/2015. She has tolerated the nilotinib without side effects. [FreeTextEntry1] : nilotinib 150 mg tablets PO 2 tablet in AM, 1 tablet in PM [de-identified] : No hospitalizations or recent infections. No SOB. \par \par F/U with Dr. Jay in June and encouraged to restart carvedilol and statin \par She c/o foot pain and swelling when taking both medications,  she has not restarted either. \par She c/o Intermittent cramping R>L calf\par BP elevated; she denies H/A of visual changes\par Latanaprost added for slight increase in IOP by opthalmology\par Mood is OK. \par \par Tolerating nilotinib well consistent with dosing\par Fatigued. \par Caring for her , 6 year old great granddaughter with the help of her adult daughter who is a nurse

## 2021-10-19 LAB — T(9;22)(ABL1,BCR)/CONTROL BLD/T: NORMAL

## 2021-11-11 ENCOUNTER — APPOINTMENT (OUTPATIENT)
Dept: OPHTHALMOLOGY | Facility: CLINIC | Age: 76
End: 2021-11-11
Payer: MEDICARE

## 2021-11-11 ENCOUNTER — NON-APPOINTMENT (OUTPATIENT)
Age: 76
End: 2021-11-11

## 2021-11-11 PROCEDURE — 92012 INTRM OPH EXAM EST PATIENT: CPT

## 2021-12-21 ENCOUNTER — APPOINTMENT (OUTPATIENT)
Dept: CARDIOLOGY | Facility: CLINIC | Age: 76
End: 2021-12-21

## 2022-01-12 ENCOUNTER — OUTPATIENT (OUTPATIENT)
Dept: OUTPATIENT SERVICES | Facility: HOSPITAL | Age: 77
LOS: 1 days | Discharge: HOME | End: 2022-01-12

## 2022-01-12 DIAGNOSIS — Z98.41 CATARACT EXTRACTION STATUS, RIGHT EYE: Chronic | ICD-10-CM

## 2022-01-12 DIAGNOSIS — C93.10 CHRONIC MYELOMONOCYTIC LEUKEMIA NOT HAVING ACHIEVED REMISSION: ICD-10-CM

## 2022-01-14 ENCOUNTER — APPOINTMENT (OUTPATIENT)
Dept: HEMATOLOGY ONCOLOGY | Facility: CLINIC | Age: 77
End: 2022-01-14
Payer: MEDICARE

## 2022-01-14 PROCEDURE — 99443: CPT

## 2022-01-16 NOTE — HISTORY OF PRESENT ILLNESS
[Home] : at home, [unfilled] , at the time of the visit. [Medical Office: ([facility])___] : at the medical office located in  [Disease:__________________________] : Disease: [unfilled] [Treatment Protocol] : Treatment Protocol [Date: ____________] : Patient's last distress assessment performed on [unfilled]. [0 - No Distress] : Distress Level: 0 [de-identified] : 76 year old female with past medical history of hypertension presenting to [facility] for hematologic care. Patient was initially diagnosed with CML in April 2012. She was admitted to [facility] and was seen by Dr Bland of the transfusion service who performed several pheresis procedures to reduce a high platelet count (approximately 3,000,000). She was subsequently started on treatment with imatinib 400 mg PO daily and she was in compliance with treatment for three years. She discontinued treatment on her volition in early 2015. \par \par On March 2015, she was reportedly feeling more tired with anorexia weight loss and presented at PMD with thrombocytosis (2.1 million platelet count). She was admitted with platelet count of 2,088K and underwent multiple cycles of plasmaphereses with improvement of thrombocytosis and begun on nilotinib and hydroxyurea. She stated at the time of admission that she stopped taking Gleevec as well as other antihypertensives as she felt she did not need them. Her hospital course was complicated by herpes zoster with resolution on Valtrex as well as APC's which resolved with metoprolol. She was ultimately discharged on 4/17/2015. She has tolerated the nilotinib without side effects. [FreeTextEntry1] : nilotinib 150 mg tablets PO 2 tablet in AM, 1 tablet in PM [de-identified] : feels well; she is fully vaccinated against COV 2 and has had booster. No symptoms of infection. She is residing at home

## 2022-01-16 NOTE — ASSESSMENT
[Palliative Care Plan] : not applicable at this time [FreeTextEntry1] : HANG Al is a 76 year old female with a 98 month history of diagnosis and treatment of chronic myeloid leukemia; she is currently receiving treatment with nilotinib and she is tolerating therapy well. \par \par She remains in a hematologic complete response and a very good molecular response. No fatigue no weakness and no rise in platelet count. No recent history of bleeding or thrombosis and she has received vaccination against COV 2 without thrombotic tendency underscoring the safety of vaccination.\par \par Her baseline functional status is without change. She has had fatigue and intermittent leg cramping and RLE swelling and poorly controlled BP.  Her PE is significant for mild ankle swelling on the right. 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  \par Plan: \par Venous Doppler bilateral LE negative\par Hypokalemia after holding spironolactone in October. Now clinically stable. She has not followed up with Dr Jay this January. \par CBC and BCR ABL requested 2/2/2022 as her daughter is driving into office for her appointment\par RTC 3 months PRE V

## 2022-02-04 ENCOUNTER — RESULT REVIEW (OUTPATIENT)
Age: 77
End: 2022-02-04

## 2022-02-04 ENCOUNTER — APPOINTMENT (OUTPATIENT)
Dept: HEMATOLOGY ONCOLOGY | Facility: CLINIC | Age: 77
End: 2022-02-04

## 2022-02-04 LAB
BASOPHILS # BLD AUTO: 0.04 K/UL — SIGNIFICANT CHANGE UP (ref 0–0.2)
BASOPHILS NFR BLD AUTO: 0.7 % — SIGNIFICANT CHANGE UP (ref 0–2)
EOSINOPHIL # BLD AUTO: 0.09 K/UL — SIGNIFICANT CHANGE UP (ref 0–0.5)
EOSINOPHIL NFR BLD AUTO: 1.6 % — SIGNIFICANT CHANGE UP (ref 0–6)
HCT VFR BLD CALC: 43.5 % — SIGNIFICANT CHANGE UP (ref 34.5–45)
HGB BLD-MCNC: 13.9 G/DL — SIGNIFICANT CHANGE UP (ref 11.5–15.5)
IMM GRANULOCYTES NFR BLD AUTO: 0.2 % — SIGNIFICANT CHANGE UP (ref 0–1.5)
LYMPHOCYTES # BLD AUTO: 2.13 K/UL — SIGNIFICANT CHANGE UP (ref 1–3.3)
LYMPHOCYTES # BLD AUTO: 37.8 % — SIGNIFICANT CHANGE UP (ref 13–44)
MCHC RBC-ENTMCNC: 28.4 PG — SIGNIFICANT CHANGE UP (ref 27–34)
MCHC RBC-ENTMCNC: 32 G/DL — SIGNIFICANT CHANGE UP (ref 32–36)
MCV RBC AUTO: 89 FL — SIGNIFICANT CHANGE UP (ref 80–100)
MONOCYTES # BLD AUTO: 0.43 K/UL — SIGNIFICANT CHANGE UP (ref 0–0.9)
MONOCYTES NFR BLD AUTO: 7.6 % — SIGNIFICANT CHANGE UP (ref 2–14)
NEUTROPHILS # BLD AUTO: 2.94 K/UL — SIGNIFICANT CHANGE UP (ref 1.8–7.4)
NEUTROPHILS NFR BLD AUTO: 52.1 % — SIGNIFICANT CHANGE UP (ref 43–77)
NRBC # BLD: 0 /100 WBCS — SIGNIFICANT CHANGE UP (ref 0–0)
PLATELET # BLD AUTO: 213 K/UL — SIGNIFICANT CHANGE UP (ref 150–400)
RBC # BLD: 4.89 M/UL — SIGNIFICANT CHANGE UP (ref 3.8–5.2)
RBC # FLD: 14.4 % — SIGNIFICANT CHANGE UP (ref 10.3–14.5)
WBC # BLD: 5.64 K/UL — SIGNIFICANT CHANGE UP (ref 3.8–10.5)
WBC # FLD AUTO: 5.64 K/UL — SIGNIFICANT CHANGE UP (ref 3.8–10.5)

## 2022-02-10 LAB — T(9;22)(ABL1,BCR)/CONTROL BLD/T: NORMAL

## 2022-02-20 ENCOUNTER — RESULT CHARGE (OUTPATIENT)
Age: 77
End: 2022-02-20

## 2022-02-22 ENCOUNTER — NON-APPOINTMENT (OUTPATIENT)
Age: 77
End: 2022-02-22

## 2022-02-22 ENCOUNTER — APPOINTMENT (OUTPATIENT)
Dept: INTERNAL MEDICINE | Facility: CLINIC | Age: 77
End: 2022-02-22
Payer: MEDICARE

## 2022-02-22 VITALS — SYSTOLIC BLOOD PRESSURE: 160 MMHG | DIASTOLIC BLOOD PRESSURE: 85 MMHG

## 2022-02-22 VITALS
WEIGHT: 172 LBS | HEART RATE: 88 BPM | BODY MASS INDEX: 31.65 KG/M2 | SYSTOLIC BLOOD PRESSURE: 176 MMHG | HEIGHT: 62 IN | OXYGEN SATURATION: 98 % | DIASTOLIC BLOOD PRESSURE: 94 MMHG

## 2022-02-22 DIAGNOSIS — Z86.2 PERSONAL HISTORY OF DISEASES OF THE BLOOD AND BLOOD-FORMING ORGANS AND CERTAIN DISORDERS INVOLVING THE IMMUNE MECHANISM: ICD-10-CM

## 2022-02-22 DIAGNOSIS — R14.0 ABDOMINAL DISTENSION (GASEOUS): ICD-10-CM

## 2022-02-22 PROCEDURE — 90732 PPSV23 VACC 2 YRS+ SUBQ/IM: CPT

## 2022-02-22 PROCEDURE — G0008: CPT

## 2022-02-22 PROCEDURE — G0439: CPT

## 2022-02-22 PROCEDURE — G0444 DEPRESSION SCREEN ANNUAL: CPT

## 2022-02-22 PROCEDURE — G0442 ANNUAL ALCOHOL SCREEN 15 MIN: CPT

## 2022-02-22 PROCEDURE — 90662 IIV NO PRSV INCREASED AG IM: CPT

## 2022-02-22 PROCEDURE — G0009: CPT

## 2022-02-28 NOTE — PHYSICAL EXAM
[No Acute Distress] : no acute distress [Well-Appearing] : well-appearing [Normal Sclera/Conjunctiva] : normal sclera/conjunctiva [PERRL] : pupils equal round and reactive to light [EOMI] : extraocular movements intact [No Lymphadenopathy] : no lymphadenopathy [No Respiratory Distress] : no respiratory distress  [Clear to Auscultation] : lungs were clear to auscultation bilaterally [Normal Rate] : normal rate  [Regular Rhythm] : with a regular rhythm [Normal S1, S2] : normal S1 and S2 [Pedal Pulses Present] : the pedal pulses are present [No Edema] : there was no peripheral edema [Normal Appearance] : normal in appearance [No Masses] : no palpable masses [No Axillary Lymphadenopathy] : no axillary lymphadenopathy [Non Tender] : non-tender [Soft] : abdomen soft [Normal Bowel Sounds] : normal bowel sounds [Normal Axillary Nodes] : no axillary lymphadenopathy [Normal Anterior Cervical Nodes] : no anterior cervical lymphadenopathy [No CVA Tenderness] : no CVA  tenderness [No Spinal Tenderness] : no spinal tenderness [No Joint Swelling] : no joint swelling [Grossly Normal Strength/Tone] : grossly normal strength/tone [No Rash] : no rash [Normal Gait] : normal gait [Normal Affect] : the affect was normal

## 2022-02-28 NOTE — HEALTH RISK ASSESSMENT
[No Retinopathy] : No retinopathy [Patient reported mammogram was normal] : Patient reported mammogram was normal [Patient reported bone density results were normal] : Patient reported bone density results were normal [Never] : Never [2 - 4 times a month (2 pts)] : 2-4 times a month (2 points) [1 or 2 (0 pts)] : 1 or 2 (0 points) [Never (0 pts)] : Never (0 points) [No falls in past year] : Patient reported no falls in the past year [0] : 2) Feeling down, depressed, or hopeless: Not at all (0) [PHQ-2 Negative - No further assessment needed] : PHQ-2 Negative - No further assessment needed [Patient declined PAP Smear] : Patient declined PAP Smear [Patient declined colonoscopy] : Patient declined colonoscopy [HIV test declined] : HIV test declined [Hepatitis C test declined] : Hepatitis C test declined [None] : None [With Family] : lives with family [Retired] : retired [] :  [Feels Safe at Home] : Feels safe at home [Fully functional (bathing, dressing, toileting, transferring, walking, feeding)] : Fully functional (bathing, dressing, toileting, transferring, walking, feeding) [Fully functional (using the telephone, shopping, preparing meals, housekeeping, doing laundry, using] : Fully functional and needs no help or supervision to perform IADLs (using the telephone, shopping, preparing meals, housekeeping, doing laundry, using transportation, managing medications and managing finances) [Smoke Detector] : smoke detector [Carbon Monoxide Detector] : carbon monoxide detector [Seat Belt] :  uses seat belt [Designated Healthcare Proxy] : Designated healthcare proxy [Name: ___] : Health Care Proxy's Name: [unfilled]  [Relationship: ___] : Relationship: [unfilled] [de-identified] : housework - no other exercise [de-identified] : varied [EyeExamDate] : 09/21 [Change in mental status noted] : No change in mental status noted [Reports changes in hearing] : Reports no changes in hearing [Reports changes in vision] : Reports no changes in vision [BoneDensityDate] : 01/21 [MammogramDate] : 01/21 [AdvancecareDate] : 02/22

## 2022-02-28 NOTE — DISCUSSION/SUMMARY
[Subsequent Annual Wellness] : Subsequent Annual Wellness Visit [Preventive Exam & Counseling Completed] : with preventive exam as well as age and risk appropriate counseling completed [EKG] : EKG recommended [Healthy Diet] : counseling was given on maintaining a healthy diet [Blood Glucose] : due for blood glucose [Screening Mammogram] : due for a screening mammogram [Calcium and Vitamin D Intake] : counseling was given on obtaining adequate amounts of calcium and vitamin D on a daily basis [Screening Not Indicated] : screening not indicated [Screening Current] : screening is current [Patient Declines] : the patient declines screening [de-identified] : along with breast US

## 2022-02-28 NOTE — HISTORY OF PRESENT ILLNESS
[de-identified] : got  3 shots of pfizer - 1/6/21, 2/5/21, 11/23/21\par about a week after the booster, got a itchy rash over multiple areas of her body - arms, abdomen, thighs.  lasted a month and now resolved.

## 2022-03-06 LAB
GLUCOSE BLDC GLUCOMTR-MCNC: 97
HBA1C MFR BLD HPLC: 5.8

## 2022-03-24 ENCOUNTER — APPOINTMENT (OUTPATIENT)
Dept: OPHTHALMOLOGY | Facility: CLINIC | Age: 77
End: 2022-03-24
Payer: MEDICARE

## 2022-03-24 ENCOUNTER — NON-APPOINTMENT (OUTPATIENT)
Age: 77
End: 2022-03-24

## 2022-03-24 PROCEDURE — 92012 INTRM OPH EXAM EST PATIENT: CPT

## 2022-03-24 PROCEDURE — 92083 EXTENDED VISUAL FIELD XM: CPT

## 2022-03-24 PROCEDURE — 92133 CPTRZD OPH DX IMG PST SGM ON: CPT

## 2022-04-18 ENCOUNTER — RX RENEWAL (OUTPATIENT)
Age: 77
End: 2022-04-18

## 2022-05-24 ENCOUNTER — APPOINTMENT (OUTPATIENT)
Dept: INTERNAL MEDICINE | Facility: CLINIC | Age: 77
End: 2022-05-24
Payer: MEDICARE

## 2022-05-24 VITALS
WEIGHT: 159 LBS | HEIGHT: 62 IN | HEART RATE: 87 BPM | SYSTOLIC BLOOD PRESSURE: 154 MMHG | BODY MASS INDEX: 29.26 KG/M2 | OXYGEN SATURATION: 97 % | DIASTOLIC BLOOD PRESSURE: 90 MMHG

## 2022-05-24 VITALS — SYSTOLIC BLOOD PRESSURE: 138 MMHG | DIASTOLIC BLOOD PRESSURE: 82 MMHG

## 2022-05-24 LAB
GLUCOSE BLDC GLUCOMTR-MCNC: 104
HBA1C MFR BLD HPLC: 5.8

## 2022-05-24 PROCEDURE — 83036 HEMOGLOBIN GLYCOSYLATED A1C: CPT | Mod: QW

## 2022-05-24 PROCEDURE — 82962 GLUCOSE BLOOD TEST: CPT

## 2022-05-24 PROCEDURE — 99214 OFFICE O/P EST MOD 30 MIN: CPT | Mod: 25

## 2022-05-24 NOTE — ASSESSMENT
[FreeTextEntry1] : RE HTN: mildly elevated today however she has not taken spironolactone in the past week. Rx sent for renewal.  Also not taking carvedilol and for now she does not intend to start it. \par RE CML: follows regularly with Dr Orellana\par RE Hepatic adenomatosis: most recent US shows coarse  texture but no discrete lesions. She has rx to repeat. \par RE HLD: briefly took the rosuvastatin and had a terrific response however stopped again and declines restarting for now. \par RE knee pain: referring to ortho for further eval. \par RE preDM: todays A1c 5.8\par

## 2022-05-24 NOTE — HISTORY OF PRESENT ILLNESS
[FreeTextEntry1] : f/u of HTN, HLD,  [de-identified] : 75 yo F with h/o HTN, HLD (declines med in past but now on rosuvastatin)), CML (dx 2012)\par \par recently seen by cardiology and started on rosuvastatin and carvedilol. \par Reports she has recently changed her diet - less rice, less pasta, no meat - lots of vegetable juicing - has made these changes along with her daughter. has lost about 12 lbs. since dietary modification.\par Also reports she has not had a chance yet to schedule her mammo and US's.\par And also - has been having left knee pain. started about 3 weeks ago. had some lateral swelling as well but slowly improving. nevertheless, she would like further evaluation.   \par \par RE HTN: Currently on amlodipine  and spironolactone. Says she is not taking the carvedilol and ran out of spironolactone (needs refill) about a week ago -therefore has only taken amlodipine today. \par Brings in home BP log - reviewed together and most in target range.  (see data section of note) \par RE CML: follows with Dr. Esteban Soriano\par RE HLD: reports she is not actually taking the statin.  \par RE hepatic hemangioma: due to repeat Abd US - rx given last visit\par PreDM: last A1c 5.8 in 3/22\par using iceberg lettace tea for sleep - really helps.

## 2022-05-24 NOTE — PHYSICAL EXAM
[No Acute Distress] : no acute distress [Well-Appearing] : well-appearing [EOMI] : extraocular movements intact [No Respiratory Distress] : no respiratory distress  [Clear to Auscultation] : lungs were clear to auscultation bilaterally [Normal Rate] : normal rate  [Regular Rhythm] : with a regular rhythm [Normal S1, S2] : normal S1 and S2 [No Edema] : there was no peripheral edema [Soft] : abdomen soft [Non Tender] : non-tender [Normal Anterior Cervical Nodes] : no anterior cervical lymphadenopathy [No CVA Tenderness] : no CVA  tenderness [No Spinal Tenderness] : no spinal tenderness [Grossly Normal Strength/Tone] : grossly normal strength/tone [Coordination Grossly Intact] : coordination grossly intact [No Focal Deficits] : no focal deficits [Normal Affect] : the affect was normal [de-identified] : FROM at Left knee, no crepitus

## 2022-05-27 LAB
ALBUMIN SERPL ELPH-MCNC: 4.5 G/DL
ALP BLD-CCNC: 50 U/L
ALT SERPL-CCNC: 14 U/L
ANION GAP SERPL CALC-SCNC: 12 MMOL/L
AST SERPL-CCNC: 19 U/L
BILIRUB SERPL-MCNC: 0.7 MG/DL
BUN SERPL-MCNC: 10 MG/DL
CALCIUM SERPL-MCNC: 9.8 MG/DL
CHLORIDE SERPL-SCNC: 104 MMOL/L
CHOLEST SERPL-MCNC: 213 MG/DL
CO2 SERPL-SCNC: 28 MMOL/L
CREAT SERPL-MCNC: 0.79 MG/DL
EGFR: 77 ML/MIN/1.73M2
GLUCOSE SERPL-MCNC: 92 MG/DL
HDLC SERPL-MCNC: 60 MG/DL
LDLC SERPL CALC-MCNC: 134 MG/DL
NONHDLC SERPL-MCNC: 154 MG/DL
POTASSIUM SERPL-SCNC: 4.1 MMOL/L
PROT SERPL-MCNC: 7.3 G/DL
SODIUM SERPL-SCNC: 144 MMOL/L
TRIGL SERPL-MCNC: 100 MG/DL

## 2022-06-01 ENCOUNTER — OUTPATIENT (OUTPATIENT)
Dept: OUTPATIENT SERVICES | Facility: HOSPITAL | Age: 77
LOS: 1 days | Discharge: HOME | End: 2022-06-01

## 2022-06-01 DIAGNOSIS — C93.10 CHRONIC MYELOMONOCYTIC LEUKEMIA NOT HAVING ACHIEVED REMISSION: ICD-10-CM

## 2022-06-01 DIAGNOSIS — Z98.41 CATARACT EXTRACTION STATUS, RIGHT EYE: Chronic | ICD-10-CM

## 2022-06-08 ENCOUNTER — APPOINTMENT (OUTPATIENT)
Dept: ORTHOPEDIC SURGERY | Facility: CLINIC | Age: 77
End: 2022-06-08
Payer: MEDICARE

## 2022-06-08 ENCOUNTER — RESULT REVIEW (OUTPATIENT)
Age: 77
End: 2022-06-08

## 2022-06-08 ENCOUNTER — APPOINTMENT (OUTPATIENT)
Dept: HEMATOLOGY ONCOLOGY | Facility: CLINIC | Age: 77
End: 2022-06-08
Payer: MEDICARE

## 2022-06-08 VITALS — DIASTOLIC BLOOD PRESSURE: 83 MMHG | OXYGEN SATURATION: 97 % | HEART RATE: 83 BPM | SYSTOLIC BLOOD PRESSURE: 152 MMHG

## 2022-06-08 VITALS
HEART RATE: 76 BPM | TEMPERATURE: 97 F | RESPIRATION RATE: 16 BRPM | BODY MASS INDEX: 29.03 KG/M2 | DIASTOLIC BLOOD PRESSURE: 91 MMHG | OXYGEN SATURATION: 97 % | WEIGHT: 158.73 LBS | SYSTOLIC BLOOD PRESSURE: 157 MMHG

## 2022-06-08 DIAGNOSIS — M25.562 PAIN IN LEFT KNEE: ICD-10-CM

## 2022-06-08 DIAGNOSIS — I10 ESSENTIAL (PRIMARY) HYPERTENSION: ICD-10-CM

## 2022-06-08 LAB
BASOPHILS # BLD AUTO: 0.07 K/UL — SIGNIFICANT CHANGE UP (ref 0–0.2)
BASOPHILS NFR BLD AUTO: 1.2 % — SIGNIFICANT CHANGE UP (ref 0–2)
EOSINOPHIL # BLD AUTO: 0.1 K/UL — SIGNIFICANT CHANGE UP (ref 0–0.5)
EOSINOPHIL NFR BLD AUTO: 1.7 % — SIGNIFICANT CHANGE UP (ref 0–6)
HCT VFR BLD CALC: 40.2 % — SIGNIFICANT CHANGE UP (ref 34.5–45)
HGB BLD-MCNC: 12.8 G/DL — SIGNIFICANT CHANGE UP (ref 11.5–15.5)
IMM GRANULOCYTES NFR BLD AUTO: 0.2 % — SIGNIFICANT CHANGE UP (ref 0–1.5)
LYMPHOCYTES # BLD AUTO: 1.85 K/UL — SIGNIFICANT CHANGE UP (ref 1–3.3)
LYMPHOCYTES # BLD AUTO: 31.6 % — SIGNIFICANT CHANGE UP (ref 13–44)
MCHC RBC-ENTMCNC: 28.3 PG — SIGNIFICANT CHANGE UP (ref 27–34)
MCHC RBC-ENTMCNC: 31.8 G/DL — LOW (ref 32–36)
MCV RBC AUTO: 88.9 FL — SIGNIFICANT CHANGE UP (ref 80–100)
MONOCYTES # BLD AUTO: 0.53 K/UL — SIGNIFICANT CHANGE UP (ref 0–0.9)
MONOCYTES NFR BLD AUTO: 9 % — SIGNIFICANT CHANGE UP (ref 2–14)
NEUTROPHILS # BLD AUTO: 3.3 K/UL — SIGNIFICANT CHANGE UP (ref 1.8–7.4)
NEUTROPHILS NFR BLD AUTO: 56.3 % — SIGNIFICANT CHANGE UP (ref 43–77)
NRBC # BLD: 0 /100 WBCS — SIGNIFICANT CHANGE UP (ref 0–0)
PLATELET # BLD AUTO: 271 K/UL — SIGNIFICANT CHANGE UP (ref 150–400)
RBC # BLD: 4.52 M/UL — SIGNIFICANT CHANGE UP (ref 3.8–5.2)
RBC # FLD: 14.5 % — SIGNIFICANT CHANGE UP (ref 10.3–14.5)
WBC # BLD: 5.86 K/UL — SIGNIFICANT CHANGE UP (ref 3.8–10.5)
WBC # FLD AUTO: 5.86 K/UL — SIGNIFICANT CHANGE UP (ref 3.8–10.5)

## 2022-06-08 PROCEDURE — 99204 OFFICE O/P NEW MOD 45 MIN: CPT

## 2022-06-08 PROCEDURE — 73564 X-RAY EXAM KNEE 4 OR MORE: CPT | Mod: LT

## 2022-06-08 PROCEDURE — 99214 OFFICE O/P EST MOD 30 MIN: CPT

## 2022-06-09 LAB
ALBUMIN SERPL ELPH-MCNC: 4.5 G/DL
ALP BLD-CCNC: 52 U/L
ALT SERPL-CCNC: 13 U/L
ANION GAP SERPL CALC-SCNC: 14 MMOL/L
AST SERPL-CCNC: 17 U/L
BILIRUB SERPL-MCNC: 1.2 MG/DL
BUN SERPL-MCNC: 7 MG/DL
CALCIUM SERPL-MCNC: 9.5 MG/DL
CHLORIDE SERPL-SCNC: 101 MMOL/L
CO2 SERPL-SCNC: 26 MMOL/L
CREAT SERPL-MCNC: 0.72 MG/DL
EGFR: 86 ML/MIN/1.73M2
GLUCOSE SERPL-MCNC: 89 MG/DL
POTASSIUM SERPL-SCNC: 3.5 MMOL/L
PROT SERPL-MCNC: 7.3 G/DL
SODIUM SERPL-SCNC: 141 MMOL/L

## 2022-06-10 PROBLEM — M25.562 LEFT KNEE PAIN, UNSPECIFIED CHRONICITY: Status: ACTIVE | Noted: 2022-05-24

## 2022-06-10 NOTE — ASSESSMENT
[Palliative Care Plan] : not applicable at this time [FreeTextEntry1] : HANG Al is a 76 year old female with a 98 month history of diagnosis and treatment of chronic myeloid leukemia; she is currently receiving treatment with nilotinib and she is tolerating therapy well. \par \par She remains in a hematologic complete response and a very good molecular response. No fatigue no weakness and no rise in platelet count. No recent history of bleeding or thrombosis and she has received vaccination against COV 2 without thrombotic tendency underscoring the safety of vaccination.\par \par Her baseline functional status is without change. She has had fatigue and intermittent leg cramping and RLE swelling and poorly controlled BP.  Her PE is significant for mild ankle swelling on the right. 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  \par Plan: \par Venous Doppler bilateral LE negative\par Hypokalemia after holding spironolactone in October. Now clinically stable. She has not followed up with Dr Jay this January. \par CBC and BCR ABL requested 2/2/2022 as her daughter is driving into office for her appointment\par RTC 3 months PRE V\par \par 06/08/2022 - Patient feels well. Her blood pressure was high today - she did not take her am blood pressure medications as she takes it with food and did not eat breakfast as she was getting her blood work done. \par Patient advised to take her blood pressure medication as prescribed and advised not to skip doses. \par Will repeat CBC, CMP and BCR/ ABL levels today. \par Her Chronic Myeloid Leukemia is well controlled on current therapy, will continue Nilotinib 150 mg PO capsules - 2 capsules in am & 1 in pm. \par Medication & Physician follow up compliance emphasized. Patient verbalized understanding, all questions, concerns were addressed during this visit.\par Patient was seen, examined and evaluated with Dr Rios Orellana.

## 2022-06-10 NOTE — REVIEW OF SYSTEMS
[Negative] : Allergic/Immunologic [Incontinence] : incontinence [Joint Pain] : joint pain [Insomnia] : insomnia [Fever] : no fever [Chills] : no chills [Night Sweats] : no night sweats [Fatigue] : no fatigue [Recent Change In Weight] : ~T no recent weight change [Eye Pain] : no eye pain [Red Eyes] : eyes not red [Dry Eyes] : no dryness of the eyes [Vision Problems] : no vision problems [Dysphagia] : no dysphagia [Loss of Hearing] : no loss of hearing [Nosebleeds] : no nosebleeds [Hoarseness] : no hoarseness [Odynophagia] : no odynophagia [Mucosal Pain] : no mucosal pain [Chest Pain] : no chest pain [Palpitations] : no palpitations [Leg Claudication] : no intermittent leg claudication [Lower Ext Edema] : no lower extremity edema [Shortness Of Breath] : no shortness of breath [Wheezing] : no wheezing [Cough] : no cough [SOB on Exertion] : no shortness of breath during exertion [Abdominal Pain] : no abdominal pain [Vomiting] : no vomiting [Constipation] : no constipation [Diarrhea] : no diarrhea [Dysuria] : no dysuria [Vaginal Discharge] : no vaginal discharge [Dysmenorrhea/Abn Vaginal Bleeding] : no dysmenorrhea/abnormal vaginal bleeding [Joint Stiffness] : no joint stiffness [Skin Rash] : no skin rash [Skin Wound] : no skin wound [Confused] : no confusion [Dizziness] : no dizziness [Fainting] : no fainting [Difficulty Walking] : no difficulty walking [Suicidal] : not suicidal [Anxiety] : no anxiety [Depression] : no depression [Proptosis] : no proptosis [Hot Flashes] : no hot flashes [Muscle Weakness] : no muscle weakness [Deepening Of The Voice] : no deepening of the voice [Easy Bleeding] : no tendency for easy bleeding [Easy Bruising] : no tendency for easy bruising [Swollen Glands] : no swollen glands [FreeTextEntry3] : wears corrective lenses, has Glaucoma uses eye drops per Ophtho recommendations  [FreeTextEntry8] : wears pull ups for incontinence, denies hematuria, urgency, frequency, denies any vaginal bleeding [FreeTextEntry9] : right ankle, foot pain unrelated to trauma, Left knee pain  [de-identified] : intermittent insomnia doesn't take any medications

## 2022-06-10 NOTE — PHYSICAL EXAM
[Fully active, able to carry on all pre-disease performance without restriction] : Status 0 - Fully active, able to carry on all pre-disease performance without restriction [Normal] : affect appropriate [Ulcers] : no ulcers [Mucositis] : no mucositis [Thrush] : no thrush [Vesicles] : no vesicles [de-identified] : wears corrective lenses  [de-identified] : Right ankle swelling, no visible signs of trauma noted. no pitting edema  [de-identified] : Generalized dry skin noted  [de-identified] : no gait instability, no gross sensory / motor deficits noted

## 2022-06-10 NOTE — HISTORY OF PRESENT ILLNESS
[Disease:__________________________] : Disease: [unfilled] [Treatment Protocol] : Treatment Protocol [Date: ____________] : Patient's last distress assessment performed on [unfilled]. [0 - No Distress] : Distress Level: 0 [Home] : at home, [unfilled] , at the time of the visit. [Medical Office: ([facility])___] : at the medical office located in  [100: Normal, no complaints, no evidence of disease.] : 100: Normal, no complaints, no evidence of disease. [ECOG Performance Status: 0 - Fully active, able to carry on all pre-disease performance without restriction] : Performance Status: 0 - Fully active, able to carry on all pre-disease performance without restriction [de-identified] : 76 year old female with past medical history of hypertension presenting to [facility] for hematologic care. Patient was initially diagnosed with CML in April 2012. She was admitted to [facility] and was seen by Dr Bland of the transfusion service who performed several pheresis procedures to reduce a high platelet count (approximately 3,000,000). She was subsequently started on treatment with imatinib 400 mg PO daily and she was in compliance with treatment for three years. She discontinued treatment on her volition in early 2015. \par \par On March 2015, she was reportedly feeling more tired with anorexia weight loss and presented at PMD with thrombocytosis (2.1 million platelet count). She was admitted with platelet count of 2,088K and underwent multiple cycles of plasmaphereses with improvement of thrombocytosis and begun on nilotinib and hydroxyurea. She stated at the time of admission that she stopped taking Gleevec as well as other antihypertensives as she felt she did not need them. Her hospital course was complicated by herpes zoster with resolution on Valtrex as well as APC's which resolved with metoprolol. She was ultimately discharged on 4/17/2015. She has tolerated the nilotinib without side effects. [FreeTextEntry1] : nilotinib 150 mg tablets PO 2 tablet in AM, 1 tablet in PM [de-identified] : feels well; she is fully vaccinated against COV 2 and has had booster. No symptoms of infection. She is residing at home\par \par 06/08/2022 - Patient was seen in a follow up visit accompanied by her granddaughter Shellie. \par Patient feels well denies any weight loss, fever, chills, nausea, vomiting, headaches, visual disturbances, shortness of breath, abdominal pain, constipation, diarrhea, bleeding, bruising. Since her last visit with us she denies any COVID infection, hospitalization or surgeries. She has received Pfizer vaccination for COVID-19 including booster doses. \par She was having chronic Right foot and ankle pain and Left knee pain was seen by Orthopedics today, reports no fractures will be treated medically. \par She denies any falls, gait instability, use of any assist devices to ambulate. \par Today on routine vitals in our office her BP was high 157/ 91 mmHg pt asymptomatic. States she takes her medications with breakfast and this morning did not eat or take any medications as she had an appointment for her blood work. \par She has been compliant with her medications and her physician follow ups. \par She lives with her  and other family members. She is able to carry out her household tasks cooking, cleaning without any difficulties.

## 2022-06-10 NOTE — RESULTS/DATA
[FreeTextEntry1] : review of blood testing from 10/2021; normal CBC \par BCR/ABL less than 0.40 log\par \par 06/08/2022 - CBC - WBC = 5.86 Hgb 12.8  HCT = 40.2  PLT [id number], MCV; normal differential WBC\par

## 2022-06-10 NOTE — REASON FOR VISIT
[Follow-Up Visit] : a follow-up visit for [Chronic Leukemia] : chronic leukemia [Family Member] : family member [Other: _____] : [unfilled] [FreeTextEntry2] : 6 month evaluation for chronic myelogenous leukemia

## 2022-06-14 LAB — T(9;22)(ABL1,BCR)/CONTROL BLD/T: NORMAL

## 2022-06-23 ENCOUNTER — APPOINTMENT (OUTPATIENT)
Dept: ULTRASOUND IMAGING | Facility: IMAGING CENTER | Age: 77
End: 2022-06-23
Payer: MEDICARE

## 2022-06-23 ENCOUNTER — RESULT REVIEW (OUTPATIENT)
Age: 77
End: 2022-06-23

## 2022-06-23 ENCOUNTER — OUTPATIENT (OUTPATIENT)
Dept: OUTPATIENT SERVICES | Facility: HOSPITAL | Age: 77
LOS: 1 days | End: 2022-06-23
Payer: MEDICARE

## 2022-06-23 ENCOUNTER — APPOINTMENT (OUTPATIENT)
Dept: MAMMOGRAPHY | Facility: IMAGING CENTER | Age: 77
End: 2022-06-23
Payer: MEDICARE

## 2022-06-23 DIAGNOSIS — Z00.8 ENCOUNTER FOR OTHER GENERAL EXAMINATION: ICD-10-CM

## 2022-06-23 DIAGNOSIS — Z12.39 ENCOUNTER FOR OTHER SCREENING FOR MALIGNANT NEOPLASM OF BREAST: ICD-10-CM

## 2022-06-23 DIAGNOSIS — R14.0 ABDOMINAL DISTENSION (GASEOUS): ICD-10-CM

## 2022-06-23 DIAGNOSIS — D13.4 BENIGN NEOPLASM OF LIVER: ICD-10-CM

## 2022-06-23 DIAGNOSIS — R92.2 INCONCLUSIVE MAMMOGRAM: ICD-10-CM

## 2022-06-23 DIAGNOSIS — Z98.41 CATARACT EXTRACTION STATUS, RIGHT EYE: Chronic | ICD-10-CM

## 2022-06-23 PROCEDURE — 77063 BREAST TOMOSYNTHESIS BI: CPT

## 2022-06-23 PROCEDURE — 76641 ULTRASOUND BREAST COMPLETE: CPT | Mod: 26,50

## 2022-06-23 PROCEDURE — 76641 ULTRASOUND BREAST COMPLETE: CPT

## 2022-06-23 PROCEDURE — 76700 US EXAM ABDOM COMPLETE: CPT | Mod: 26

## 2022-06-23 PROCEDURE — 77063 BREAST TOMOSYNTHESIS BI: CPT | Mod: 26

## 2022-06-23 PROCEDURE — 77067 SCR MAMMO BI INCL CAD: CPT | Mod: 26

## 2022-06-23 PROCEDURE — 76700 US EXAM ABDOM COMPLETE: CPT

## 2022-06-23 PROCEDURE — 77067 SCR MAMMO BI INCL CAD: CPT

## 2022-06-27 ENCOUNTER — NON-APPOINTMENT (OUTPATIENT)
Age: 77
End: 2022-06-27

## 2022-07-20 ENCOUNTER — APPOINTMENT (OUTPATIENT)
Dept: HEMATOLOGY ONCOLOGY | Facility: CLINIC | Age: 77
End: 2022-07-20

## 2022-07-20 ENCOUNTER — RESULT REVIEW (OUTPATIENT)
Age: 77
End: 2022-07-20

## 2022-07-20 VITALS
OXYGEN SATURATION: 96 % | HEIGHT: 62.13 IN | TEMPERATURE: 97.9 F | RESPIRATION RATE: 16 BRPM | HEART RATE: 97 BPM | SYSTOLIC BLOOD PRESSURE: 175 MMHG | BODY MASS INDEX: 28.72 KG/M2 | WEIGHT: 158.07 LBS | DIASTOLIC BLOOD PRESSURE: 99 MMHG

## 2022-07-20 LAB
BASOPHILS # BLD AUTO: 0.1 K/UL — SIGNIFICANT CHANGE UP (ref 0–0.2)
BASOPHILS NFR BLD AUTO: 1.7 % — SIGNIFICANT CHANGE UP (ref 0–2)
EOSINOPHIL # BLD AUTO: 0.03 K/UL — SIGNIFICANT CHANGE UP (ref 0–0.5)
EOSINOPHIL NFR BLD AUTO: 0.5 % — SIGNIFICANT CHANGE UP (ref 0–6)
HCT VFR BLD CALC: 39.1 % — SIGNIFICANT CHANGE UP (ref 34.5–45)
HGB BLD-MCNC: 12.7 G/DL — SIGNIFICANT CHANGE UP (ref 11.5–15.5)
IMM GRANULOCYTES NFR BLD AUTO: 0.2 % — SIGNIFICANT CHANGE UP (ref 0–1.5)
LYMPHOCYTES # BLD AUTO: 2.13 K/UL — SIGNIFICANT CHANGE UP (ref 1–3.3)
LYMPHOCYTES # BLD AUTO: 36.5 % — SIGNIFICANT CHANGE UP (ref 13–44)
MCHC RBC-ENTMCNC: 28.6 PG — SIGNIFICANT CHANGE UP (ref 27–34)
MCHC RBC-ENTMCNC: 32.5 G/DL — SIGNIFICANT CHANGE UP (ref 32–36)
MCV RBC AUTO: 88.1 FL — SIGNIFICANT CHANGE UP (ref 80–100)
MONOCYTES # BLD AUTO: 0.55 K/UL — SIGNIFICANT CHANGE UP (ref 0–0.9)
MONOCYTES NFR BLD AUTO: 9.4 % — SIGNIFICANT CHANGE UP (ref 2–14)
NEUTROPHILS # BLD AUTO: 3.01 K/UL — SIGNIFICANT CHANGE UP (ref 1.8–7.4)
NEUTROPHILS NFR BLD AUTO: 51.7 % — SIGNIFICANT CHANGE UP (ref 43–77)
NRBC # BLD: 0 /100 WBCS — SIGNIFICANT CHANGE UP (ref 0–0)
PLATELET # BLD AUTO: 299 K/UL — SIGNIFICANT CHANGE UP (ref 150–400)
RBC # BLD: 4.44 M/UL — SIGNIFICANT CHANGE UP (ref 3.8–5.2)
RBC # FLD: 15.3 % — HIGH (ref 10.3–14.5)
WBC # BLD: 5.83 K/UL — SIGNIFICANT CHANGE UP (ref 3.8–10.5)
WBC # FLD AUTO: 5.83 K/UL — SIGNIFICANT CHANGE UP (ref 3.8–10.5)

## 2022-07-20 PROCEDURE — 99214 OFFICE O/P EST MOD 30 MIN: CPT

## 2022-07-20 NOTE — REVIEW OF SYSTEMS
[Incontinence] : incontinence [Joint Pain] : joint pain [Insomnia] : insomnia [Negative] : Allergic/Immunologic [Fever] : no fever [Chills] : no chills [Night Sweats] : no night sweats [Fatigue] : no fatigue [Recent Change In Weight] : ~T no recent weight change [Eye Pain] : no eye pain [Red Eyes] : eyes not red [Dry Eyes] : no dryness of the eyes [Vision Problems] : no vision problems [Dysphagia] : no dysphagia [Loss of Hearing] : no loss of hearing [Nosebleeds] : no nosebleeds [Hoarseness] : no hoarseness [Odynophagia] : no odynophagia [Mucosal Pain] : no mucosal pain [Chest Pain] : no chest pain [Palpitations] : no palpitations [Leg Claudication] : no intermittent leg claudication [Lower Ext Edema] : no lower extremity edema [Shortness Of Breath] : no shortness of breath [Wheezing] : no wheezing [Cough] : no cough [SOB on Exertion] : no shortness of breath during exertion [Abdominal Pain] : no abdominal pain [Vomiting] : no vomiting [Constipation] : no constipation [Diarrhea] : no diarrhea [Dysuria] : no dysuria [Vaginal Discharge] : no vaginal discharge [Dysmenorrhea/Abn Vaginal Bleeding] : no dysmenorrhea/abnormal vaginal bleeding [Joint Stiffness] : no joint stiffness [Skin Rash] : no skin rash [Skin Wound] : no skin wound [Confused] : no confusion [Dizziness] : no dizziness [Fainting] : no fainting [Difficulty Walking] : no difficulty walking [Suicidal] : not suicidal [Anxiety] : no anxiety [Depression] : no depression [Proptosis] : no proptosis [Hot Flashes] : no hot flashes [Muscle Weakness] : no muscle weakness [Deepening Of The Voice] : no deepening of the voice [Easy Bleeding] : no tendency for easy bleeding [Easy Bruising] : no tendency for easy bruising [Swollen Glands] : no swollen glands [FreeTextEntry3] : wears corrective lenses, has Glaucoma uses eye drops per Ophtho recommendations  [FreeTextEntry8] : wears pull ups for incontinence, denies hematuria, urgency, frequency, denies any vaginal bleeding [FreeTextEntry9] : right ankle, foot pain unrelated to trauma, Left knee pain  [de-identified] : intermittent insomnia doesn't take any medications

## 2022-07-20 NOTE — REASON FOR VISIT
[Follow-Up Visit] : a follow-up visit for [Blood Count Assessment] : blood count assessment [Chronic Leukemia] : chronic leukemia [Family Member] : family member [Other: _____] : [unfilled] [FreeTextEntry2] : 6 month evaluation for chronic myelogenous leukemia

## 2022-07-20 NOTE — PHYSICAL EXAM
[Fully active, able to carry on all pre-disease performance without restriction] : Status 0 - Fully active, able to carry on all pre-disease performance without restriction [Normal] : affect appropriate [Ulcers] : no ulcers [Mucositis] : no mucositis [Thrush] : no thrush [Vesicles] : no vesicles [de-identified] : wears corrective lenses  [de-identified] : Right ankle swelling, no visible signs of trauma noted. no pitting edema  [de-identified] : Generalized dry skin noted  [de-identified] : no gait instability, no gross sensory / motor deficits noted

## 2022-07-20 NOTE — ASSESSMENT
[Palliative Care Plan] : not applicable at this time [Supportive] : Goals of care discussed with patient: Supportive [FreeTextEntry1] : HANG Al is a 76 year old female with a 98 month history of diagnosis and treatment of chronic myeloid leukemia; she is currently receiving treatment with nilotinib and she is tolerating therapy well. \par \par She remains in a hematologic complete response and a very good molecular response. No fatigue no weakness and no rise in platelet count. No recent history of bleeding or thrombosis and she has received vaccination against COV 2 without thrombotic tendency underscoring the safety of vaccination.\par \par Her baseline functional status is without change. She has had fatigue and intermittent leg cramping and RLE swelling and poorly controlled BP.  Her PE is significant for mild ankle swelling on the right. 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  \par Plan: \par Venous Doppler bilateral LE negative\par Hypokalemia after holding spironolactone in October. Now clinically stable. She has not followed up with Dr Jay this January. \par CBC and BCR ABL requested 2/2/2022 as her daughter is driving into office for her appointment\par RTC 3 months PRE V\par \par 06/08/2022 - Patient feels well. Her blood pressure was high today - she did not take her am blood pressure medications as she takes it with food and did not eat breakfast as she was getting her blood work done. \par Patient advised to take her blood pressure medication as prescribed and advised not to skip doses. \par Will repeat CBC, CMP and BCR/ ABL levels today. \par Her Chronic Myeloid Leukemia is well controlled on current therapy, will continue Nilotinib 150 mg PO capsules - 2 capsules in am & 1 in pm. \par Medication & Physician follow up compliance emphasized. Patient verbalized understanding, all questions, concerns were addressed during this visit.\par Patient was seen, examined and evaluated with Dr Rios Orellana.\par 07/20/2022 Discussion with Young; she has been missing dosing of the Tasigna; She has missed as much as one month of medication. At times she forgets or feels tired to take medication now after ten years. One prior relapse after 2 years of treatment (eight years ago). She denies depression and has blanca in caring for her grand daughter. 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 CBC and WBC is OK today.\par It is probable that the rise in BCR ABL reflected non use of medication rather than genetic resistence\par RTC in 6 week BCR ABL testing is requested today.

## 2022-07-20 NOTE — HISTORY OF PRESENT ILLNESS
[Disease:__________________________] : Disease: [unfilled] [Treatment Protocol] : Treatment Protocol [Date: ____________] : Patient's last distress assessment performed on [unfilled]. [0 - No Distress] : Distress Level: 0 [100: Normal, no complaints, no evidence of disease.] : 100: Normal, no complaints, no evidence of disease. [ECOG Performance Status: 0 - Fully active, able to carry on all pre-disease performance without restriction] : Performance Status: 0 - Fully active, able to carry on all pre-disease performance without restriction [de-identified] : 77 year old female with past medical history of hypertension presenting to [facility] for hematologic care. Patient was initially diagnosed with CML in April 2012. She was admitted to [facility] and was seen by Dr Bland of the transfusion service who performed several pheresis procedures to reduce a high platelet count (approximately 3,000,000). She was subsequently started on treatment with imatinib 400 mg PO daily and she was in compliance with treatment for three years. She discontinued treatment on her volition in early 2015. \par \par On March 2015, she was reportedly feeling more tired with anorexia weight loss and presented at PMD with thrombocytosis (2.1 million platelet count). She was admitted with platelet count of 2,088K and underwent multiple cycles of plasmaphereses with improvement of thrombocytosis and begun on nilotinib and hydroxyurea. She stated at the time of admission that she stopped taking Gleevec as well as other antihypertensives as she felt she did not need them. Her hospital course was complicated by herpes zoster with resolution on Valtrex as well as APC's which resolved with metoprolol. She was ultimately discharged on 4/17/2015. She has tolerated the nilotinib without side effects. [FreeTextEntry1] : nilotinib 150 mg tablets PO 2 tablet in AM, 1 tablet in PM [de-identified] : feels well; she is fully vaccinated against COV 2 and has had booster. No symptoms of infection. She is residing at home\par \par 06/08/2022 - Patient was seen in a follow up visit accompanied by her granddaughter Shellie. \par Patient feels well denies any weight loss, fever, chills, nausea, vomiting, headaches, visual disturbances, shortness of breath, abdominal pain, constipation, diarrhea, bleeding, bruising. Since her last visit with us she denies any COVID infection, hospitalization or surgeries. She has received Pfizer vaccination for COVID-19 including booster doses. \par She was having chronic Right foot and ankle pain and Left knee pain was seen by Orthopedics today, reports no fractures will be treated medically. \par She denies any falls, gait instability, use of any assist devices to ambulate. \par Today on routine vitals in our office her BP was high 157/ 91 mmHg pt asymptomatic. States she takes her medications with breakfast and this morning did not eat or take any medications as she had an appointment for her blood work. \par She has been compliant with her medications and her physician follow ups. \par She lives with her  and other family members. She is able to carry out her household tasks cooking, cleaning without any difficulties. \par 07/20/2022 No change from above recent activities and no weight loss and no fever. She tells me that she has been missing dosing of medication doses including one full month of no therapy

## 2022-07-20 NOTE — RESULTS/DATA
[FreeTextEntry1] : review of blood testing from 10/2021; normal CBC \par BCR/ABL less than 0.40 log\par \par 06/08/2022: CBC WBC = 5.86 Hgb 12.8  HCT = 40.2  PLT [id number], MCV; normal differential WBC\par BCR ABL 4% in contrast to 0.004 in 10/21\par 07/20/2022: CBC: WBC 5.83 HGB 12.3 PL [id number]\par

## 2022-07-25 LAB — T(9;22)(ABL1,BCR)/CONTROL BLD/T: NORMAL

## 2022-07-28 ENCOUNTER — NON-APPOINTMENT (OUTPATIENT)
Age: 77
End: 2022-07-28

## 2022-07-28 ENCOUNTER — APPOINTMENT (OUTPATIENT)
Dept: OPHTHALMOLOGY | Facility: CLINIC | Age: 77
End: 2022-07-28

## 2022-07-28 PROCEDURE — 92012 INTRM OPH EXAM EST PATIENT: CPT

## 2022-07-28 PROCEDURE — 92083 EXTENDED VISUAL FIELD XM: CPT

## 2022-08-24 ENCOUNTER — APPOINTMENT (OUTPATIENT)
Dept: INTERNAL MEDICINE | Facility: CLINIC | Age: 77
End: 2022-08-24

## 2022-08-24 VITALS
WEIGHT: 150 LBS | HEIGHT: 62.13 IN | SYSTOLIC BLOOD PRESSURE: 160 MMHG | HEART RATE: 85 BPM | BODY MASS INDEX: 27.26 KG/M2 | DIASTOLIC BLOOD PRESSURE: 80 MMHG | OXYGEN SATURATION: 99 %

## 2022-08-24 PROCEDURE — 99215 OFFICE O/P EST HI 40 MIN: CPT

## 2022-08-24 RX ORDER — MELOXICAM 15 MG/1
15 TABLET ORAL DAILY
Qty: 14 | Refills: 0 | Status: DISCONTINUED | COMMUNITY
Start: 2022-06-08 | End: 2022-08-24

## 2022-08-25 NOTE — HISTORY OF PRESENT ILLNESS
[FreeTextEntry1] : poorly controlled HTN - \par poorly controlled HLD\par not taking meds as prescribed. \par change in hepatic US [de-identified] : 77 yo F with h/o HTN, HLD (briefly took rosuvastatin and then stopped), CML (dx 2012)\par \par Chart reviewed today in preparation for visit. \par \par Ms LIZBETH reports she is still not taking rosuvastatin or carvedilol, and with spironolactone often missing PM dose\par \par RE HTN: mildly elevated last visit but had not taken spironolactone for a week. Rx sent for renewal.  Also was not taking carvedilol which had been prescribed by cardiology and says she did not intend to take it. \par Today , reports  she did not  spironolactone from the pharmacy - only has a few left so still only taking once a day. BP elevated with no sx of CP, HA, SOB.\par RE HLD: briefly took the rosuvastatin in the past - had a significant improvement however stopped after a short time.   resistance very worrisome as 10 yr risk for ASCVD quite high (24.8%)\par RE Hepatic adenomatosis: most recent US for the first time no longer describes liver as having coarse  texture but now says cirrhotic with no discrete lesions. Based on result, was referred to Hepatology and advised to call and schedule appt. Most recent LFT's normal range. she says she lost the name and phone number to schedule appt  - would like it again. \par RE CML: follows regularly with Dr Orellana\par RE knee pain: last visit referred to ortho for further eval. \par RE preDM: most recent  A1c 5.8\par \par \par Prior (5/24/22)\par recently seen by cardiology and started on rosuvastatin and carvedilol. \par Reports she has recently changed her diet - less rice, less pasta, no meat - lots of vegetable juicing - has made these changes along with her daughter. has lost about 12 lbs. since dietary modification.\par Also reports she has not had a chance yet to schedule her mammo and US's.\par And also - has been having left knee pain. started about 3 weeks ago. had some lateral swelling as well but slowly improving. nevertheless, she would like further evaluation.   \par \par RE HTN: Currently on amlodipine  and spironolactone. Says she is not taking the carvedilol and ran out of spironolactone (needs refill) about a week ago -therefore has only taken amlodipine today. \par Brings in home BP log - reviewed together and most in target range.  (see data section of note) \par RE CML: follows with Dr. Esteban Soriano\par RE HLD: reports she is not actually taking the statin.  \par RE hepatic hemangioma: due to repeat Abd US - rx given last visit\par PreDM: last A1c 5.8 in 3/22\par using iceberg lettace tea for sleep - really helps.

## 2022-08-25 NOTE — HEALTH RISK ASSESSMENT
[0] : 2) Feeling down, depressed, or hopeless: Not at all (0) [PHQ-2 Negative - No further assessment needed] : PHQ-2 Negative - No further assessment needed [[id number]] : 0

## 2022-08-25 NOTE — ASSESSMENT
[FreeTextEntry1] : 77 yo F with h/o HTN, HLD (briefly took rosuvastatin and then stopped), CML (dx 2012)\par \par RE HTN: mildly elevated last visit but had not taken spironolactone for a week. Rx sent for renewal.  Also was not taking carvedilol which had been prescribed by cardiology and says she did not intend to take it. \par Today , reports  she did not  spironolactone from the pharmacy - only has a few left so still only taking once a day. BP elevated with no sx of CP, HA, SOB.\par RE HLD: briefly took the rosuvastatin in the past - had a significant improvement however stopped after a short time.   resistance very worrisome as 10 yr risk for ASCVD quite high (24.8%)\par Long discussion regarding her current 10-yr risk for atherosclerotic cardiovascular disease, which is 25%, what that actually means, and potential changes she could make to modify and improve the risk. \par Proposed she start taking ALL the medication's currently recommended – all three BP medications and the statin. Proposed she try that for 6-8 weeks, then return and we can recheck labs, BP,  and re-calculate how her risk changes. \par She seems agreeable. Re-ordering Spirolactone, carvedilol, and rosuvastatin. \par \par med reconciliation done and new list provided to Ms Al\par \par RE Hepatic adenomatosis: most recent US for the first time no longer describes liver as having coarse  texture but now says cirrhotic with no discrete lesions. Based on result, was referred to Hepatology and advised to call and schedule appt. Most recent LFT's normal range. she says she lost the name and phone number to schedule appt  - would like it again. Printed out referral for Ms NIEVES and contact info provided. She will have daughter help her schedule. \par RE CML: follows regularly with Dr Orellana\par \par f/u 6-8 weeks.\par she knows to call with any side effects. \par \par \par \par RE US liver: She was not able to schedule the appointment with hepatology and is requesting the phone number to make an appointment. We explained the findings on ultrasound and what we hope to accomplish at that hepatology visit. She agrees to make appointment. Contact information given. Follow up in 6 to 8 weeks.

## 2022-08-27 ENCOUNTER — OUTPATIENT (OUTPATIENT)
Dept: OUTPATIENT SERVICES | Facility: HOSPITAL | Age: 77
LOS: 1 days | Discharge: HOME | End: 2022-08-27

## 2022-08-27 DIAGNOSIS — C93.10 CHRONIC MYELOMONOCYTIC LEUKEMIA NOT HAVING ACHIEVED REMISSION: ICD-10-CM

## 2022-08-27 DIAGNOSIS — Z98.41 CATARACT EXTRACTION STATUS, RIGHT EYE: Chronic | ICD-10-CM

## 2022-08-31 ENCOUNTER — RESULT REVIEW (OUTPATIENT)
Age: 77
End: 2022-08-31

## 2022-08-31 ENCOUNTER — APPOINTMENT (OUTPATIENT)
Dept: HEMATOLOGY ONCOLOGY | Facility: CLINIC | Age: 77
End: 2022-08-31

## 2022-08-31 VITALS
TEMPERATURE: 97 F | HEIGHT: 62 IN | BODY MASS INDEX: 27.43 KG/M2 | HEART RATE: 78 BPM | DIASTOLIC BLOOD PRESSURE: 83 MMHG | SYSTOLIC BLOOD PRESSURE: 146 MMHG | OXYGEN SATURATION: 99 % | WEIGHT: 149.03 LBS | RESPIRATION RATE: 16 BRPM

## 2022-08-31 LAB
BASOPHILS # BLD AUTO: 0.04 K/UL — SIGNIFICANT CHANGE UP (ref 0–0.2)
BASOPHILS NFR BLD AUTO: 0.8 % — SIGNIFICANT CHANGE UP (ref 0–2)
EOSINOPHIL # BLD AUTO: 0.07 K/UL — SIGNIFICANT CHANGE UP (ref 0–0.5)
EOSINOPHIL NFR BLD AUTO: 1.4 % — SIGNIFICANT CHANGE UP (ref 0–6)
HCT VFR BLD CALC: 42.1 % — SIGNIFICANT CHANGE UP (ref 34.5–45)
HGB BLD-MCNC: 13.5 G/DL — SIGNIFICANT CHANGE UP (ref 11.5–15.5)
IMM GRANULOCYTES NFR BLD AUTO: 0.2 % — SIGNIFICANT CHANGE UP (ref 0–1.5)
LYMPHOCYTES # BLD AUTO: 1.74 K/UL — SIGNIFICANT CHANGE UP (ref 1–3.3)
LYMPHOCYTES # BLD AUTO: 35.3 % — SIGNIFICANT CHANGE UP (ref 13–44)
MCHC RBC-ENTMCNC: 28.7 PG — SIGNIFICANT CHANGE UP (ref 27–34)
MCHC RBC-ENTMCNC: 32.1 G/DL — SIGNIFICANT CHANGE UP (ref 32–36)
MCV RBC AUTO: 89.6 FL — SIGNIFICANT CHANGE UP (ref 80–100)
MONOCYTES # BLD AUTO: 0.4 K/UL — SIGNIFICANT CHANGE UP (ref 0–0.9)
MONOCYTES NFR BLD AUTO: 8.1 % — SIGNIFICANT CHANGE UP (ref 2–14)
NEUTROPHILS # BLD AUTO: 2.67 K/UL — SIGNIFICANT CHANGE UP (ref 1.8–7.4)
NEUTROPHILS NFR BLD AUTO: 54.2 % — SIGNIFICANT CHANGE UP (ref 43–77)
NRBC # BLD: 0 /100 WBCS — SIGNIFICANT CHANGE UP (ref 0–0)
PLATELET # BLD AUTO: 235 K/UL — SIGNIFICANT CHANGE UP (ref 150–400)
RBC # BLD: 4.7 M/UL — SIGNIFICANT CHANGE UP (ref 3.8–5.2)
RBC # FLD: 14 % — SIGNIFICANT CHANGE UP (ref 10.3–14.5)
WBC # BLD: 4.93 K/UL — SIGNIFICANT CHANGE UP (ref 3.8–10.5)
WBC # FLD AUTO: 4.93 K/UL — SIGNIFICANT CHANGE UP (ref 3.8–10.5)

## 2022-08-31 PROCEDURE — 99215 OFFICE O/P EST HI 40 MIN: CPT

## 2022-08-31 NOTE — RESULTS/DATA
[FreeTextEntry1] : review of blood testing from 10/2021; normal CBC \par BCR/ABL less than 0.40 log\par \par 06/08/2022: CBC WBC = 5.86 Hgb 12.8  HCT = 40.2  PLT [id number], MCV; normal differential WBC\par BCR ABL 4% in contrast to 0.004 in 10/21\par 07/20/2022: CBC: WBC 5.83 HGB 12.3 PL [id number]\par 08/31/2022 CBC WBC 4.93 HGB 13.5  000\par

## 2022-08-31 NOTE — PHYSICAL EXAM
[Fully active, able to carry on all pre-disease performance without restriction] : Status 0 - Fully active, able to carry on all pre-disease performance without restriction [Normal] : affect appropriate [Ulcers] : no ulcers [Mucositis] : no mucositis [Thrush] : no thrush [Vesicles] : no vesicles [de-identified] : wears corrective lenses  [de-identified] : Right ankle swelling, no visible signs of trauma noted. no pitting edema  [de-identified] : Generalized dry skin noted  [de-identified] : no gait instability, no gross sensory / motor deficits noted

## 2022-08-31 NOTE — REASON FOR VISIT
[Follow-Up Visit] : a follow-up visit for [Blood Count Assessment] : blood count assessment [Chronic Leukemia] : chronic leukemia [FreeTextEntry2] : 6 month evaluation for chronic myelogenous leukemia

## 2022-08-31 NOTE — HISTORY OF PRESENT ILLNESS
[Disease:__________________________] : Disease: [unfilled] [Treatment Protocol] : Treatment Protocol [0 - No Distress] : Distress Level: 0 [100: Normal, no complaints, no evidence of disease.] : 100: Normal, no complaints, no evidence of disease. [ECOG Performance Status: 0 - Fully active, able to carry on all pre-disease performance without restriction] : Performance Status: 0 - Fully active, able to carry on all pre-disease performance without restriction [de-identified] : 77 year old female with past medical history of hypertension presenting to [facility] for hematologic care. Patient was initially diagnosed with CML in April 2012. She was admitted to [facility] and was seen by Dr Bland of the transfusion service who performed several pheresis procedures to reduce a high platelet count (approximately 3,000,000). She was subsequently started on treatment with imatinib 400 mg PO daily and she was in compliance with treatment for three years. She discontinued treatment on her volition in early 2015. \par \par On March 2015, she was reportedly feeling more tired with anorexia weight loss and presented at PMD with thrombocytosis (2.1 million platelet count). She was admitted with platelet count of 2,088K and underwent multiple cycles of plasmaphereses with improvement of thrombocytosis and begun on nilotinib and hydroxyurea. She stated at the time of admission that she stopped taking Gleevec as well as other antihypertensives as she felt she did not need them. Her hospital course was complicated by herpes zoster with resolution on Valtrex as well as APC's which resolved with metoprolol. She was ultimately discharged on 4/17/2015. She has tolerated the nilotinib without side effects. [FreeTextEntry1] : nilotinib 150 mg tablets PO 2 tablet in AM, 1 tablet in PM [de-identified] : feels well; she is fully vaccinated against COV 2 and has had booster. No symptoms of infection. She is residing at home\par \par 06/08/2022 - Patient was seen in a follow up visit accompanied by her granddaughter Shellie. \par Patient feels well denies any weight loss, fever, chills, nausea, vomiting, headaches, visual disturbances, shortness of breath, abdominal pain, constipation, diarrhea, bleeding, bruising. Since her last visit with us she denies any COVID infection, hospitalization or surgeries. She has received Pfizer vaccination for COVID-19 including booster doses. \par She was having chronic Right foot and ankle pain and Left knee pain was seen by Orthopedics today, reports no fractures will be treated medically. \par She denies any falls, gait instability, use of any assist devices to ambulate. \par Today on routine vitals in our office her BP was high 157/ 91 mmHg pt asymptomatic. States she takes her medications with breakfast and this morning did not eat or take any medications as she had an appointment for her blood work. \par She has been compliant with her medications and her physician follow ups. \par She lives with her  and other family members. She is able to carry out her household tasks cooking, cleaning without any difficulties. \par 07/20/2022 No change from above recent activities and no weight loss and no fever. She tells me that she has been missing dosing of medication doses including one full month of no therapy\par 08/31/2022 states that she has been compliant with nilotinib since the last visit in 7/2022. Denied any symptoms including fever, headache, abdominal  /back pain, nor changes with urination and bowel movement. [Date: ____________] : Patient's last distress assessment performed on [unfilled].

## 2022-08-31 NOTE — END OF VISIT
[>50% of the face to face encounter time was spent on counseling and/or coordination of care for ___] : Greater than 50% of the face to face encounter time was spent on counseling and/or coordination of care for [unfilled] [] : Fellow [FreeTextEntry3] : I have seen this patient on attending rounds and I agree with this note and plan of care in the patient with missed dosing and elevated BCR ABL in the setting of treated chronic myelogenous leukemia [Time Spent: ___ minutes] : I have spent [unfilled] minutes of time on the encounter.

## 2022-08-31 NOTE — REVIEW OF SYSTEMS
[Incontinence] : incontinence [Joint Pain] : joint pain [Insomnia] : insomnia [Negative] : Allergic/Immunologic [Fever] : no fever [Chills] : no chills [Night Sweats] : no night sweats [Fatigue] : no fatigue [Recent Change In Weight] : ~T no recent weight change [Eye Pain] : no eye pain [Red Eyes] : eyes not red [Dry Eyes] : no dryness of the eyes [Vision Problems] : no vision problems [Dysphagia] : no dysphagia [Loss of Hearing] : no loss of hearing [Nosebleeds] : no nosebleeds [Hoarseness] : no hoarseness [Odynophagia] : no odynophagia [Mucosal Pain] : no mucosal pain [Chest Pain] : no chest pain [Palpitations] : no palpitations [Leg Claudication] : no intermittent leg claudication [Lower Ext Edema] : no lower extremity edema [Shortness Of Breath] : no shortness of breath [Wheezing] : no wheezing [Cough] : no cough [SOB on Exertion] : no shortness of breath during exertion [Abdominal Pain] : no abdominal pain [Vomiting] : no vomiting [Constipation] : no constipation [Diarrhea] : no diarrhea [Dysuria] : no dysuria [Vaginal Discharge] : no vaginal discharge [Dysmenorrhea/Abn Vaginal Bleeding] : no dysmenorrhea/abnormal vaginal bleeding [Joint Stiffness] : no joint stiffness [Skin Rash] : no skin rash [Skin Wound] : no skin wound [Confused] : no confusion [Dizziness] : no dizziness [Fainting] : no fainting [Difficulty Walking] : no difficulty walking [Suicidal] : not suicidal [Anxiety] : no anxiety [Depression] : no depression [Proptosis] : no proptosis [Hot Flashes] : no hot flashes [Muscle Weakness] : no muscle weakness [Deepening Of The Voice] : no deepening of the voice [Easy Bleeding] : no tendency for easy bleeding [Easy Bruising] : no tendency for easy bruising [Swollen Glands] : no swollen glands [FreeTextEntry3] : wears corrective lenses, has Glaucoma uses eye drops per Ophtho recommendations  [FreeTextEntry8] : wears pull ups for incontinence, denies hematuria, urgency, frequency, denies any vaginal bleeding [FreeTextEntry9] : right ankle, foot pain unrelated to trauma, Left knee pain  [de-identified] : intermittent insomnia doesn't take any medications

## 2022-08-31 NOTE — ASSESSMENT
[Supportive] : Goals of care discussed with patient: Supportive [Palliative Care Plan] : not applicable at this time [FreeTextEntry1] : HANG Al is a 77 year old female with a 98 month history of diagnosis and treatment of chronic myeloid leukemia; she is currently receiving treatment with nilotinib and she is tolerating therapy well. \par \par She remains in a hematologic complete response and a very good molecular response. No fatigue no weakness and no rise in platelet count. No recent history of bleeding or thrombosis and she has received vaccination against COV 2 without thrombotic tendency underscoring the safety of vaccination.\par \par Her baseline functional status is without change. She has had fatigue and intermittent leg cramping and RLE swelling and poorly controlled BP.  Her PE is significant for mild ankle swelling on the right. 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  \par Plan: \par Venous Doppler bilateral LE negative\par Hypokalemia after holding spironolactone in October. Now clinically stable. She has not followed up with Dr Jay this January. \par CBC and BCR ABL requested 2/2/2022 as her daughter is driving into office for her appointment\par RTC 3 months PRE V\par \par 06/08/2022 - Patient feels well. Her blood pressure was high today - she did not take her am blood pressure medications as she takes it with food and did not eat breakfast as she was getting her blood work done. \par Patient advised to take her blood pressure medication as prescribed and advised not to skip doses. \par Will repeat CBC, CMP and BCR/ ABL levels today. \par Her Chronic Myeloid Leukemia is well controlled on current therapy, will continue Nilotinib 150 mg PO capsules - 2 capsules in am & 1 in pm. \par Medication & Physician follow up compliance emphasized. Patient verbalized understanding, all questions, concerns were addressed during this visit.\par Patient was seen, examined and evaluated with Dr Rios Orellana.\par 07/20/2022 Discussion with Young; she has been missing dosing of the Tasigna; She has missed as much as one month of medication. At times she forgets or feels tired to take medication now after ten years. One prior relapse after 2 years of treatment (eight years ago). She denies depression and has blanca in caring for her grand daughter. 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 CBC and WBC is OK today.\par It is probable that the rise in BCR ABL reflected non use of medication rather than genetic resistance\par RTC in 6 week BCR ABL testing is requested today.\par 08/31/2022 Discussion with pt about the importance of continuing with her nilotinib (2 tabs in am; 1 tabs in pm). Will check BCR/ABL quantitative PCR and CBC lab today. Will call pt once result coming back.  Pt reported that she will has enough nilotinib supply. Hopefully the BCR ABL is lower than 85 noted in 07/2022. If levels remain elevated I will consider mutation testing Pt agrees with the plan.

## 2022-09-07 LAB — T(9;22)(ABL1,BCR)/CONTROL BLD/T: NORMAL

## 2022-11-01 PROBLEM — R79.89 ABNORMAL LIVER FUNCTION TEST: Status: ACTIVE | Noted: 2021-02-21

## 2022-11-02 ENCOUNTER — APPOINTMENT (OUTPATIENT)
Dept: INTERNAL MEDICINE | Facility: CLINIC | Age: 77
End: 2022-11-02

## 2022-11-02 VITALS
WEIGHT: 153 LBS | BODY MASS INDEX: 28.16 KG/M2 | HEIGHT: 62 IN | OXYGEN SATURATION: 98 % | DIASTOLIC BLOOD PRESSURE: 70 MMHG | SYSTOLIC BLOOD PRESSURE: 160 MMHG | HEART RATE: 69 BPM

## 2022-11-02 DIAGNOSIS — R79.89 OTHER SPECIFIED ABNORMAL FINDINGS OF BLOOD CHEMISTRY: ICD-10-CM

## 2022-11-02 DIAGNOSIS — E87.6 HYPOKALEMIA: ICD-10-CM

## 2022-11-02 DIAGNOSIS — R21 RASH AND OTHER NONSPECIFIC SKIN ERUPTION: ICD-10-CM

## 2022-11-02 PROCEDURE — 99214 OFFICE O/P EST MOD 30 MIN: CPT

## 2022-11-07 LAB
ALBUMIN SERPL ELPH-MCNC: 4.3 G/DL
ALP BLD-CCNC: 56 U/L
ALT SERPL-CCNC: 13 U/L
ANION GAP SERPL CALC-SCNC: 13 MMOL/L
AST SERPL-CCNC: 18 U/L
BASOPHILS # BLD AUTO: 0.05 K/UL
BASOPHILS NFR BLD AUTO: 0.8 %
BILIRUB SERPL-MCNC: 1.2 MG/DL
BUN SERPL-MCNC: 12 MG/DL
CALCIUM SERPL-MCNC: 9.6 MG/DL
CHLORIDE SERPL-SCNC: 99 MMOL/L
CHOLEST SERPL-MCNC: 243 MG/DL
CO2 SERPL-SCNC: 26 MMOL/L
CREAT SERPL-MCNC: 0.75 MG/DL
EGFR: 81 ML/MIN/1.73M2
EOSINOPHIL # BLD AUTO: 0.28 K/UL
EOSINOPHIL NFR BLD AUTO: 4.7 %
GLUCOSE SERPL-MCNC: 91 MG/DL
HCT VFR BLD CALC: 40.4 %
HDLC SERPL-MCNC: 86 MG/DL
HGB BLD-MCNC: 12.8 G/DL
IMM GRANULOCYTES NFR BLD AUTO: 0.2 %
LDLC SERPL CALC-MCNC: 137 MG/DL
LYMPHOCYTES # BLD AUTO: 2.2 K/UL
LYMPHOCYTES NFR BLD AUTO: 37.2 %
MAN DIFF?: NORMAL
MCHC RBC-ENTMCNC: 28.7 PG
MCHC RBC-ENTMCNC: 31.7 GM/DL
MCV RBC AUTO: 90.6 FL
MONOCYTES # BLD AUTO: 0.57 K/UL
MONOCYTES NFR BLD AUTO: 9.6 %
NEUTROPHILS # BLD AUTO: 2.8 K/UL
NEUTROPHILS NFR BLD AUTO: 47.5 %
NONHDLC SERPL-MCNC: 157 MG/DL
PLATELET # BLD AUTO: 195 K/UL
POTASSIUM SERPL-SCNC: 3.8 MMOL/L
PROT SERPL-MCNC: 7.2 G/DL
RBC # BLD: 4.46 M/UL
RBC # FLD: 14.5 %
SODIUM SERPL-SCNC: 139 MMOL/L
TRIGL SERPL-MCNC: 101 MG/DL
WBC # FLD AUTO: 5.91 K/UL

## 2022-11-07 NOTE — HISTORY OF PRESENT ILLNESS
[FreeTextEntry1] : Elevated BP and chol\par Rash on her abdomen, upper back, upper arms.  [de-identified] : 75 yo F with h/o HTN, HLD (briefly took rosuvastatin and then stopped), CML (dx 2012)\par \par Chart reviewed today in preparation for visit. \par \par has had rash for about 2-3 weeks - now in drying phase. not painful, slight itch - not much. She is taking, statin, amlodipine, spironolactone, and b-blocker. \par using diclofenac on the rash which helps cool it off but has otherwise not helped. \par \par 11/2/22\par last visit Ms Al agreed to trial of taking all her BP meds (spironolactone, carvedilol and her statin) and then to repeat BP and labs at f/u visit. . In addition, was referred to hepatology [facility] after recent US suggested cirrhotic texture. \par \par (Prior 8/24/22)\par RE HTN: mildly elevated last visit but had not taken spironolactone for a week. Rx sent for renewal.  Also was not taking carvedilol which had been prescribed by cardiology and says she did not intend to take it. \par Today , reports  she did not  spironolactone from the pharmacy - only has a few left so still only taking once a day. BP elevated with no sx of CP, HA, SOB.\par RE HLD: briefly took the rosuvastatin in the past - had a significant improvement however stopped after a short time.   resistance very worrisome as 10 yr risk for ASCVD quite high (24.8%)\par Long discussion regarding her current 10-yr risk for atherosclerotic cardiovascular disease, which is 25%, what that actually means, and potential changes she could make to modify and improve the risk. \par Proposed she start taking ALL the medication's currently recommended – all three BP medications and the statin. Proposed she try that for 6-8 weeks, then return and we can recheck labs, BP,  and re-calculate how her risk changes. \par She seems agreeable. Re-ordering Spirolactone, carvedilol, and rosuvastatin. \par RE Hepatic adenomatosis: most recent US for the first time no longer describes liver as having coarse  texture but now says cirrhotic with no discrete lesions. Based on result, was referred to Hepatology and advised to call and schedule appt. Most recent LFT's normal range. she says she lost the name and phone number to schedule appt  - would like it again. Printed out referral for Ms LIZBETH and contact info provided. She will have daughter help her schedule. \par RE CML: follows regularly with Dr Orellana\par \par f/u 6-8 weeks.\par she knows to call with any side effects. \par \par RE US liver: She was not able to schedule the appointment with hepatology and is requesting the phone number to make an appointment. We explained the findings on ultrasound and what we hope to accomplish at that hepatology visit. She agrees to make appointment. Contact information given. Follow up in 6 to 8 weeks.\par

## 2022-11-07 NOTE — PHYSICAL EXAM
[No Acute Distress] : no acute distress [Well-Appearing] : well-appearing [PERRL] : pupils equal round and reactive to light [EOMI] : extraocular movements intact [No Respiratory Distress] : no respiratory distress  [Regular Rhythm] : with a regular rhythm [Clear to Auscultation] : lungs were clear to auscultation bilaterally [Normal S1, S2] : normal S1 and S2 [Normal Anterior Cervical Nodes] : no anterior cervical lymphadenopathy [No Spinal Tenderness] : no spinal tenderness [No Focal Deficits] : no focal deficits [Grossly Normal Strength/Tone] : grossly normal strength/tone [de-identified] : extensive macular rash on erythematous base over abdomen, upper back, arms.

## 2022-11-07 NOTE — ASSESSMENT
[FreeTextEntry1] : 75 yo F with h/o HTN, HLD (briefly took rosuvastatin and then stopped), CML (dx 2012)\par \par She is taking, statin, amlodipine, spironolactone, and b-blocker. \par using diclofenac on the rash which helps cool it off but has otherwise not helped. \par \par  RE Rash:  has had rash for about 2-3 weeks - now in drying phase. not painful, slight itch - not much. unclear etiology - referring to derm. doubt related to meds as has happened before when she was not on new meds. Advised stop using diclofenac on it. \par Abnormal liver US with finding of cirrhosis: SW to assist with scheduling hepatology appt as she has been having difficulty.  sending for fibroscan in the interim. \par HTN: no change despite taking all her meds. f/u with cards. ? reactive HTN? says BP's OK at home. bring in log and machine to next visit. Decrease Na. \par HLD: finally taking sttin - check labs\par

## 2022-12-07 ENCOUNTER — LABORATORY RESULT (OUTPATIENT)
Age: 77
End: 2022-12-07

## 2022-12-07 ENCOUNTER — APPOINTMENT (OUTPATIENT)
Dept: DERMATOLOGY | Facility: CLINIC | Age: 77
End: 2022-12-07

## 2022-12-07 VITALS — BODY MASS INDEX: 27.29 KG/M2 | HEIGHT: 63 IN | WEIGHT: 154 LBS

## 2022-12-07 DIAGNOSIS — D48.5 NEOPLASM OF UNCERTAIN BEHAVIOR OF SKIN: ICD-10-CM

## 2022-12-07 DIAGNOSIS — B35.3 TINEA PEDIS: ICD-10-CM

## 2022-12-07 DIAGNOSIS — L30.9 DERMATITIS, UNSPECIFIED: ICD-10-CM

## 2022-12-07 PROCEDURE — 99204 OFFICE O/P NEW MOD 45 MIN: CPT | Mod: 25

## 2022-12-07 PROCEDURE — 11102 TANGNTL BX SKIN SINGLE LES: CPT

## 2022-12-07 RX ORDER — TRIAMCINOLONE ACETONIDE 1 MG/G
0.1 OINTMENT TOPICAL
Qty: 1 | Refills: 0 | Status: ACTIVE | COMMUNITY
Start: 2022-12-07 | End: 1900-01-01

## 2022-12-07 RX ORDER — KETOCONAZOLE 20 MG/G
2 CREAM TOPICAL
Qty: 1 | Refills: 1 | Status: ACTIVE | COMMUNITY
Start: 2022-12-07 | End: 1900-01-01

## 2022-12-08 ENCOUNTER — NON-APPOINTMENT (OUTPATIENT)
Age: 77
End: 2022-12-08

## 2022-12-08 ENCOUNTER — APPOINTMENT (OUTPATIENT)
Dept: OPHTHALMOLOGY | Facility: CLINIC | Age: 77
End: 2022-12-08

## 2022-12-08 PROCEDURE — 92083 EXTENDED VISUAL FIELD XM: CPT

## 2022-12-08 PROCEDURE — 92012 INTRM OPH EXAM EST PATIENT: CPT

## 2022-12-14 ENCOUNTER — OUTPATIENT (OUTPATIENT)
Dept: OUTPATIENT SERVICES | Facility: HOSPITAL | Age: 77
LOS: 1 days | Discharge: HOME | End: 2022-12-14

## 2022-12-14 DIAGNOSIS — C93.10 CHRONIC MYELOMONOCYTIC LEUKEMIA NOT HAVING ACHIEVED REMISSION: ICD-10-CM

## 2022-12-14 DIAGNOSIS — Z98.41 CATARACT EXTRACTION STATUS, RIGHT EYE: Chronic | ICD-10-CM

## 2022-12-16 ENCOUNTER — APPOINTMENT (OUTPATIENT)
Dept: HEPATOLOGY | Facility: CLINIC | Age: 77
End: 2022-12-16
Payer: MEDICARE

## 2022-12-16 VITALS
HEIGHT: 63 IN | OXYGEN SATURATION: 98 % | WEIGHT: 150 LBS | TEMPERATURE: 97.2 F | BODY MASS INDEX: 26.58 KG/M2 | DIASTOLIC BLOOD PRESSURE: 92 MMHG | RESPIRATION RATE: 16 BRPM | HEART RATE: 90 BPM | SYSTOLIC BLOOD PRESSURE: 176 MMHG

## 2022-12-16 DIAGNOSIS — D13.4 BENIGN NEOPLASM OF LIVER: ICD-10-CM

## 2022-12-16 DIAGNOSIS — R79.89 OTHER SPECIFIED ABNORMAL FINDINGS OF BLOOD CHEMISTRY: ICD-10-CM

## 2022-12-16 PROCEDURE — 99204 OFFICE O/P NEW MOD 45 MIN: CPT

## 2022-12-16 PROCEDURE — 99214 OFFICE O/P EST MOD 30 MIN: CPT

## 2022-12-16 RX ORDER — LATANOPROST/PF 0.005 %
0.01 DROPS OPHTHALMIC (EYE)
Refills: 0 | Status: ACTIVE | COMMUNITY
Start: 2022-03-24

## 2022-12-19 PROBLEM — R79.89 ELEVATED FERRITIN LEVEL: Status: ACTIVE | Noted: 2022-12-19

## 2022-12-19 LAB
25(OH)D3 SERPL-MCNC: 15.4 NG/ML
A1AT SERPL-MCNC: 162 MG/DL
AFP-TM SERPL-MCNC: 6.2 NG/ML
ALBUMIN SERPL ELPH-MCNC: 4.3 G/DL
ALP BLD-CCNC: 57 U/L
ALT SERPL-CCNC: 11 U/L
ANA SER IF-ACNC: NEGATIVE
ANION GAP SERPL CALC-SCNC: 14 MMOL/L
APTT BLD: 32.7 SEC
AST SERPL-CCNC: 14 U/L
BASOPHILS # BLD AUTO: 0.04 K/UL
BASOPHILS NFR BLD AUTO: 0.7 %
BILIRUB DIRECT SERPL-MCNC: 0.2 MG/DL
BILIRUB INDIRECT SERPL-MCNC: 0.4 MG/DL
BILIRUB SERPL-MCNC: 0.5 MG/DL
BUN SERPL-MCNC: 9 MG/DL
CALCIUM SERPL-MCNC: 9.5 MG/DL
CERULOPLASMIN SERPL-MCNC: 34 MG/DL
CHLORIDE SERPL-SCNC: 101 MMOL/L
CO2 SERPL-SCNC: 28 MMOL/L
CREAT SERPL-MCNC: 0.76 MG/DL
DEPRECATED KAPPA LC FREE/LAMBDA SER: 1.98 RATIO
EGFR: 80 ML/MIN/1.73M2
EOSINOPHIL # BLD AUTO: 0.07 K/UL
EOSINOPHIL NFR BLD AUTO: 1.3 %
FERRITIN SERPL-MCNC: 439 NG/ML
GLUCOSE SERPL-MCNC: 92 MG/DL
HBV CORE IGG+IGM SER QL: REACTIVE
HBV SURFACE AB SER QL: REACTIVE
HBV SURFACE AG SER QL: NONREACTIVE
HCT VFR BLD CALC: 41 %
HCV AB SER QL: NONREACTIVE
HCV S/CO RATIO: 0.11 S/CO
HEPATITIS A IGG ANTIBODY: REACTIVE
HGB BLD-MCNC: 12.6 G/DL
IGA SER QL IEP: 270 MG/DL
IGG SER QL IEP: 1270 MG/DL
IGM SER QL IEP: 131 MG/DL
IMM GRANULOCYTES NFR BLD AUTO: 0.2 %
INR PPP: 1.03 RATIO
IRON SATN MFR SERPL: 23 %
IRON SERPL-MCNC: 65 UG/DL
KAPPA LC CSF-MCNC: 1.21 MG/DL
KAPPA LC SERPL-MCNC: 2.4 MG/DL
LKM AB SER QL IF: <20.1 UNITS
LYMPHOCYTES # BLD AUTO: 1.84 K/UL
LYMPHOCYTES NFR BLD AUTO: 33.4 %
MAN DIFF?: NORMAL
MCHC RBC-ENTMCNC: 28.4 PG
MCHC RBC-ENTMCNC: 30.7 GM/DL
MCV RBC AUTO: 92.6 FL
MITOCHONDRIA AB SER IF-ACNC: NORMAL
MONOCYTES # BLD AUTO: 0.46 K/UL
MONOCYTES NFR BLD AUTO: 8.3 %
NEUTROPHILS # BLD AUTO: 3.09 K/UL
NEUTROPHILS NFR BLD AUTO: 56.1 %
PLATELET # BLD AUTO: 215 K/UL
POTASSIUM SERPL-SCNC: 3.3 MMOL/L
PROT SERPL-MCNC: 7.1 G/DL
PT BLD: 12.2 SEC
RBC # BLD: 4.43 M/UL
RBC # FLD: 14.1 %
SMOOTH MUSCLE AB SER QL IF: NORMAL
SODIUM SERPL-SCNC: 144 MMOL/L
TIBC SERPL-MCNC: 276 UG/DL
UIBC SERPL-MCNC: 211 UG/DL
WBC # FLD AUTO: 5.51 K/UL

## 2022-12-19 NOTE — ASSESSMENT
[FreeTextEntry1] : 76 yo Female with HTN, HLD (Chol 243 11/2022), prediabetes (5.8% HBA1c 5/2022), CML (Dx 2012, following with Dr. Orellana, on nilotinib), MR, TR, AR (all moderate on echo 2021, EF 55%), pulmonary hypertension, essential thrombocythemia (required plasmapheresis in past), glaucoma,  is here for initial evaluation b/o cirrhosis. Per chart she also has Hx of hepatic adenomatosis, but US exams available in the system do not mention liver lesion.\par She reports that had liver surgery / resection of tumor (reportedly benign), over 25 years ago, cannot recall details, reportedly was at the [facility] which has been closed since then. \par \par Cirrhosis\par - Etiology? Has metabolic and cardiac risk factors. Born in [place]. \par - Will get Fibroscan \par - Will get CLD blood work\par - No ascites on physical exam and on prior imaging, no hx of SBP in past , not on SBP prophylaxis, not on diuretics. Normal BUN/Cr.\par - No signs of overt hepatic encephalopathy.\par - Esophageal varix (EV) screening: will follow LSM and PLT count and order if meets criteria, but PLT count might not be informative b/o ET\par - HCC screening: Ordered MRI, will get AFP\par \par Hepatic adenomatosis ?\par Prior hepatic resection (noted surgical clips)\par - Will get MRI abd w/w contrast \par \par \par Health maintenance:\par Counseled on liver healthy diet, including but not limited to avoiding alcohol, illicit drug, avoiding eating any unpasteurized dairy products; avoiding eating any raw or undercooked eggs, fish, poultry, or meat; and avoiding eating raw/steamed oysters or other shellfish to avoid risk of Vibrio infection. \par Advised on avoiding use of herbal and dietary supplements due to potential hepatotoxicity, and limit use of acetaminophen to <2 grams per day. Advised on avoiding use of nonsteroidal antiinflammatory drugs (NSAIDs) as these can precipitate renal dysfunction in patients with advanced liver disease. \par \par \par \par RTC 3-4 weeks but to call office to discuss results before

## 2022-12-19 NOTE — PHYSICAL EXAM
[Scleral Icterus] : No Scleral Icterus [Spider Angioma] : No spider angioma(s) were observed [Abdominal  Ascites] : no ascites [Non-Tender] : non-tender [Asterixis] : no asterixis observed [Jaundice] : No jaundice [Depression] : no depression [General Appearance - Alert] : alert [General Appearance - In No Acute Distress] : in no acute distress [General Appearance - Well Nourished] : well nourished [General Appearance - Well Developed] : well developed [Sclera] : the sclera and conjunctiva were normal [Oropharynx] : the oropharynx was normal [Neck Appearance] : the appearance of the neck was normal [Jugular Venous Distention Increased] : there was no jugular-venous distention [] : no respiratory distress [Respiration, Rhythm And Depth] : normal respiratory rhythm and effort [Exaggerated Use Of Accessory Muscles For Inspiration] : no accessory muscle use [Auscultation Breath Sounds / Voice Sounds] : lungs were clear to auscultation bilaterally [Heart Rate And Rhythm] : heart rate was normal and rhythm regular [Heart Sounds] : normal S1 and S2 [Rounded] : rounded [Edema] : there was no peripheral edema [Normal] : normal [Dilated Collat. Veins] : no collateral vein dilation [Scar] : a scar was noted [Soft, Nontender] : the abdomen was soft and nontender [Firm] : not firm [Rigid] : not rigid [Rebound] : no rebound [Guarding] : no guarding [Liver Tender To Palpation] : not tender [None] : no CVA tenderness [Cervical Lymph Nodes Enlarged Posterior Bilaterally] : posterior cervical [Cervical Lymph Nodes Enlarged Anterior Bilaterally] : anterior cervical [Supraclavicular Lymph Nodes Enlarged Bilaterally] : supraclavicular [Axillary Lymph Nodes Enlarged Bilaterally] : axillary [Femoral Lymph Nodes Enlarged Bilaterally] : femoral [Inguinal Lymph Nodes Enlarged Bilaterally] : inguinal [No CVA Tenderness] : no ~M costovertebral angle tenderness [No Spinal Tenderness] : no spinal tenderness [Abnormal Walk] : normal gait [Skin Color & Pigmentation] : normal skin color and pigmentation [FreeTextEntry1] : Grossly intact [Oriented To Time, Place, And Person] : oriented to person, place, and time [Impaired Insight] : insight and judgment were intact [Affect] : the affect was normal [Mood] : the mood was normal

## 2022-12-19 NOTE — REVIEW OF SYSTEMS
[Cough] : cough [Negative] : ENT [Fever] : no fever [Chills] : no chills [Feeling Poorly] : not feeling poorly [Feeling Tired] : not feeling tired [Recent Weight Gain (___ Lbs)] : no recent weight gain [Recent Weight Loss (___ Lbs)] : no recent weight loss [Dry Eyes] : no dryness of the eyes [Chest Pain] : no chest pain [Palpitations] : no palpitations [Lower Ext Edema] : no lower extremity edema [Shortness Of Breath] : no shortness of breath [SOB on Exertion] : no shortness of breath during exertion [Orthopnea] : no orthopnea [Abdominal Pain] : no abdominal pain [Vomiting] : no vomiting [Constipation] : no constipation [Diarrhea] : no diarrhea [Heartburn] : no heartburn [Melena] : no melena [Dysuria] : no dysuria [Arthralgias] : no arthralgias [Itching] : no itching [Confused] : no confusion [Easy Bleeding] : no tendency for easy bleeding [Easy Bruising] : no tendency for easy bruising [Swollen Glands] : no swollen glands [Swollen Glands In The Neck] : no swollen glands in the neck [FreeTextEntry3] : Glaucoma, cataract [FreeTextEntry6] : Mild cough today with white mucus [FreeTextEntry7] : No nausea.  No hematochezia.  [FreeTextEntry8] : Urinary frequency

## 2022-12-19 NOTE — HISTORY OF PRESENT ILLNESS
[Infected Sexual Partner] : no infected sexual partner [IV Drug Use] : no IV drug use [Tattoo] : no tattoos [Body Piercing] : no body piercing [Hemodialysis] : no hemodialysis [Transfusion before 1992] : no transfusion before 1992 [Transplant before 1992] : no transplant before 1992 [Alcohol Abuse] : no alcohol abuse [Household Contact to HBV] : no household contact to HBV [Cocaine Use] : no cocaine use [de-identified] : Only social alcohol, former smoker. Born in [place]. Worked as  at [employer] [FreeTextEntry1] : 76 yo Female with HTN, HLD (Chol 243 11/2022), prediabetes (5.8% HBA1c 5/2022), CML (Dx 2012, following with Dr. Orellana, on nilotinib), MR, TR, AR (all moderate on echo 2021, EF 55%), pulmonary hypertension, essential thrombocythemia (required plasmapheresis in past), glaucoma,  is here for initial evaluation b/o cirrhosis. Per chart she also has Hx of hepatic adenomatosis, but US exams available in the system do not mention liver lesion.\par She reports that had liver surgery / resection of tumor (reportedly benign), over 25 years ago, cannot recall details, reportedly was at the [facility] which has been closed since then. \par \par US abd 6/2022 showed coarsened echotexture and nodular left lobe compatible with cirrhosis; surgical clip right lobe of liver, no ascites, normal size spleen, normal biliary tree. \par US abd from 8/2020 already showed coarsened echotexture liver. \par MRI abd 2013 showed normal morphology liver, but multiple arterially enhancing hepatic lesions, reported compatible with her Hx of multiple hepatic adenomas. \par MRI abd 2011 already reported the multiple hepatic lesions and 5 years stability. \par \par Prior liver workup: Hep C ab neg. \par

## 2022-12-20 ENCOUNTER — APPOINTMENT (OUTPATIENT)
Dept: HEMATOLOGY ONCOLOGY | Facility: CLINIC | Age: 77
End: 2022-12-20

## 2022-12-20 ENCOUNTER — RESULT REVIEW (OUTPATIENT)
Age: 77
End: 2022-12-20

## 2022-12-20 VITALS
OXYGEN SATURATION: 99 % | HEIGHT: 62.99 IN | HEART RATE: 66 BPM | WEIGHT: 152.78 LBS | BODY MASS INDEX: 27.07 KG/M2 | RESPIRATION RATE: 16 BRPM | TEMPERATURE: 97.2 F | DIASTOLIC BLOOD PRESSURE: 75 MMHG | SYSTOLIC BLOOD PRESSURE: 144 MMHG

## 2022-12-20 LAB
ALBUMIN SERPL ELPH-MCNC: 4.3 G/DL
ALP BLD-CCNC: 52 U/L
ALT SERPL-CCNC: 9 U/L
ANION GAP SERPL CALC-SCNC: 9 MMOL/L
AST SERPL W P-5'-P-CCNC: 18 IU/L
AST SERPL-CCNC: 12 U/L
BASOPHILS # BLD AUTO: 0.03 K/UL — SIGNIFICANT CHANGE UP (ref 0–0.2)
BASOPHILS NFR BLD AUTO: 0.5 % — SIGNIFICANT CHANGE UP (ref 0–2)
BILIRUB SERPL-MCNC: 0.5 MG/DL
BUN SERPL-MCNC: 8 MG/DL
CALCIUM SERPL-MCNC: 9.1 MG/DL
CHLORIDE SERPL-SCNC: 103 MMOL/L
CHOLEST SERPL-MCNC: 258 MG/DL
CO2 SERPL-SCNC: 30 MMOL/L
COMMENT:: NORMAL
CREAT SERPL-MCNC: 0.79 MG/DL
EGFR: 77 ML/MIN/1.73M2
EOSINOPHIL # BLD AUTO: 0.13 K/UL — SIGNIFICANT CHANGE UP (ref 0–0.5)
EOSINOPHIL NFR BLD AUTO: 2 % — SIGNIFICANT CHANGE UP (ref 0–6)
FIBROSIS STAGE SERPL QL: NORMAL
FIBROSURE ALPHA 2 MACROGLOBULINS: 148 MG/DL
FIBROSURE ALT (SGPT): 12 IU/L
FIBROSURE APOLIPOPROTEIN A1: 154 MG/DL
FIBROSURE GGT: 22 IU/L
FIBROSURE HAPTOGLOBIN: 278 MG/DL
FIBROSURE SCORING: NORMAL
FIBROSURE TOTAL BILIRUBIN: 0.5 MG/DL
GLUCOSE SERPL-MCNC: 106 MG/DL
GLUCOSE SERPL-MCNC: 89 MG/DL
HCT VFR BLD CALC: 39.5 % — SIGNIFICANT CHANGE UP (ref 34.5–45)
HGB BLD-MCNC: 12.9 G/DL — SIGNIFICANT CHANGE UP (ref 11.5–15.5)
IMM GRANULOCYTES NFR BLD AUTO: 0.6 % — SIGNIFICANT CHANGE UP (ref 0–0.9)
INTERPRETATIONS:: NORMAL
LIVER FIBR SCORE SERPL CALC.FIBROSURE: 0.12
LYMPHOCYTES # BLD AUTO: 1.95 K/UL — SIGNIFICANT CHANGE UP (ref 1–3.3)
LYMPHOCYTES # BLD AUTO: 30.6 % — SIGNIFICANT CHANGE UP (ref 13–44)
MCHC RBC-ENTMCNC: 28.6 PG — SIGNIFICANT CHANGE UP (ref 27–34)
MCHC RBC-ENTMCNC: 32.7 G/DL — SIGNIFICANT CHANGE UP (ref 32–36)
MCV RBC AUTO: 87.6 FL — SIGNIFICANT CHANGE UP (ref 80–100)
MONOCYTES # BLD AUTO: 0.49 K/UL — SIGNIFICANT CHANGE UP (ref 0–0.9)
MONOCYTES NFR BLD AUTO: 7.7 % — SIGNIFICANT CHANGE UP (ref 2–14)
NASH SCORING: NORMAL
NECROINFLAMMATORY ACT GRADE SERPL QL: NORMAL
NECROINFLAMMATORY ACT SCORE SERPL: 0.5
NEUTROPHILS # BLD AUTO: 3.73 K/UL — SIGNIFICANT CHANGE UP (ref 1.8–7.4)
NEUTROPHILS NFR BLD AUTO: 58.6 % — SIGNIFICANT CHANGE UP (ref 43–77)
NRBC # BLD: 0 /100 WBCS — SIGNIFICANT CHANGE UP (ref 0–0)
PLATELET # BLD AUTO: 198 K/UL — SIGNIFICANT CHANGE UP (ref 150–400)
POTASSIUM SERPL-SCNC: 3.6 MMOL/L
PROT SERPL-MCNC: 6.8 G/DL
RBC # BLD: 4.51 M/UL — SIGNIFICANT CHANGE UP (ref 3.8–5.2)
RBC # FLD: 13.8 % — SIGNIFICANT CHANGE UP (ref 10.3–14.5)
SERVICE CMNT-IMP: NORMAL
SODIUM SERPL-SCNC: 142 MMOL/L
STEATOSIS GRADE: NORMAL
STEATOSIS GRADING: NORMAL
STEATOSIS SCORE: 0.48
TRIGL SERPL-MCNC: 100 MG/DL
WBC # BLD: 6.37 K/UL — SIGNIFICANT CHANGE UP (ref 3.8–10.5)
WBC # FLD AUTO: 6.37 K/UL — SIGNIFICANT CHANGE UP (ref 3.8–10.5)

## 2022-12-20 PROCEDURE — 99214 OFFICE O/P EST MOD 30 MIN: CPT

## 2022-12-20 NOTE — RESULTS/DATA
[FreeTextEntry1] : review of blood testing from 10/2021; normal CBC \par BCR/ABL less than 0.40 log\par \par 06/08/2022: CBC WBC = 5.86 Hgb 12.8  HCT = 40.2  PLT [id number], MCV; normal differential WBC\par BCR ABL 4% in contrast to 0.004 in 10/21\par 07/20/2022: CBC: WBC 5.83 HGB 12.3 PL [id number]\par 09/01/2022 BCR ABL positive 0.4 %\par 12/20/2022 CBC WBC 6.37 HGB 12.9  000\par

## 2022-12-20 NOTE — HISTORY OF PRESENT ILLNESS
[Disease:__________________________] : Disease: [unfilled] [Treatment Protocol] : Treatment Protocol [Date: ____________] : Patient's last distress assessment performed on [unfilled]. [0 - No Distress] : Distress Level: 0 [100: Normal, no complaints, no evidence of disease.] : 100: Normal, no complaints, no evidence of disease. [ECOG Performance Status: 0 - Fully active, able to carry on all pre-disease performance without restriction] : Performance Status: 0 - Fully active, able to carry on all pre-disease performance without restriction [de-identified] : 77 year old female with past medical history of hypertension presenting to [facility] for hematologic care. Patient was initially diagnosed with CML in April 2012. She was admitted to [facility] and was seen by Dr Bland of the transfusion service who performed several pheresis procedures to reduce a high platelet count (approximately 3,000,000). She was subsequently started on treatment with imatinib 400 mg PO daily and she was in compliance with treatment for three years. She discontinued treatment on her volition in early 2015. \par \par On March 2015, she was reportedly feeling more tired with anorexia weight loss and presented at PMD with thrombocytosis (2.1 million platelet count). She was admitted with platelet count of 2,088K and underwent multiple cycles of plasmaphereses with improvement of thrombocytosis and begun on nilotinib and hydroxyurea. She stated at the time of admission that she stopped taking Gleevec as well as other antihypertensives as she felt she did not need them. Her hospital course was complicated by herpes zoster with resolution on Valtrex as well as APC's which resolved with metoprolol. She was ultimately discharged on 4/17/2015. She has tolerated the nilotinib without side effects. [FreeTextEntry1] : nilotinib 150 mg tablets PO 2 tablet in AM, 1 tablet in PM [de-identified] : feels well; she is fully vaccinated against COV 2 and has had booster. No symptoms of infection. She is residing at home\par 06/08/2022 - Patient was seen in a follow up visit accompanied by her granddaughter Shellie. \par Patient feels well denies any weight loss, fever, chills, nausea, vomiting, headaches, visual disturbances, shortness of breath, abdominal pain, constipation, diarrhea, bleeding, bruising. Since her last visit with us she denies any COVID infection, hospitalization or surgeries. She has received Pfizer vaccination for COVID-19 including booster doses. \par She was having chronic Right foot and ankle pain and Left knee pain was seen by Orthopedics today, reports no fractures will be treated medically. \par She denies any falls, gait instability, use of any assist devices to ambulate. \par Today on routine vitals in our office her BP was high 157/ 91 mmHg pt asymptomatic. States she takes her medications with breakfast and this morning did not eat or take any medications as she had an appointment for her blood work.\par She has been compliant with her medications and her physician follow ups.  \par She lives with her  and other family members. She is able to carry out her household tasks cooking, cleaning without any difficulties. \par 07/20/2022 No change from above recent activities and no weight loss and no fever. She tells me that she has been missing dosing of medication doses including one full month of no therapy\par 12/20/2022 she has told me that she had been taking medication daily and has not skipped dosing. Normal CBC today. No falls and no hospitalzation. She now has six great grand children. She is seeing Dr Peters She has help with her  for home care

## 2022-12-20 NOTE — REVIEW OF SYSTEMS
[Incontinence] : incontinence [Joint Pain] : joint pain [Insomnia] : insomnia [Negative] : Allergic/Immunologic [Fever] : no fever [Chills] : no chills [Night Sweats] : no night sweats [Fatigue] : no fatigue [Recent Change In Weight] : ~T no recent weight change [Eye Pain] : no eye pain [Red Eyes] : eyes not red [Dry Eyes] : no dryness of the eyes [Vision Problems] : no vision problems [Dysphagia] : no dysphagia [Loss of Hearing] : no loss of hearing [Nosebleeds] : no nosebleeds [Hoarseness] : no hoarseness [Odynophagia] : no odynophagia [Mucosal Pain] : no mucosal pain [Chest Pain] : no chest pain [Palpitations] : no palpitations [Leg Claudication] : no intermittent leg claudication [Lower Ext Edema] : no lower extremity edema [Shortness Of Breath] : no shortness of breath [Wheezing] : no wheezing [Cough] : no cough [SOB on Exertion] : no shortness of breath during exertion [Abdominal Pain] : no abdominal pain [Vomiting] : no vomiting [Constipation] : no constipation [Diarrhea] : no diarrhea [Dysuria] : no dysuria [Vaginal Discharge] : no vaginal discharge [Dysmenorrhea/Abn Vaginal Bleeding] : no dysmenorrhea/abnormal vaginal bleeding [Joint Stiffness] : no joint stiffness [Skin Rash] : no skin rash [Skin Wound] : no skin wound [Confused] : no confusion [Dizziness] : no dizziness [Fainting] : no fainting [Difficulty Walking] : no difficulty walking [Suicidal] : not suicidal [Anxiety] : no anxiety [Depression] : no depression [Proptosis] : no proptosis [Hot Flashes] : no hot flashes [Muscle Weakness] : no muscle weakness [Deepening Of The Voice] : no deepening of the voice [Easy Bleeding] : no tendency for easy bleeding [Easy Bruising] : no tendency for easy bruising [Swollen Glands] : no swollen glands [FreeTextEntry3] : wears corrective lenses, has Glaucoma uses eye drops per Ophtho recommendations  [FreeTextEntry8] : wears pull ups for incontinence, denies hematuria, urgency, frequency, denies any vaginal bleeding [FreeTextEntry9] : right ankle, foot pain unrelated to trauma, Left knee pain  [de-identified] : intermittent insomnia doesn't take any medications

## 2022-12-20 NOTE — PHYSICAL EXAM
[Fully active, able to carry on all pre-disease performance without restriction] : Status 0 - Fully active, able to carry on all pre-disease performance without restriction [Normal] : affect appropriate [Ulcers] : no ulcers [Mucositis] : no mucositis [Thrush] : no thrush [Vesicles] : no vesicles [de-identified] : wears corrective lenses  [de-identified] : Right ankle swelling, no visible signs of trauma noted. no pitting edema  [de-identified] : Generalized dry skin noted  [de-identified] : no gait instability, no gross sensory / motor deficits noted

## 2022-12-20 NOTE — ASSESSMENT
[Supportive] : Goals of care discussed with patient: Supportive [Palliative Care Plan] : not applicable at this time [FreeTextEntry1] : HANG Al is a 76 year old female with a 98 month history of diagnosis and treatment of chronic myeloid leukemia; she is currently receiving treatment with nilotinib and she is tolerating therapy well. \par She remains in a hematologic complete response and a very good molecular response. No fatigue no weakness and no rise in platelet count. No recent history of bleeding or thrombosis and she has received vaccination against COV 2 without thrombotic tendency underscoring the safety of vaccination.\par Her baseline functional status is without change. She has had fatigue and intermittent leg cramping and RLE swelling and poorly controlled BP.  Her PE is significant for mild ankle swelling on the right. 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  \par Plan: \par Venous Doppler bilateral LE negative\par Hypokalemia after holding spironolactone in October. Now clinically stable. She has not followed up with Dr Jay this January. \par CBC and BCR ABL requested 2/2/2022 as her daughter is driving into office for her appointment\par RTC 3 months PRE V\par 06/08/2022 - Patient feels well. Her blood pressure was high today - she did not take her am blood pressure medications as she takes it with food and did not eat breakfast as she was getting her blood work done. \par Patient advised to take her blood pressure medication as prescribed and advised not to skip doses. \par Will repeat CBC, CMP and BCR/ ABL levels today. \par Her Chronic Myeloid Leukemia is well controlled on current therapy, will continue Nilotinib 150 mg PO capsules - 2 capsules in am & 1 in pm. \par Medication & Physician follow up compliance emphasized. Patient verbalized understanding, all questions, concerns were addressed during this visit.\par Patient was seen, examined and evaluated with Dr Rios Orellana.\par 07/20/2022 Discussion with Young; she has been missing dosing of the Tasigna; She has missed as much as one month of medication. At times she forgets or feels tired to take medication now after ten years. One prior relapse after 2 years of treatment (eight years ago). She denies depression and has blanca in caring for her grand daughter. 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 CBC and WBC is OK today.\par It is probable that the rise in BCR ABL reflected non use of medication rather than genetic resistance\par RTC in 6 week BCR ABL testing is requested today.\par 12/20/2022 Now 77 years of age. no hospitalzation; she has been taking medication daily. Less depression; helps care for . Has an assistant for  care\par No falls. No infection. will check BCR ABL today; last level (with missed dosing in 09/01/2022) BCR ABL 0.4% Renewal of Tasigna\par RTC 3 months

## 2022-12-21 ENCOUNTER — APPOINTMENT (OUTPATIENT)
Dept: INTERNAL MEDICINE | Facility: CLINIC | Age: 77
End: 2022-12-21

## 2022-12-21 VITALS
HEIGHT: 62 IN | DIASTOLIC BLOOD PRESSURE: 60 MMHG | BODY MASS INDEX: 27.6 KG/M2 | WEIGHT: 150 LBS | SYSTOLIC BLOOD PRESSURE: 130 MMHG

## 2022-12-21 VITALS — DIASTOLIC BLOOD PRESSURE: 82 MMHG | SYSTOLIC BLOOD PRESSURE: 124 MMHG

## 2022-12-21 DIAGNOSIS — R93.2 ABNORMAL FINDINGS ON DIAGNOSTIC IMAGING OF LIVER AND BILIARY TRACT: ICD-10-CM

## 2022-12-21 PROCEDURE — 99214 OFFICE O/P EST MOD 30 MIN: CPT

## 2022-12-21 NOTE — PHYSICAL EXAM
[No Acute Distress] : no acute distress [Well-Appearing] : well-appearing [Normal Sclera/Conjunctiva] : normal sclera/conjunctiva [No Respiratory Distress] : no respiratory distress  [Clear to Auscultation] : lungs were clear to auscultation bilaterally [Normal Rate] : normal rate  [Regular Rhythm] : with a regular rhythm [No Edema] : there was no peripheral edema [Soft] : abdomen soft [Non Tender] : non-tender [Normal Bowel Sounds] : normal bowel sounds [Normal Anterior Cervical Nodes] : no anterior cervical lymphadenopathy

## 2022-12-23 ENCOUNTER — NON-APPOINTMENT (OUTPATIENT)
Age: 77
End: 2022-12-23

## 2022-12-23 LAB — T(9;22)(ABL1,BCR)/CONTROL BLD/T: NORMAL

## 2022-12-28 NOTE — ASSESSMENT
[FreeTextEntry1] : 75 yo F with h/o HTN, HLD (briefly took rosuvastatin and then stopped), CML (dx 2012)\par \par \par Interval hx: since last visit has seen Derm, Ophtho, Hep, and heme/onc. Notes reviewed. W/u in progress for both RE rash: s/p bx - path pending\par RE US s/o cirrhosis: Seen by hepatology and w/u in progress - extensive bloodwork done - reviewed at visit - no glaring abnormalities however she will also review at f/u with Dr Gore. has MRI abdomen and Fibroscan pending.\par RE HTN: last visit elevated - to bring in BP log for review. Brought in BP machine and quite accurate when tested wrt manual BP here in office. Reports she is taking all her BP medications as prescribed and todays BP in target range. She will continue BP meds. \par RE HLD: Also taking Rosuvastatin daily.  will check with Hep to ensure ok to continue. As per Ms D - all meds reviewed at hepatology visit and was not advised to d/c statin but will reach out to be sure.

## 2022-12-28 NOTE — HISTORY OF PRESENT ILLNESS
[de-identified] : 77 yo F with h/o HTN, HLD (briefly took rosuvastatin and then stopped), CML (dx 2012)\par \par Chart reviewed today in preparation for visit. \par Interval hx: since last visit has seen Derm, Ophtho, Hep, and heme/onc. Notes reviewed. W/u in progress for both rash and US s/o cirrhosis. Has Fibroscan pending and path from bx of rash. \par RE HTN: last visit elevated - to bring in BP log for review\par Reports she is taking all her BP medications as prescribed. Also taking Rosuvastatin daily.  Says she feels a lot better now that she is taking all the meds and is motivated to continue. \par \par Prior 11/2/22\par She is taking, statin, amlodipine, spironolactone, and b-blocker. \par using diclofenac on the rash which helps cool it off but has otherwise not helped. \par \par  RE Rash:  has had rash for about 2-3 weeks - now in drying phase. not painful, slight itch - not much. unclear etiology - referring to derm. doubt related to meds as has happened before when she was not on new meds. Advised stop using diclofenac on it. \par Abnormal liver US with finding of cirrhosis: SW to assist with scheduling hepatology appt as she has been having difficulty.  sending for fibroscan in the interim. \par HTN: no change despite taking all her meds. f/u with cards. ? reactive HTN? says BP's OK at home. bring in log and machine to next visit. Decrease Na. \par HLD: finally taking statin - check labs

## 2023-02-16 ENCOUNTER — APPOINTMENT (OUTPATIENT)
Dept: DERMATOLOGY | Facility: CLINIC | Age: 78
End: 2023-02-16
Payer: MEDICARE

## 2023-02-16 VITALS — BODY MASS INDEX: 27.6 KG/M2 | WEIGHT: 150 LBS | HEIGHT: 62 IN

## 2023-02-16 DIAGNOSIS — L30.9 DERMATITIS, UNSPECIFIED: ICD-10-CM

## 2023-02-16 DIAGNOSIS — L85.3 XEROSIS CUTIS: ICD-10-CM

## 2023-02-16 PROCEDURE — 99214 OFFICE O/P EST MOD 30 MIN: CPT

## 2023-03-16 ENCOUNTER — OUTPATIENT (OUTPATIENT)
Dept: OUTPATIENT SERVICES | Facility: HOSPITAL | Age: 78
LOS: 1 days | Discharge: HOME | End: 2023-03-16

## 2023-03-16 DIAGNOSIS — C93.10 CHRONIC MYELOMONOCYTIC LEUKEMIA NOT HAVING ACHIEVED REMISSION: ICD-10-CM

## 2023-03-16 DIAGNOSIS — Z98.41 CATARACT EXTRACTION STATUS, RIGHT EYE: Chronic | ICD-10-CM

## 2023-03-21 ENCOUNTER — APPOINTMENT (OUTPATIENT)
Dept: HEMATOLOGY ONCOLOGY | Facility: CLINIC | Age: 78
End: 2023-03-21
Payer: MEDICARE

## 2023-03-21 ENCOUNTER — RESULT REVIEW (OUTPATIENT)
Age: 78
End: 2023-03-21

## 2023-03-21 VITALS
WEIGHT: 157.41 LBS | HEART RATE: 74 BPM | OXYGEN SATURATION: 99 % | SYSTOLIC BLOOD PRESSURE: 159 MMHG | DIASTOLIC BLOOD PRESSURE: 88 MMHG | RESPIRATION RATE: 16 BRPM | BODY MASS INDEX: 28.79 KG/M2 | TEMPERATURE: 97 F

## 2023-03-21 LAB
ALBUMIN SERPL ELPH-MCNC: 4.3 G/DL
ALP BLD-CCNC: 58 U/L
ALT SERPL-CCNC: 14 U/L
ANION GAP SERPL CALC-SCNC: 13 MMOL/L
AST SERPL-CCNC: 14 U/L
BASOPHILS # BLD AUTO: 0.04 K/UL — SIGNIFICANT CHANGE UP (ref 0–0.2)
BASOPHILS NFR BLD AUTO: 0.8 % — SIGNIFICANT CHANGE UP (ref 0–2)
BILIRUB SERPL-MCNC: 0.8 MG/DL
BUN SERPL-MCNC: 9 MG/DL
CALCIUM SERPL-MCNC: 9 MG/DL
CHLORIDE SERPL-SCNC: 104 MMOL/L
CO2 SERPL-SCNC: 24 MMOL/L
CREAT SERPL-MCNC: 0.73 MG/DL
EGFR: 84 ML/MIN/1.73M2
EOSINOPHIL # BLD AUTO: 0.07 K/UL — SIGNIFICANT CHANGE UP (ref 0–0.5)
EOSINOPHIL NFR BLD AUTO: 1.5 % — SIGNIFICANT CHANGE UP (ref 0–6)
GLUCOSE SERPL-MCNC: 118 MG/DL
HCT VFR BLD CALC: 40.1 % — SIGNIFICANT CHANGE UP (ref 34.5–45)
HGB BLD-MCNC: 12.9 G/DL — SIGNIFICANT CHANGE UP (ref 11.5–15.5)
IMM GRANULOCYTES NFR BLD AUTO: 0.2 % — SIGNIFICANT CHANGE UP (ref 0–0.9)
LYMPHOCYTES # BLD AUTO: 1.51 K/UL — SIGNIFICANT CHANGE UP (ref 1–3.3)
LYMPHOCYTES # BLD AUTO: 32.1 % — SIGNIFICANT CHANGE UP (ref 13–44)
MCHC RBC-ENTMCNC: 28.5 PG — SIGNIFICANT CHANGE UP (ref 27–34)
MCHC RBC-ENTMCNC: 32.2 G/DL — SIGNIFICANT CHANGE UP (ref 32–36)
MCV RBC AUTO: 88.5 FL — SIGNIFICANT CHANGE UP (ref 80–100)
MONOCYTES # BLD AUTO: 0.41 K/UL — SIGNIFICANT CHANGE UP (ref 0–0.9)
MONOCYTES NFR BLD AUTO: 8.7 % — SIGNIFICANT CHANGE UP (ref 2–14)
NEUTROPHILS # BLD AUTO: 2.67 K/UL — SIGNIFICANT CHANGE UP (ref 1.8–7.4)
NEUTROPHILS NFR BLD AUTO: 56.7 % — SIGNIFICANT CHANGE UP (ref 43–77)
NRBC # BLD: 0 /100 WBCS — SIGNIFICANT CHANGE UP (ref 0–0)
PLATELET # BLD AUTO: 195 K/UL — SIGNIFICANT CHANGE UP (ref 150–400)
POTASSIUM SERPL-SCNC: 4 MMOL/L
PROT SERPL-MCNC: 7.1 G/DL
RBC # BLD: 4.53 M/UL — SIGNIFICANT CHANGE UP (ref 3.8–5.2)
RBC # FLD: 13.9 % — SIGNIFICANT CHANGE UP (ref 10.3–14.5)
SODIUM SERPL-SCNC: 141 MMOL/L
WBC # BLD: 4.71 K/UL — SIGNIFICANT CHANGE UP (ref 3.8–10.5)
WBC # FLD AUTO: 4.71 K/UL — SIGNIFICANT CHANGE UP (ref 3.8–10.5)

## 2023-03-21 PROCEDURE — 99214 OFFICE O/P EST MOD 30 MIN: CPT

## 2023-03-23 NOTE — REASON FOR VISIT
[Follow-Up Visit] : a follow-up visit for [Blood Count Assessment] : blood count assessment [Chronic Leukemia] : chronic leukemia [Family Member] : family member [Other: _____] : [unfilled] [FreeTextEntry2] : 3 month evaluation for chronic myelogenous leukemia

## 2023-03-23 NOTE — RESULTS/DATA
[FreeTextEntry1] : review of blood testing from 10/2021; normal CBC \par BCR/ABL less than 0.40 log\par 06/08/2022: CBC WBC = 5.86 Hgb 12.8  HCT = 40.2  PLT [id number], MCV; normal differential WBC\par BCR ABL 4% in contrast to 0.004 in 10/21\par 07/20/2022: CBC: WBC 5.83 HGB 12.3 PL [id number]\par 09/01/2022 BCR ABL positive 0.4 log\par 12/20/2022 CBC WBC 6.37 HGB 12.9  000\par 12/20/2023 BCR ABL < 0.03 log \par 03/21/2023 CBC WBC 4.71 HGB 12.9 MCV 88  000\par

## 2023-03-23 NOTE — HISTORY OF PRESENT ILLNESS
[Disease:__________________________] : Disease: [unfilled] [Treatment Protocol] : Treatment Protocol [0 - No Distress] : Distress Level: 0 [100: Normal, no complaints, no evidence of disease.] : 100: Normal, no complaints, no evidence of disease. [ECOG Performance Status: 0 - Fully active, able to carry on all pre-disease performance without restriction] : Performance Status: 0 - Fully active, able to carry on all pre-disease performance without restriction [Date: ____________] : Patient's last distress assessment performed on [unfilled]. [de-identified] : 77 year old female with past medical history of hypertension presenting to [facility] for hematologic care. Patient was initially diagnosed with CML in April 2012. She was admitted to [facility] and was seen by Dr Bland of the transfusion service who performed several pheresis procedures to reduce a high platelet count (approximately 3,000,000). She was subsequently started on treatment with imatinib 400 mg PO daily and she was in compliance with treatment for three years. She discontinued treatment on her volition in early 2015. \par \par On March 2015, she was reportedly feeling more tired with anorexia weight loss and presented at PMD with thrombocytosis (2.1 million platelet count). She was admitted with platelet count of 2,088K and underwent multiple cycles of plasmaphereses with improvement of thrombocytosis and begun on nilotinib and hydroxyurea. She stated at the time of admission that she stopped taking Gleevec as well as other antihypertensives as she felt she did not need them. Her hospital course was complicated by herpes zoster with resolution on Valtrex as well as APC's which resolved with metoprolol. She was ultimately discharged on 4/17/2015. She has tolerated the nilotinib without side effects. [FreeTextEntry1] : nilotinib 150 mg tablets PO 2 tablet in AM, 1 tablet in PM [de-identified] : feels well; she is fully vaccinated against COV 2 and has had booster. No symptoms of infection. She is residing at home\par 06/08/2022 - Patient was seen in a follow up visit accompanied by her granddaughter Shellie. \par Patient feels well denies any weight loss, fever, chills, nausea, vomiting, headaches, visual disturbances, shortness of breath, abdominal pain, constipation, diarrhea, bleeding, bruising. Since her last visit with us she denies any COVID infection, hospitalization or surgeries. She has received Pfizer vaccination for COVID-19 including booster doses. \par She was having chronic Right foot and ankle pain and Left knee pain was seen by Orthopedics today, reports no fractures will be treated medically. \par She denies any falls, gait instability, use of any assist devices to ambulate. \par Today on routine vitals in our office her BP was high 157/ 91 mmHg pt asymptomatic. States she takes her medications with breakfast and this morning did not eat or take any medications as she had an appointment for her blood work.\par She has been compliant with her medications and her physician follow ups.  \par She lives with her  and other family members. She is able to carry out her household tasks cooking, cleaning without any difficulties. \par 07/20/2022 No change from above recent activities and no weight loss and no fever. She tells me that she has been missing dosing of medication doses including one full month of no therapy\par 12/20/2022 she has told me that she had been taking medication daily and has not skipped dosing. Normal CBC today. No falls and no hospitalzation. She now has six great grand children. She is seeing Dr Peters She has help with her  for home care

## 2023-03-23 NOTE — REVIEW OF SYSTEMS
[Incontinence] : incontinence [Joint Pain] : joint pain [Insomnia] : insomnia [Negative] : Allergic/Immunologic [Fever] : no fever [Chills] : no chills [Night Sweats] : no night sweats [Fatigue] : no fatigue [Recent Change In Weight] : ~T no recent weight change [Eye Pain] : no eye pain [Red Eyes] : eyes not red [Dry Eyes] : no dryness of the eyes [Vision Problems] : no vision problems [Dysphagia] : no dysphagia [Loss of Hearing] : no loss of hearing [Nosebleeds] : no nosebleeds [Hoarseness] : no hoarseness [Odynophagia] : no odynophagia [Mucosal Pain] : no mucosal pain [Chest Pain] : no chest pain [Palpitations] : no palpitations [Leg Claudication] : no intermittent leg claudication [Lower Ext Edema] : no lower extremity edema [Shortness Of Breath] : no shortness of breath [Wheezing] : no wheezing [Cough] : no cough [SOB on Exertion] : no shortness of breath during exertion [Abdominal Pain] : no abdominal pain [Vomiting] : no vomiting [Constipation] : no constipation [Diarrhea] : no diarrhea [Dysuria] : no dysuria [Vaginal Discharge] : no vaginal discharge [Dysmenorrhea/Abn Vaginal Bleeding] : no dysmenorrhea/abnormal vaginal bleeding [Joint Stiffness] : no joint stiffness [Skin Rash] : no skin rash [Skin Wound] : no skin wound [Confused] : no confusion [Dizziness] : no dizziness [Fainting] : no fainting [Difficulty Walking] : no difficulty walking [Suicidal] : not suicidal [Anxiety] : no anxiety [Depression] : no depression [Proptosis] : no proptosis [Hot Flashes] : no hot flashes [Muscle Weakness] : no muscle weakness [Deepening Of The Voice] : no deepening of the voice [Easy Bleeding] : no tendency for easy bleeding [Easy Bruising] : no tendency for easy bruising [Swollen Glands] : no swollen glands [FreeTextEntry3] : wears corrective lenses, has Glaucoma uses eye drops per Ophtho recommendations  [FreeTextEntry8] : wears pull ups for incontinence, denies hematuria, urgency, frequency, denies any vaginal bleeding [FreeTextEntry9] : right ankle, foot pain unrelated to trauma, Left knee pain  [de-identified] : intermittent insomnia doesn't take any medications

## 2023-03-23 NOTE — PHYSICAL EXAM
[Fully active, able to carry on all pre-disease performance without restriction] : Status 0 - Fully active, able to carry on all pre-disease performance without restriction [Normal] : affect appropriate [Ulcers] : no ulcers [Mucositis] : no mucositis [Thrush] : no thrush [Vesicles] : no vesicles [de-identified] : wears corrective lenses  [de-identified] : Right ankle swelling, no visible signs of trauma noted. no pitting edema  [de-identified] : Generalized dry skin noted  [de-identified] : no gait instability, no gross sensory / motor deficits noted

## 2023-03-23 NOTE — ASSESSMENT
[Supportive] : Goals of care discussed with patient: Supportive [Palliative Care Plan] : not applicable at this time [FreeTextEntry1] : HANG Al is a 76 year old female with a 98 month history of diagnosis and treatment of chronic myeloid leukemia; she is currently receiving treatment with nilotinib and she is tolerating therapy well. \par She remains in a hematologic complete response and a very good molecular response. No fatigue no weakness and no rise in platelet count. No recent history of bleeding or thrombosis and she has received vaccination against COV 2 without thrombotic tendency underscoring the safety of vaccination.\par Her baseline functional status is without change. She has had fatigue and intermittent leg cramping and RLE swelling and poorly controlled BP.  Her PE is significant for mild ankle swelling on the right. 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  \par Plan: \par Venous Doppler bilateral LE negative\par Hypokalemia after holding spironolactone in October. Now clinically stable. She has not followed up with Dr Jay this January. \par CBC and BCR ABL requested 2/2/2022 as her daughter is driving into office for her appointment\par RTC 3 months PRE V\par 06/08/2022 - Patient feels well. Her blood pressure was high today - she did not take her am blood pressure medications as she takes it with food and did not eat breakfast as she was getting her blood work done. \par Patient advised to take her blood pressure medication as prescribed and advised not to skip doses. \par Will repeat CBC, CMP and BCR/ ABL levels today. \par Her Chronic Myeloid Leukemia is well controlled on current therapy, will continue Nilotinib 150 mg PO capsules - 2 capsules in am & 1 in pm. \par Medication & Physician follow up compliance emphasized. Patient verbalized understanding, all questions, concerns were addressed during this visit.\par Patient was seen, examined and evaluated with Dr Rios Orellana.\par 07/20/2022 Discussion with Young; she has been missing dosing of the Tasigna; She has missed as much as one month of medication. At times she forgets or feels tired to take medication now after ten years. One prior relapse after 2 years of treatment (eight years ago). She denies depression and has blanca in caring for her grand daughter. 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 CBC and WBC is OK today.\par It is probable that the rise in BCR ABL reflected non use of medication rather than genetic resistance\par RTC in 6 week BCR ABL testing is requested today.\par 12/20/2022 Now 77 years of age. no hospitalzation; she has been taking medication daily. Less depression; helps care for . Has an assistant for  care\par No falls. No infection. will check BCR ABL today; last level (with missed dosing in 09/01/2022) BCR ABL 0.4% Renewal of Tasigna\par RTC 3 months\par 03/21/2023 she is now 11 years from diagnosis as she present with a high platelet count; now stable blood counts and unchanged physical examination; we will request CXR to monitor if there is evidence of fluid accumulation

## 2023-03-24 LAB — T(9;22)(ABL1,BCR)/CONTROL BLD/T: NORMAL

## 2023-04-17 ENCOUNTER — RESULT CHARGE (OUTPATIENT)
Age: 78
End: 2023-04-17

## 2023-04-18 PROBLEM — Z12.39 SCREENING FOR BREAST CANCER: Status: ACTIVE | Noted: 2022-02-22

## 2023-04-19 ENCOUNTER — APPOINTMENT (OUTPATIENT)
Dept: INTERNAL MEDICINE | Facility: CLINIC | Age: 78
End: 2023-04-19
Payer: MEDICARE

## 2023-04-19 ENCOUNTER — NON-APPOINTMENT (OUTPATIENT)
Age: 78
End: 2023-04-19

## 2023-04-19 VITALS
BODY MASS INDEX: 28.35 KG/M2 | DIASTOLIC BLOOD PRESSURE: 70 MMHG | HEART RATE: 81 BPM | OXYGEN SATURATION: 98 % | WEIGHT: 158 LBS | SYSTOLIC BLOOD PRESSURE: 140 MMHG | HEIGHT: 62.5 IN

## 2023-04-19 VITALS — SYSTOLIC BLOOD PRESSURE: 122 MMHG | DIASTOLIC BLOOD PRESSURE: 70 MMHG

## 2023-04-19 DIAGNOSIS — Z00.00 ENCOUNTER FOR GENERAL ADULT MEDICAL EXAMINATION W/OUT ABNORMAL FINDINGS: ICD-10-CM

## 2023-04-19 DIAGNOSIS — Z12.39 ENCOUNTER FOR OTHER SCREENING FOR MALIGNANT NEOPLASM OF BREAST: ICD-10-CM

## 2023-04-19 DIAGNOSIS — R94.31 ABNORMAL ELECTROCARDIOGRAM [ECG] [EKG]: ICD-10-CM

## 2023-04-19 DIAGNOSIS — N39.41 URGE INCONTINENCE: ICD-10-CM

## 2023-04-19 PROCEDURE — G0439: CPT

## 2023-04-19 PROCEDURE — G0442 ANNUAL ALCOHOL SCREEN 15 MIN: CPT

## 2023-04-19 PROCEDURE — 93000 ELECTROCARDIOGRAM COMPLETE: CPT | Mod: 59

## 2023-04-19 PROCEDURE — G0444 DEPRESSION SCREEN ANNUAL: CPT

## 2023-04-22 NOTE — DISCUSSION/SUMMARY
[Subsequent Annual Wellness] : Subsequent Annual Wellness Visit [Preventive Exam & Counseling Completed] : with preventive exam as well as age and risk appropriate counseling completed [EKG] : EKG recommended [Healthy Diet] : counseling was given on maintaining a healthy diet [Healthy Weight] : counseling was given on maintaining a healthy weight [Blood Glucose] : due for blood glucose [Screening Current] : screening is current [Calcium and Vitamin D Intake] : counseling was given on obtaining adequate amounts of calcium and vitamin D on a daily basis [Screening Not Indicated] : screening not indicated [Patient Declines] : the patient declines screening [Influenza Recommended Annually] : influenza vaccination is recommended annually [Complete and Up to Date] : complete and up to date [Lifetime Vaccine Completed] : the lifetime pneumococcal vaccine has been completed

## 2023-04-22 NOTE — HISTORY OF PRESENT ILLNESS
[de-identified] : 77 yo F with h/o HTN, HLD (briefly took rosuvastatin and then stopped), CML (dx 2012)\par \par Here with her daughter. Reviewed meds: still not taking statin - only took for a very short time. \par Has seen hepatology but has not gone back for f/u yet.  was sent for MRI and fibroscan - has not scheduled these yet. \par \par Prior 12/21/22\par RE US s/o cirrhosis: Seen by hepatology and w/u in progress - extensive bloodwork done - reviewed at visit - no glaring abnormalities however she will also review at f/u with Dr Gore. has MRI abdomen and Fibroscan pending.\par RE HTN: last visit elevated - to bring in BP log for review. Brought in BP machine and quite accurate when tested wrt manual BP here in office. Reports she is taking all her BP medications as prescribed and todays BP in target range. She will continue BP meds. \par RE HLD: Also taking Rosuvastatin daily.  will check with Hep to ensure ok to continue. As per Ms D - all meds reviewed at hepatology visit and was not advised to d/c statin but will reach out to be sure.

## 2023-04-22 NOTE — HEALTH RISK ASSESSMENT
[No Retinopathy] : No retinopathy [Patient declined colonoscopy] : Patient declined colonoscopy [2 - 4 times a month (2 pts)] : 2-4 times a month (2 points) [No falls in past year] : Patient reported no falls in the past year [Patient declined PAP Smear] : Patient declined PAP Smear [HIV test declined] : HIV test declined [Hepatitis C test declined] : Hepatitis C test declined [None] : None [With Family] : lives with family [Retired] : retired [] :  [Feels Safe at Home] : Feels safe at home [Fully functional (bathing, dressing, toileting, transferring, walking, feeding)] : Fully functional (bathing, dressing, toileting, transferring, walking, feeding) [Fully functional (using the telephone, shopping, preparing meals, housekeeping, doing laundry, using] : Fully functional and needs no help or supervision to perform IADLs (using the telephone, shopping, preparing meals, housekeeping, doing laundry, using transportation, managing medications and managing finances) [Smoke Detector] : smoke detector [Carbon Monoxide Detector] : carbon monoxide detector [Seat Belt] :  uses seat belt [Relationship: ___] : Relationship: [unfilled] [Never] : Never [Patient declined bone density test] : Patient declined bone density test [de-identified] : not much meat - lots of fruits/veggies/juicing [de-identified] : occ movies, occ dancing [EyeExamDate] : 12/22 [Change in mental status noted] : No change in mental status noted [Reports changes in hearing] : Reports no changes in hearing [Reports changes in vision] : Reports no changes in vision [Guns at Home] : no guns at home [MammogramComments] : BI-RADS 2 [MammogramDate] : 06/22 [BoneDensityDate] : 02/21 [BoneDensityComments] : declined  [AdvancecareDate] : 04/23

## 2023-04-22 NOTE — ASSESSMENT
[FreeTextEntry1] : 75 yo F with h/o HTN, HLD (briefly took rosuvastatin and then stopped), CML (dx 2012)\par \par Here with her daughter. Reviewed meds: still not taking statin - only took for a very short time. \par Has seen hepatology but has not gone back for f/u yet.  was sent for MRI and fibroscan - has not scheduled these yet. discussed importance and they will call to schedule scans along with hep f/u\par \par Annual alcohol screen completed this visit. Alcohol use within healthy limits.  Healthy drinking guidelines shared with patient ( Female and male overage 65 no more than 3 SSD on any day, no more than 7 per week). Positive reinforcement provided given patient currently within healthy guidelines. \par \par Annual depression screen completed this visit and reviewed.  Screening not suggestive of diagnosis of MDD.

## 2023-04-22 NOTE — PHYSICAL EXAM
[No Acute Distress] : no acute distress [Well-Appearing] : well-appearing [Normal Sclera/Conjunctiva] : normal sclera/conjunctiva [PERRL] : pupils equal round and reactive to light [EOMI] : extraocular movements intact [No Lymphadenopathy] : no lymphadenopathy [Supple] : supple [No Respiratory Distress] : no respiratory distress  [Normal Rate] : normal rate  [Clear to Auscultation] : lungs were clear to auscultation bilaterally [Regular Rhythm] : with a regular rhythm [Normal S1, S2] : normal S1 and S2 [Pedal Pulses Present] : the pedal pulses are present [No Edema] : there was no peripheral edema [Normal Appearance] : normal in appearance [No Masses] : no palpable masses [No Axillary Lymphadenopathy] : no axillary lymphadenopathy [Soft] : abdomen soft [Normal Bowel Sounds] : normal bowel sounds [Non Tender] : non-tender [Normal Axillary Nodes] : no axillary lymphadenopathy [Normal Anterior Cervical Nodes] : no anterior cervical lymphadenopathy [No CVA Tenderness] : no CVA  tenderness [No Spinal Tenderness] : no spinal tenderness [No Joint Swelling] : no joint swelling [Grossly Normal Strength/Tone] : grossly normal strength/tone [No Rash] : no rash [Coordination Grossly Intact] : coordination grossly intact [No Focal Deficits] : no focal deficits [Deep Tendon Reflexes (DTR)] : deep tendon reflexes were 2+ and symmetric [Normal Affect] : the affect was normal [de-identified] : no skin changes

## 2023-04-24 ENCOUNTER — APPOINTMENT (OUTPATIENT)
Dept: RADIOLOGY | Facility: IMAGING CENTER | Age: 78
End: 2023-04-24
Payer: MEDICARE

## 2023-04-24 ENCOUNTER — OUTPATIENT (OUTPATIENT)
Dept: OUTPATIENT SERVICES | Facility: HOSPITAL | Age: 78
LOS: 1 days | End: 2023-04-24
Payer: MEDICARE

## 2023-04-24 ENCOUNTER — RESULT REVIEW (OUTPATIENT)
Age: 78
End: 2023-04-24

## 2023-04-24 ENCOUNTER — APPOINTMENT (OUTPATIENT)
Dept: ULTRASOUND IMAGING | Facility: IMAGING CENTER | Age: 78
End: 2023-04-24
Payer: MEDICARE

## 2023-04-24 DIAGNOSIS — C92.10 CHRONIC MYELOID LEUKEMIA, BCR/ABL-POSITIVE, NOT HAVING ACHIEVED REMISSION: ICD-10-CM

## 2023-04-24 PROCEDURE — 71046 X-RAY EXAM CHEST 2 VIEWS: CPT | Mod: 26

## 2023-04-24 PROCEDURE — 71046 X-RAY EXAM CHEST 2 VIEWS: CPT

## 2023-04-24 PROCEDURE — 76700 US EXAM ABDOM COMPLETE: CPT | Mod: 26

## 2023-04-24 PROCEDURE — 76700 US EXAM ABDOM COMPLETE: CPT

## 2023-05-11 ENCOUNTER — NON-APPOINTMENT (OUTPATIENT)
Age: 78
End: 2023-05-11

## 2023-05-11 ENCOUNTER — APPOINTMENT (OUTPATIENT)
Dept: OPHTHALMOLOGY | Facility: CLINIC | Age: 78
End: 2023-05-11
Payer: MEDICARE

## 2023-05-11 PROCEDURE — 92012 INTRM OPH EXAM EST PATIENT: CPT

## 2023-05-11 PROCEDURE — 92020 GONIOSCOPY: CPT

## 2023-05-11 PROCEDURE — 92083 EXTENDED VISUAL FIELD XM: CPT

## 2023-05-11 PROCEDURE — 92133 CPTRZD OPH DX IMG PST SGM ON: CPT

## 2023-05-18 ENCOUNTER — APPOINTMENT (OUTPATIENT)
Dept: CT IMAGING | Facility: IMAGING CENTER | Age: 78
End: 2023-05-18
Payer: MEDICARE

## 2023-05-18 ENCOUNTER — OUTPATIENT (OUTPATIENT)
Dept: OUTPATIENT SERVICES | Facility: HOSPITAL | Age: 78
LOS: 1 days | End: 2023-05-18
Payer: MEDICARE

## 2023-05-18 DIAGNOSIS — Z00.8 ENCOUNTER FOR OTHER GENERAL EXAMINATION: ICD-10-CM

## 2023-05-18 DIAGNOSIS — Z98.41 CATARACT EXTRACTION STATUS, RIGHT EYE: Chronic | ICD-10-CM

## 2023-05-18 DIAGNOSIS — C92.10 CHRONIC MYELOID LEUKEMIA, BCR/ABL-POSITIVE, NOT HAVING ACHIEVED REMISSION: ICD-10-CM

## 2023-05-18 PROCEDURE — 71250 CT THORAX DX C-: CPT

## 2023-05-18 PROCEDURE — 71250 CT THORAX DX C-: CPT | Mod: 26

## 2023-06-26 ENCOUNTER — RESULT REVIEW (OUTPATIENT)
Age: 78
End: 2023-06-26

## 2023-06-26 ENCOUNTER — APPOINTMENT (OUTPATIENT)
Dept: MAMMOGRAPHY | Facility: IMAGING CENTER | Age: 78
End: 2023-06-26
Payer: MEDICARE

## 2023-06-26 ENCOUNTER — APPOINTMENT (OUTPATIENT)
Dept: ULTRASOUND IMAGING | Facility: IMAGING CENTER | Age: 78
End: 2023-06-26
Payer: MEDICARE

## 2023-06-26 ENCOUNTER — OUTPATIENT (OUTPATIENT)
Dept: OUTPATIENT SERVICES | Facility: HOSPITAL | Age: 78
LOS: 1 days | End: 2023-06-26
Payer: MEDICARE

## 2023-06-26 DIAGNOSIS — Z98.41 CATARACT EXTRACTION STATUS, RIGHT EYE: Chronic | ICD-10-CM

## 2023-06-26 DIAGNOSIS — Z00.8 ENCOUNTER FOR OTHER GENERAL EXAMINATION: ICD-10-CM

## 2023-06-26 PROCEDURE — 76641 ULTRASOUND BREAST COMPLETE: CPT | Mod: 26,50

## 2023-06-26 PROCEDURE — 77063 BREAST TOMOSYNTHESIS BI: CPT | Mod: 26

## 2023-06-26 PROCEDURE — 77067 SCR MAMMO BI INCL CAD: CPT

## 2023-06-26 PROCEDURE — 77063 BREAST TOMOSYNTHESIS BI: CPT

## 2023-06-26 PROCEDURE — 77067 SCR MAMMO BI INCL CAD: CPT | Mod: 26

## 2023-06-26 PROCEDURE — 76641 ULTRASOUND BREAST COMPLETE: CPT

## 2023-07-19 ENCOUNTER — APPOINTMENT (OUTPATIENT)
Dept: INTERNAL MEDICINE | Facility: CLINIC | Age: 78
End: 2023-07-19
Payer: MEDICARE

## 2023-07-19 VITALS
HEIGHT: 72 IN | WEIGHT: 160 LBS | OXYGEN SATURATION: 98 % | DIASTOLIC BLOOD PRESSURE: 60 MMHG | HEART RATE: 63 BPM | BODY MASS INDEX: 21.67 KG/M2 | SYSTOLIC BLOOD PRESSURE: 120 MMHG

## 2023-07-19 DIAGNOSIS — M79.604 PAIN IN RIGHT LEG: ICD-10-CM

## 2023-07-19 PROCEDURE — 99214 OFFICE O/P EST MOD 30 MIN: CPT

## 2023-07-19 RX ORDER — DICLOFENAC SODIUM 1% 10 MG/G
1 GEL TOPICAL
Qty: 1 | Refills: 1 | Status: ACTIVE | COMMUNITY
Start: 2023-07-19 | End: 1900-01-01

## 2023-07-20 NOTE — PHYSICAL EXAM
[No Acute Distress] : no acute distress [Well-Appearing] : well-appearing [Normal Sclera/Conjunctiva] : normal sclera/conjunctiva [EOMI] : extraocular movements intact [No Lymphadenopathy] : no lymphadenopathy [No Respiratory Distress] : no respiratory distress  [Clear to Auscultation] : lungs were clear to auscultation bilaterally [Normal Rate] : normal rate  [Regular Rhythm] : with a regular rhythm [No Edema] : there was no peripheral edema [Soft] : abdomen soft [Non Tender] : non-tender [Normal Bowel Sounds] : normal bowel sounds [No CVA Tenderness] : no CVA  tenderness [No Spinal Tenderness] : no spinal tenderness [Grossly Normal Strength/Tone] : grossly normal strength/tone [No Rash] : no rash [Coordination Grossly Intact] : coordination grossly intact [Normal Affect] : the affect was normal [de-identified] : mildly distended

## 2023-07-20 NOTE — HISTORY OF PRESENT ILLNESS
[de-identified] : 77 yo F with h/o HTN, HLD (briefly took rosuvastatin and then stopped), CML (dx 2012)\par \par c/o right leg pain for about 1 month - not when she is sitting or lying down -tends to occur when she gets up and starts walking after sitting being sedentary for a period of time.  No weakness or numbness.  Leg does not give out underneath her.  Happens intermittently.  Not currently present.  Reports she was not able to follow-up yet with hepatology.  The office she was sent to is too far from her home.  Finds it very difficult to get transportation there.  Also called a few times to make follow-up appointment but never heard back from them.\par Wants to go someplace closer to here which is also closer to her home. \par \par RE HLD: has actually been taking statin regularly.  finally. \par \par HTN: needs medications refilled. BP in target range on current meds. \par \par Prior 4/19/23\par Here with her daughter. Reviewed meds: still not taking statin - only took for a very short time. \par Has seen hepatology but has not gone back for f/u yet.  was sent for MRI and fibroscan - has not scheduled these yet. discussed importance and they will call to schedule scans along with hep f/u\par  \par \par Prior 12/21/22\par RE US s/o cirrhosis: Seen by hepatology and w/u in progress - extensive bloodwork done - reviewed at visit - no glaring abnormalities however she will also review at f/u with Dr Gore. has MRI abdomen and Fibroscan pending.\par RE HTN: last visit elevated - to bring in BP log for review. Brought in BP machine and quite accurate when tested wrt manual BP here in office. Reports she is taking all her BP medications as prescribed and todays BP in target range. She will continue BP meds. \par RE HLD: Also taking Rosuvastatin daily.  will check with Hep to ensure ok to continue. As per Ms D - all meds reviewed at hepatology visit and was not advised to d/c statin but will reach out to be sure.

## 2023-07-20 NOTE — ASSESSMENT
[FreeTextEntry1] : 77 yo F with h/o HTN, HLD, CML (dx 2012), glaucoma, mod valvular dz on ECHO, resected hepatic adenoma ~ 2001, YAO, \par \par RE right leg pain: Intermittent for about 1 month. - tends to occur when she gets up and starts walking after sitting being sedentary for a period of time.  No weakness or numbness. \par Not currently present and physical exam unremarkable at today's visit.  Trial of topical NSAID when it bothers her. Patient advised to call for any worsening or if no resolution. \par \par RE cirrhois:  Reports she was not able to follow-up yet with hepatology.  The office she was sent to is too far from her home.  Finds it very difficult to get transportation there.  Also called a few times to make follow-up appointment but never heard back from them.\par Wants to go someplace closer to here.\par Referred back to local hepatology practice.  She will call to schedule appointment.  Advised to reach out to [place], our  if she has no left arranging an appointment.\par \par RE HLD: has actually been taking statin regularly.  finally. \par \par HTN: needs medications refilled. BP in target range on current meds.\par \par \par

## 2023-09-21 ENCOUNTER — OUTPATIENT (OUTPATIENT)
Dept: OUTPATIENT SERVICES | Facility: HOSPITAL | Age: 78
LOS: 1 days | Discharge: HOME | End: 2023-09-21

## 2023-09-21 DIAGNOSIS — Z98.41 CATARACT EXTRACTION STATUS, RIGHT EYE: Chronic | ICD-10-CM

## 2023-09-21 DIAGNOSIS — C93.10 CHRONIC MYELOMONOCYTIC LEUKEMIA NOT HAVING ACHIEVED REMISSION: ICD-10-CM

## 2023-09-27 DIAGNOSIS — R76.8 OTHER SPECIFIED ABNORMAL IMMUNOLOGICAL FINDINGS IN SERUM: ICD-10-CM

## 2023-09-29 ENCOUNTER — RESULT REVIEW (OUTPATIENT)
Age: 78
End: 2023-09-29

## 2023-09-29 ENCOUNTER — APPOINTMENT (OUTPATIENT)
Dept: HEMATOLOGY ONCOLOGY | Facility: CLINIC | Age: 78
End: 2023-09-29

## 2023-09-29 ENCOUNTER — APPOINTMENT (OUTPATIENT)
Dept: HEMATOLOGY ONCOLOGY | Facility: CLINIC | Age: 78
End: 2023-09-29
Payer: MEDICARE

## 2023-09-29 VITALS
TEMPERATURE: 97.9 F | WEIGHT: 157.26 LBS | RESPIRATION RATE: 16 BRPM | BODY MASS INDEX: 29.69 KG/M2 | HEART RATE: 76 BPM | SYSTOLIC BLOOD PRESSURE: 170 MMHG | DIASTOLIC BLOOD PRESSURE: 96 MMHG | HEIGHT: 61 IN | OXYGEN SATURATION: 100 %

## 2023-09-29 VITALS — DIASTOLIC BLOOD PRESSURE: 95 MMHG | SYSTOLIC BLOOD PRESSURE: 165 MMHG

## 2023-09-29 LAB
BASOPHILS # BLD AUTO: 0.05 K/UL — SIGNIFICANT CHANGE UP (ref 0–0.2)
BASOPHILS NFR BLD AUTO: 0.9 % — SIGNIFICANT CHANGE UP (ref 0–2)
EOSINOPHIL # BLD AUTO: 0.12 K/UL — SIGNIFICANT CHANGE UP (ref 0–0.5)
EOSINOPHIL NFR BLD AUTO: 2.1 % — SIGNIFICANT CHANGE UP (ref 0–6)
HCT VFR BLD CALC: 41 % — SIGNIFICANT CHANGE UP (ref 34.5–45)
HGB BLD-MCNC: 13.2 G/DL — SIGNIFICANT CHANGE UP (ref 11.5–15.5)
IMM GRANULOCYTES NFR BLD AUTO: 0.4 % — SIGNIFICANT CHANGE UP (ref 0–0.9)
LYMPHOCYTES # BLD AUTO: 1.73 K/UL — SIGNIFICANT CHANGE UP (ref 1–3.3)
LYMPHOCYTES # BLD AUTO: 30.9 % — SIGNIFICANT CHANGE UP (ref 13–44)
MCHC RBC-ENTMCNC: 28.2 PG — SIGNIFICANT CHANGE UP (ref 27–34)
MCHC RBC-ENTMCNC: 32.2 G/DL — SIGNIFICANT CHANGE UP (ref 32–36)
MCV RBC AUTO: 87.6 FL — SIGNIFICANT CHANGE UP (ref 80–100)
MONOCYTES # BLD AUTO: 0.42 K/UL — SIGNIFICANT CHANGE UP (ref 0–0.9)
MONOCYTES NFR BLD AUTO: 7.5 % — SIGNIFICANT CHANGE UP (ref 2–14)
NEUTROPHILS # BLD AUTO: 3.25 K/UL — SIGNIFICANT CHANGE UP (ref 1.8–7.4)
NEUTROPHILS NFR BLD AUTO: 58.2 % — SIGNIFICANT CHANGE UP (ref 43–77)
NRBC # BLD: 0 /100 WBCS — SIGNIFICANT CHANGE UP (ref 0–0)
PLATELET # BLD AUTO: 193 K/UL — SIGNIFICANT CHANGE UP (ref 150–400)
RBC # BLD: 4.68 M/UL — SIGNIFICANT CHANGE UP (ref 3.8–5.2)
RBC # FLD: 14.1 % — SIGNIFICANT CHANGE UP (ref 10.3–14.5)
WBC # BLD: 5.59 K/UL — SIGNIFICANT CHANGE UP (ref 3.8–10.5)
WBC # FLD AUTO: 5.59 K/UL — SIGNIFICANT CHANGE UP (ref 3.8–10.5)

## 2023-09-29 PROCEDURE — 99213 OFFICE O/P EST LOW 20 MIN: CPT

## 2023-10-02 LAB
ALBUMIN SERPL ELPH-MCNC: 4.5 G/DL
ALP BLD-CCNC: 58 U/L
ALT SERPL-CCNC: 13 U/L
ANION GAP SERPL CALC-SCNC: 11 MMOL/L
AST SERPL-CCNC: 13 U/L
BILIRUB SERPL-MCNC: 1.1 MG/DL
BUN SERPL-MCNC: 10 MG/DL
CALCIUM SERPL-MCNC: 9.3 MG/DL
CHLORIDE SERPL-SCNC: 103 MMOL/L
CO2 SERPL-SCNC: 27 MMOL/L
CREAT SERPL-MCNC: 0.86 MG/DL
EGFR: 69 ML/MIN/1.73M2
GLUCOSE SERPL-MCNC: 95 MG/DL
POTASSIUM SERPL-SCNC: 4.2 MMOL/L
PROT SERPL-MCNC: 7.4 G/DL
SODIUM SERPL-SCNC: 142 MMOL/L

## 2023-10-03 LAB — T(9;22)(ABL1,BCR)/CONTROL BLD/T: NORMAL

## 2023-11-09 ENCOUNTER — NON-APPOINTMENT (OUTPATIENT)
Age: 78
End: 2023-11-09

## 2023-11-09 ENCOUNTER — APPOINTMENT (OUTPATIENT)
Dept: OPHTHALMOLOGY | Facility: CLINIC | Age: 78
End: 2023-11-09
Payer: MEDICARE

## 2023-11-09 PROCEDURE — 92014 COMPRE OPH EXAM EST PT 1/>: CPT

## 2023-11-09 PROCEDURE — 92250 FUNDUS PHOTOGRAPHY W/I&R: CPT

## 2023-11-21 ENCOUNTER — APPOINTMENT (OUTPATIENT)
Dept: INTERNAL MEDICINE | Facility: CLINIC | Age: 78
End: 2023-11-21

## 2023-11-21 DIAGNOSIS — R73.03 PREDIABETES.: ICD-10-CM

## 2023-12-24 NOTE — HISTORY OF PRESENT ILLNESS
[FreeTextEntry1] : Patient canceled appointment.  Will invalidate note [de-identified] : 77 yo F with h/o HTN, HLD (briefly took rosuvastatin and then stopped), CML (dx 2012), US identified cirrhosis (initially assessed by hep otology but follow-up has been a challenge). Chart reviewed today in preparation for visit.  Labs done this past September by Dr. Orellana -still needs if LP, TSH, and HbA1c  RE right leg pain: Identified at last visit.  S/p trial of topical NSAIDS Re: Cirrhosis, plan at last visit was for her to try and with local hepatology practice.  No scheduled appointment currently in the chart.  Prior 7/19/23 c/o right leg pain for about 1 month - not when she is sitting or lying down -tends to occur when she gets up and starts walking after sitting being sedentary for a period of time.  No weakness or numbness.  Leg does not give out underneath her.  Happens intermittently.  Not currently present.  Reports she was not able to follow-up yet with hepatology.  The office she was sent to is too far from her home.  Finds it very difficult to get transportation there.  Also called a few times to make follow-up appointment but never heard back from them. Wants to go someplace closer to here which is also closer to her home.   RE HLD: has actually been taking statin regularly.  finally.   HTN: needs medications refilled. BP in target range on current meds.   Prior 4/19/23 Here with her daughter. Reviewed meds: still not taking statin - only took for a very short time.  Has seen hepatology but has not gone back for f/u yet.  was sent for MRI and fibroscan - has not scheduled these yet. discussed importance and they will call to schedule scans along with hep f/u    Prior 12/21/22 RE US s/o cirrhosis: Seen by hepatology and w/u in progress - extensive bloodwork done - reviewed at visit - no glaring abnormalities however she will also review at f/u with Dr Gore. has MRI abdomen and Fibroscan pending. RE HTN: last visit elevated - to bring in BP log for review. Brought in BP machine and quite accurate when tested wrt manual BP here in office. Reports she is taking all her BP medications as prescribed and todays BP in target range. She will continue BP meds.  RE HLD: Also taking Rosuvastatin daily.  will check with Hep to ensure ok to continue. As per Ms D - all meds reviewed at hepatology visit and was not advised to d/c statin but will reach out to be sure.

## 2024-01-02 ENCOUNTER — OUTPATIENT (OUTPATIENT)
Dept: OUTPATIENT SERVICES | Facility: HOSPITAL | Age: 79
LOS: 1 days | End: 2024-01-02
Payer: MEDICARE

## 2024-01-02 ENCOUNTER — APPOINTMENT (OUTPATIENT)
Dept: INTERNAL MEDICINE | Facility: CLINIC | Age: 79
End: 2024-01-02
Payer: MEDICARE

## 2024-01-02 VITALS
DIASTOLIC BLOOD PRESSURE: 90 MMHG | HEIGHT: 61 IN | WEIGHT: 151 LBS | BODY MASS INDEX: 28.51 KG/M2 | SYSTOLIC BLOOD PRESSURE: 180 MMHG | OXYGEN SATURATION: 97 % | HEART RATE: 83 BPM

## 2024-01-02 DIAGNOSIS — K74.60 UNSPECIFIED CIRRHOSIS OF LIVER: ICD-10-CM

## 2024-01-02 DIAGNOSIS — E78.5 HYPERLIPIDEMIA, UNSPECIFIED: ICD-10-CM

## 2024-01-02 DIAGNOSIS — I10 ESSENTIAL (PRIMARY) HYPERTENSION: ICD-10-CM

## 2024-01-02 DIAGNOSIS — I27.20 PULMONARY HYPERTENSION, UNSPECIFIED: ICD-10-CM

## 2024-01-02 DIAGNOSIS — Z98.41 CATARACT EXTRACTION STATUS, RIGHT EYE: Chronic | ICD-10-CM

## 2024-01-02 DIAGNOSIS — C92.10 CHRONIC MYELOID LEUKEMIA, BCR/ABL-POSITIVE, NOT HAVING ACHIEVED REMISSION: ICD-10-CM

## 2024-01-02 DIAGNOSIS — D47.3 ESSENTIAL (HEMORRHAGIC) THROMBOCYTHEMIA: ICD-10-CM

## 2024-01-02 DIAGNOSIS — F33.8 OTHER RECURRENT DEPRESSIVE DISORDERS: ICD-10-CM

## 2024-01-02 PROCEDURE — G0463: CPT

## 2024-01-02 PROCEDURE — 99215 OFFICE O/P EST HI 40 MIN: CPT

## 2024-01-06 PROBLEM — F33.8 SEASONAL AFFECTIVE DISORDER: Status: ACTIVE | Noted: 2019-11-25

## 2024-01-06 RX ORDER — CARVEDILOL 25 MG/1
25 TABLET, FILM COATED ORAL
Qty: 180 | Refills: 3 | Status: ACTIVE | COMMUNITY
Start: 2021-01-20 | End: 1900-01-01

## 2024-01-06 RX ORDER — ROSUVASTATIN CALCIUM 20 MG/1
20 TABLET, FILM COATED ORAL DAILY
Qty: 90 | Refills: 3 | Status: ACTIVE | COMMUNITY
Start: 2021-01-20 | End: 1900-01-01

## 2024-01-06 NOTE — ADDENDUM
[FreeTextEntry1] : 45 minutes spent in preparation of this visit, including review of previous notes and test results, interview and examination of patient, discussion of plan, arranging for appropriate testing and treatment, and documentation.

## 2024-01-06 NOTE — ASSESSMENT
[FreeTextEntry1] : 75 yo F with h/o HTN, HLD (briefly took rosuvastatin and then stopped), CML (dx 2012), US identified cirrhosis (initially assessed by hep otology but follow-up has been a challenge). Chart reviewed today in preparation for visit.  RE BP: She reports [facility] has not been getting her BP meds - she has been out of all her BP meds for some time.  Says she went to the pharmacy, and they said they do not have her meds. Says they told her she has to go back to her MD.   no chest pain, headache, visual changes, or shortness of breath. Blood pressure today significantly elevated as off all medications.  No evidence for acute end organ complications. Called her pharmacy to further clarify.  They report the last time medications were picked up was in August. Resending all of her medications.  She will restart them all and then schedule a f/u appointment to recheck her blood pressure and adjust medications as needed.  RE liver: cirrhosis found on US in 12/21/22 - connecting her to care with hepatologist has been very challenging - barriers include scarcity of specialists with availability and difficulty with transportation if offices too far from her home in [place].  Ms D reports hepatology did call her for an appt - she lost the referral and was told she needs another and then can make the appt.  Will refer back to Hep - meanwhile to get fibroscan. Rx provided and contact information on where to schedule appointment given as well.  She knows to avoid alcohol and any hepatotoxic medications.  SDH: Very stressed as  is not doing well - he had CVA and MI - now with left hemiparesis and unable to walk. He has Medicare health ins so they have no coverage for assistance at home and she is unable to manage him on her own.  Situation leaves her overwhelmed with caring for 's medical needs and regarding her own health care, is often neglecting self-care and taking care of her  instead, seems to have some limitations in health literacy, and limited transportation options. Husbands used to drive her to all her medical appointments, but he is now debilitated and unable to care for himself.  She is trying to get HHA for him but falls through the cracks as not eligible for medicaid. Someone from her [employer] drove her to today's appt - she is dependent on that to get to MD appts for herself.   Spoke with SW - May be able to assist with transportation needs. may also be able to help with some logistics.  Labs done 3 months ago by Onc - normal LFT's, normal CBC F/U in 2 months.  no

## 2024-01-06 NOTE — PHYSICAL EXAM
[No Acute Distress] : no acute distress [Normal Sclera/Conjunctiva] : normal sclera/conjunctiva [No Lymphadenopathy] : no lymphadenopathy [No Respiratory Distress] : no respiratory distress  [Clear to Auscultation] : lungs were clear to auscultation bilaterally [Normal Rate] : normal rate  [Regular Rhythm] : with a regular rhythm [Normal S1, S2] : normal S1 and S2 [No Edema] : there was no peripheral edema [Non Tender] : non-tender [Soft] : abdomen soft [No HSM] : no HSM [Normal Bowel Sounds] : normal bowel sounds [No CVA Tenderness] : no CVA  tenderness [No Spinal Tenderness] : no spinal tenderness [Grossly Normal Strength/Tone] : grossly normal strength/tone [No Rash] : no rash [Coordination Grossly Intact] : coordination grossly intact [Normal Affect] : the affect was normal

## 2024-01-06 NOTE — HISTORY OF PRESENT ILLNESS
[FreeTextEntry1] : uncontrolled blood pressure Recently dx cirrhosis - difficulty accessing care - multiple barriers to connecting her to care with hepatology for her cirrhosis.  [de-identified] : 77 yo F with h/o HTN, HLD (briefly took rosuvastatin and then stopped), CML (dx 2012), US identified cirrhosis (initially assessed by hep otology but follow-up has been a challenge).  RE BP: [facility] reports [facility] - she has been out of all her BP meds for some time.  Says she went to the pharmacy, and they said they do not have her meds. Says they told her she has to go back to her MD.   no chest pain, headache, visual changes, or shortness of breath.  RE liver: cirrhosis found on US in 12/21/22 - connecting her to care with hepatologist has been very challenging - barriers include scarcity of specialists with availability and difficulty with transportation if offices too far from her home in [place].  Ms LIZBETH reports hepatology did call her for an appt - she lost the referral and was told she needs another and then can make the appt.   Very stressed as  is not doing well - he had CVA and MI - now with left hemiparesis and unable to walk. He has Medicare health ins so they have no coverage for assistance at home and she is unable to manage him on her own.   Chart reviewed today in preparation for visit.  Labs done 3 months ago by Onc - normal LFT's, normal CBC  Prior 7/19/23 Labs done this past September by Dr. Orellana -still needs if LP, TSH, and HbA1c  RE right leg pain: Identified at last visit.  S/p trial of topical NSAIDS Re: Cirrhosis, plan at last visit was for her to try and with local hepatology practice.  No scheduled appointment currently in the chart.  Prior 7/19/23 c/o right leg pain for about 1 month - not when she is sitting or lying down -tends to occur when she gets up and starts walking after sitting being sedentary for a period of time.  No weakness or numbness.  Leg does not give out underneath her.  Happens intermittently.  Not currently present.  Reports she was not able to follow-up yet with hepatology.  The office she was sent to is too far from her home.  Finds it very difficult to get transportation there.  Also called a few times to make follow-up appointment but never heard back from them. Wants to go someplace closer to here which is also closer to her home.   RE HLD: has actually been taking statin regularly.  finally.   HTN: needs medications refilled. BP in target range on current meds.   Prior 4/19/23 Here with her daughter. Reviewed meds: still not taking statin - only took for a very short time.  Has seen hepatology but has not gone back for f/u yet.  was sent for MRI and fibroscan - has not scheduled these yet. discussed importance and they will call to schedule scans along with hep f/u    Prior 12/21/22 RE US s/o cirrhosis: Seen by hepatology and w/u in progress - extensive bloodwork done - reviewed at visit - no glaring abnormalities however she will also review at f/u with Dr Gore. has MRI abdomen and Fibroscan pending. RE HTN: last visit elevated - to bring in BP log for review. Brought in BP machine and quite accurate when tested wrt manual BP here in office. Reports she is taking all her BP medications as prescribed and todays BP in target range. She will continue BP meds.  RE HLD: Also taking Rosuvastatin daily.  will check with Hep to ensure ok to continue. As per Ms D - all meds reviewed at hepatology visit and was not advised to d/c statin but will reach out to be sure.

## 2024-01-22 ENCOUNTER — APPOINTMENT (OUTPATIENT)
Dept: HEPATOLOGY | Facility: CLINIC | Age: 79
End: 2024-01-22
Payer: MEDICARE

## 2024-01-22 PROCEDURE — 76981 USE PARENCHYMA: CPT

## 2024-01-23 ENCOUNTER — NON-APPOINTMENT (OUTPATIENT)
Age: 79
End: 2024-01-23

## 2024-01-23 NOTE — GOALS
[Patient] : patient [Staff: _____] : staff - [unfilled] [FreeTextEntry7] : Via phone, Practice RN discussed with patient the importance of appointing a health care proxy. Patient confirmed that she has a HCP and her daughter Domenica Al is her appointed health care proxy.  [FreeTextEntry8] : Domenica Al  [FreeTextEntry9] : daughter [de-identified] : [phone]

## 2024-01-24 ENCOUNTER — OUTPATIENT (OUTPATIENT)
Dept: OUTPATIENT SERVICES | Facility: HOSPITAL | Age: 79
LOS: 1 days | Discharge: HOME | End: 2024-01-24

## 2024-01-24 DIAGNOSIS — Z98.41 CATARACT EXTRACTION STATUS, RIGHT EYE: Chronic | ICD-10-CM

## 2024-01-24 DIAGNOSIS — C93.10 CHRONIC MYELOMONOCYTIC LEUKEMIA NOT HAVING ACHIEVED REMISSION: ICD-10-CM

## 2024-02-02 ENCOUNTER — APPOINTMENT (OUTPATIENT)
Dept: HEMATOLOGY ONCOLOGY | Facility: CLINIC | Age: 79
End: 2024-02-02

## 2024-02-13 ENCOUNTER — APPOINTMENT (OUTPATIENT)
Dept: INTERNAL MEDICINE | Facility: CLINIC | Age: 79
End: 2024-02-13
Payer: MEDICARE

## 2024-02-13 VITALS — WEIGHT: 143 LBS | BODY MASS INDEX: 27.02 KG/M2

## 2024-02-13 DIAGNOSIS — K74.60 UNSPECIFIED CIRRHOSIS OF LIVER: ICD-10-CM

## 2024-02-13 DIAGNOSIS — F32.A DEPRESSION, UNSPECIFIED: ICD-10-CM

## 2024-02-13 DIAGNOSIS — R63.0 ANOREXIA: ICD-10-CM

## 2024-02-13 DIAGNOSIS — G47.00 INSOMNIA, UNSPECIFIED: ICD-10-CM

## 2024-02-13 PROCEDURE — G2211 COMPLEX E/M VISIT ADD ON: CPT | Mod: 95

## 2024-02-13 PROCEDURE — 99214 OFFICE O/P EST MOD 30 MIN: CPT | Mod: 95

## 2024-02-13 RX ORDER — MIRTAZAPINE 15 MG/1
15 TABLET, FILM COATED ORAL
Qty: 30 | Refills: 5 | Status: ACTIVE | COMMUNITY
Start: 2024-02-13 | End: 1900-01-01

## 2024-02-13 NOTE — HISTORY OF PRESENT ILLNESS
[Home] : at home, [unfilled] , at the time of the visit. [Other Location: e.g. Home (Enter Location, City,State)___] : at [unfilled] [Family Member] : family member [Verbal consent obtained from patient] : the patient, [unfilled] [FreeTextEntry1] : BP not at goal [de-identified] : 77 yo F with h/o HTN, HLD (briefly took rosuvastatin and then stopped), CML (dx 2012), US identified cirrhosis (initially assessed by hep otology but follow-up has been a challenge).  RE HTN: Last visit - re-prescribed all her BP meds.  f/u to ensure she was able to pick them up and restart all. Also to review BP readings. Ms. Al reports she has been taking the amlodipine 10 mg every morning and spironolactone 50 mg twice daily.  Did not  and is not currently taking carvedilol which is also on her medication list. Blood pressure readings have improved significantly: 116/80 144/86 147/86  RE cirrhosis: Reports she did go to the [address] [place] [address] on [address] and get the FibroScan done.  Has not yet received results. Re poor appetite/unintentional weight: Continues to report poor appetite.  She thinks mostly driven by her 's illness and all the responsibilities she has for his care.  Feels overwhelmed and somewhat depressed.  Daughter reports when she is over, she cooks meals and make sure her mom eats, but when mom is alone, she does not have much appetite and is often skipping meals. Checked her weight during today's visit and is 143 pounds.   RE cirrhosis: last visit, rx provided for fibroscan as she has been having difficulty with hepatology appts.  Appears she has not yet had the opportunity to schedule an appt.   Prior 2/2/24 RE BP: She reports [facility] has not been getting [facility] - she has been out of all her BP meds for some time.  Says she went to the pharmacy, and they said they do not have her meds. Says they told her she has to go back to her MD.   no chest pain, headache, visual changes, or shortness of breath.  RE liver: cirrhosis found on US in 12/21/22 - connecting her to care with hepatologist has been very challenging - barriers include scarcity of specialists with availability and difficulty with transportation if offices too far from her home in [place].  Ms LIZBETH reports hepatology did call her for an appt - she lost the referral and was told she needs another and then can make the appt.   Very stressed as  is not doing well - he had CVA and MI - now with left hemiparesis and unable to walk. He has Medicare health ins so they have no coverage for assistance at home and she is unable to manage him on her own.   Chart reviewed today in preparation for visit.  Labs done 3 months ago by Onc - normal LFT's, normal CBC  Prior 7/19/23 Labs done this past September by Dr. Orellana -still needs if LP, TSH, and HbA1c  RE right leg pain: Identified at last visit.  S/p trial of topical NSAIDS Re: Cirrhosis, plan at last visit was for her to try and with local hepatology practice.  No scheduled appointment currently in the chart.  Prior 7/19/23 c/o right leg pain for about 1 month - not when she is sitting or lying down -tends to occur when she gets up and starts walking after sitting being sedentary for a period of time.  No weakness or numbness.  Leg does not give out underneath her.  Happens intermittently.  Not currently present.  Reports she was not able to follow-up yet with hepatology.  The office she was sent to is too far from her home.  Finds it very difficult to get transportation there.  Also called a few times to make follow-up appointment but never heard back from them. Wants to go someplace closer to here which is also closer to her home.   RE HLD: has actually been taking statin regularly.  finally.   HTN: needs medications refilled. BP in target range on current meds.   Prior 4/19/23 Here with her daughter. Reviewed meds: still not taking statin - only took for a very short time.  Has seen hepatology but has not gone back for f/u yet.  was sent for MRI and fibroscan - has not scheduled these yet. discussed importance and they will call to schedule scans along with hep f/u    Prior 12/21/22 RE US s/o cirrhosis: Seen by hepatology and w/u in progress - extensive bloodwork done - reviewed at visit - no glaring abnormalities however she will also review at f/u with Dr Gore. has MRI abdomen and Fibroscan pending. RE HTN: last visit elevated - to bring in BP log for review. Brought in BP machine and quite accurate when tested wrt manual BP here in office. Reports she is taking all her BP medications as prescribed and todays BP in target range. She will continue BP meds.  RE HLD: Also taking Rosuvastatin daily.  will check with Hep to ensure ok to continue. As per Ms D - all meds reviewed at hepatology visit and was not advised to d/c statin but will reach out to be sure.

## 2024-02-13 NOTE — PHYSICAL EXAM
[No Acute Distress] : no acute distress [Normal Sclera/Conjunctiva] : normal sclera/conjunctiva [EOMI] : extraocular movements intact [No Respiratory Distress] : no respiratory distress  [No Focal Deficits] : no focal deficits [Normal Affect] : the affect was normal

## 2024-02-13 NOTE — ASSESSMENT
[FreeTextEntry1] : 75 yo F with h/o HTN, HLD (briefly took rosuvastatin and then stopped), CML (dx 2012), US identified cirrhosis (initially assessed by hep otology but follow-up has been a challenge).  RE HTN: Last visit - re-prescribed all her BP meds.  f/u to ensure she was able to pick them up and restart all. Also to review BP readings. Ms. Al reports she has been taking the amlodipine 10 mg every morning and spironolactone 50 mg twice daily.  Did not  and is not currently taking carvedilol which is also on her medication list. Blood pressure readings have improved significantly: 116/80 144/86 147/86 No change in Meds for now - continue to monitor. further titration in reserve if needed.   RE cirrhosis: s/p FibroScan done.  Found results in chart and reviewed details with Ms NIEVES and family.  F0 - no fibrosis.  Re poor appetite/unintentional weight: Checked her weight during today's visit and is 143 pounds.  Continues to report poor appetite.  She thinks mostly driven by her 's illness and all the responsibilities she has for his care.  Feels overwhelmed and somewhat depressed.  Daughter reports when she is over, she cooks meals and make sure her mom eats, but when mom is alone, she does not have much appetite and is often skipping meals. Discussed options - strategies to increase daily caloric intake. Also discussed option of trial of mirtazapine to address depressive sx, insomnia, and improve appetite. Risk v benefit reviewed in detail - she elects to try.  Will send to her pharmacy. Begin 1/2 pill at bedtime for 4 days - then as long as no side effects, increase to full pill. Warned her she may experience some AM grogginess. She knows to call for any side effects.  Short interval f/u.

## 2024-04-08 ENCOUNTER — OUTPATIENT (OUTPATIENT)
Dept: OUTPATIENT SERVICES | Facility: HOSPITAL | Age: 79
LOS: 1 days | Discharge: HOME | End: 2024-04-08

## 2024-04-08 DIAGNOSIS — Z98.41 CATARACT EXTRACTION STATUS, RIGHT EYE: Chronic | ICD-10-CM

## 2024-04-08 DIAGNOSIS — C93.10 CHRONIC MYELOMONOCYTIC LEUKEMIA NOT HAVING ACHIEVED REMISSION: ICD-10-CM

## 2024-04-16 ENCOUNTER — NON-APPOINTMENT (OUTPATIENT)
Age: 79
End: 2024-04-16

## 2024-04-17 ENCOUNTER — APPOINTMENT (OUTPATIENT)
Dept: HEMATOLOGY ONCOLOGY | Facility: CLINIC | Age: 79
End: 2024-04-17
Payer: MEDICARE

## 2024-04-17 ENCOUNTER — RESULT REVIEW (OUTPATIENT)
Age: 79
End: 2024-04-17

## 2024-04-17 VITALS
OXYGEN SATURATION: 97 % | DIASTOLIC BLOOD PRESSURE: 91 MMHG | RESPIRATION RATE: 16 BRPM | HEIGHT: 61 IN | HEART RATE: 75 BPM | BODY MASS INDEX: 28.48 KG/M2 | SYSTOLIC BLOOD PRESSURE: 165 MMHG | TEMPERATURE: 98.5 F | WEIGHT: 150.86 LBS

## 2024-04-17 DIAGNOSIS — C92.10 CHRONIC MYELOID LEUKEMIA, BCR/ABL-POSITIVE, NOT HAVING ACHIEVED REMISSION: ICD-10-CM

## 2024-04-17 DIAGNOSIS — D47.3 ESSENTIAL (HEMORRHAGIC) THROMBOCYTHEMIA: ICD-10-CM

## 2024-04-17 LAB
ALBUMIN SERPL ELPH-MCNC: 4.5 G/DL
ALP BLD-CCNC: 64 U/L
ALT SERPL-CCNC: 13 U/L
ANION GAP SERPL CALC-SCNC: 11 MMOL/L
AST SERPL-CCNC: 14 U/L
BASOPHILS # BLD AUTO: 0.05 K/UL — SIGNIFICANT CHANGE UP (ref 0–0.2)
BASOPHILS NFR BLD AUTO: 1 % — SIGNIFICANT CHANGE UP (ref 0–2)
BILIRUB SERPL-MCNC: 0.7 MG/DL
BUN SERPL-MCNC: 10 MG/DL
CALCIUM SERPL-MCNC: 9.5 MG/DL
CHLORIDE SERPL-SCNC: 101 MMOL/L
CO2 SERPL-SCNC: 27 MMOL/L
CREAT SERPL-MCNC: 0.76 MG/DL
EGFR: 80 ML/MIN/1.73M2
EOSINOPHIL # BLD AUTO: 0.1 K/UL — SIGNIFICANT CHANGE UP (ref 0–0.5)
EOSINOPHIL NFR BLD AUTO: 1.9 % — SIGNIFICANT CHANGE UP (ref 0–6)
GLUCOSE SERPL-MCNC: 87 MG/DL
HCT VFR BLD CALC: 39.8 % — SIGNIFICANT CHANGE UP (ref 34.5–45)
HGB BLD-MCNC: 12.9 G/DL — SIGNIFICANT CHANGE UP (ref 11.5–15.5)
IMM GRANULOCYTES NFR BLD AUTO: 0.2 % — SIGNIFICANT CHANGE UP (ref 0–0.9)
LYMPHOCYTES # BLD AUTO: 1.99 K/UL — SIGNIFICANT CHANGE UP (ref 1–3.3)
LYMPHOCYTES # BLD AUTO: 38.1 % — SIGNIFICANT CHANGE UP (ref 13–44)
MCHC RBC-ENTMCNC: 28.9 PG — SIGNIFICANT CHANGE UP (ref 27–34)
MCHC RBC-ENTMCNC: 32.4 G/DL — SIGNIFICANT CHANGE UP (ref 32–36)
MCV RBC AUTO: 89 FL — SIGNIFICANT CHANGE UP (ref 80–100)
MONOCYTES # BLD AUTO: 0.39 K/UL — SIGNIFICANT CHANGE UP (ref 0–0.9)
MONOCYTES NFR BLD AUTO: 7.5 % — SIGNIFICANT CHANGE UP (ref 2–14)
NEUTROPHILS # BLD AUTO: 2.68 K/UL — SIGNIFICANT CHANGE UP (ref 1.8–7.4)
NEUTROPHILS NFR BLD AUTO: 51.3 % — SIGNIFICANT CHANGE UP (ref 43–77)
NRBC # BLD: 0 /100 WBCS — SIGNIFICANT CHANGE UP (ref 0–0)
PLATELET # BLD AUTO: 204 K/UL — SIGNIFICANT CHANGE UP (ref 150–400)
POTASSIUM SERPL-SCNC: 3.6 MMOL/L
PROT SERPL-MCNC: 7.2 G/DL
RBC # BLD: 4.47 M/UL — SIGNIFICANT CHANGE UP (ref 3.8–5.2)
RBC # FLD: 14.6 % — HIGH (ref 10.3–14.5)
SODIUM SERPL-SCNC: 139 MMOL/L
WBC # BLD: 5.22 K/UL — SIGNIFICANT CHANGE UP (ref 3.8–10.5)
WBC # FLD AUTO: 5.22 K/UL — SIGNIFICANT CHANGE UP (ref 3.8–10.5)

## 2024-04-17 PROCEDURE — G2211 COMPLEX E/M VISIT ADD ON: CPT

## 2024-04-17 PROCEDURE — 99215 OFFICE O/P EST HI 40 MIN: CPT

## 2024-04-17 NOTE — REVIEW OF SYSTEMS
[Incontinence] : incontinence [Joint Pain] : joint pain [Insomnia] : insomnia [Negative] : Allergic/Immunologic [Fever] : no fever [Chills] : no chills [Night Sweats] : no night sweats [Fatigue] : no fatigue [Recent Change In Weight] : ~T no recent weight change [Eye Pain] : no eye pain [Red Eyes] : eyes not red [Dry Eyes] : no dryness of the eyes [Vision Problems] : no vision problems [Dysphagia] : no dysphagia [Loss of Hearing] : no loss of hearing [Nosebleeds] : no nosebleeds [Hoarseness] : no hoarseness [Odynophagia] : no odynophagia [Mucosal Pain] : no mucosal pain [Chest Pain] : no chest pain [Palpitations] : no palpitations [Leg Claudication] : no intermittent leg claudication [Lower Ext Edema] : no lower extremity edema [Shortness Of Breath] : no shortness of breath [Wheezing] : no wheezing [Cough] : no cough [SOB on Exertion] : no shortness of breath during exertion [Abdominal Pain] : no abdominal pain [Vomiting] : no vomiting [Constipation] : no constipation [Dysuria] : no dysuria [Vaginal Discharge] : no vaginal discharge [Dysmenorrhea/Abn Vaginal Bleeding] : no dysmenorrhea/abnormal vaginal bleeding [Joint Stiffness] : no joint stiffness [Skin Rash] : no skin rash [Skin Wound] : no skin wound [Confused] : no confusion [Dizziness] : no dizziness [Fainting] : no fainting [Difficulty Walking] : no difficulty walking [Suicidal] : not suicidal [Anxiety] : no anxiety [Depression] : no depression [Proptosis] : no proptosis [Hot Flashes] : no hot flashes [Muscle Weakness] : no muscle weakness [Deepening Of The Voice] : no deepening of the voice [Easy Bleeding] : no tendency for easy bleeding [Easy Bruising] : no tendency for easy bruising [Swollen Glands] : no swollen glands [FreeTextEntry2] : able to take care of ADLs, dances daily to music  [FreeTextEntry3] : wears corrective lenses, has Glaucoma uses eye drops per Ophtho recommendations  [FreeTextEntry8] : wears pull ups for incontinence, denies hematuria, urgency, frequency, denies any vaginal bleeding [FreeTextEntry9] : chronic arthritis in lower extremities not limiting her ADLs  [de-identified] : intermittent insomnia doesn't take any medications

## 2024-04-17 NOTE — PHYSICAL EXAM
[Fully active, able to carry on all pre-disease performance without restriction] : Status 0 - Fully active, able to carry on all pre-disease performance without restriction [Normal] : affect appropriate [Ulcers] : no ulcers [Mucositis] : no mucositis [Thrush] : no thrush [Vesicles] : no vesicles [de-identified] : wears corrective lenses  [de-identified] : anterior abdominal wall right side of abdomen darkening of skin - pt reports was biopsied in the past, uses steroid cream prn [de-identified] : wore bilateral lower extremities compression stockings.  [de-identified] : no gait instability, no gross sensory / motor deficits noted

## 2024-04-17 NOTE — REASON FOR VISIT
[Follow-Up Visit] : a follow-up visit for [Blood Count Assessment] : blood count assessment [Chronic Leukemia] : chronic leukemia [FreeTextEntry2] : 3 month evaluation for chronic myelogenous leukemia

## 2024-04-17 NOTE — ASSESSMENT
[Supportive] : Goals of care discussed with patient: Supportive [Palliative Care Plan] : not applicable at this time [FreeTextEntry1] : HANG Al is a 78-year-old female with a 122-month history of diagnosis and treatment of chronic myeloid leukemia; she is currently receiving treatment with nilotinib, and she is tolerating therapy well.  She remains in a hematologic complete response and a very good molecular response. No fatigue no weakness and no rise in platelet count. No recent history of bleeding or thrombosis and she has received vaccination against COV 2 without thrombotic tendency underscoring the safety of vaccination. Her baseline functional status is without change. She has had fatigue and intermittent leg cramping and RLE swelling and poorly controlled BP.  Her PE is significant for mild ankle swelling on the right. 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   Plan:  Venous Doppler bilateral LE negative Hypokalemia after holding spironolactone in October. Now clinically stable. She has not followed up with Dr Jay this January.  CBC and BCR ABL requested 2/2/2022 as her daughter is driving into office for her appointment. RTC 3 months PRE V 06/08/2022 - Patient feels well. Her blood pressure was high today - she did not take her am blood pressure medications as she takes it with food and did not eat breakfast as she was getting her blood work done.  Patient advised to take her blood pressure medication as prescribed and advised not to skip doses.  Will repeat CBC, CMP and BCR/ ABL levels today.  Her Chronic Myeloid Leukemia is well controlled on current therapy, will continue Nilotinib 150 mg PO capsules - 2 capsules in am & 1 in pm.  Medication & Physician follow up compliance emphasized. Patient verbalized understanding, all questions, concerns were addressed during this visit. Patient was seen, examined and evaluated with Dr Rios Orellana. 07/20/2022 Discussion with Young; she has been missing dosing of the Tasigna; She has missed as much as one month of medication. At times she forgets or feels tired to take medication now after ten years. One prior relapse after 2 years of treatment (eight years ago). She denies depression and has blanca in caring for her grand daughter. 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 CBC and WBC is OK today. It is probable that the rise in BCR ABL reflected non use of medication rather than genetic resistance RTC in 6 week BCR ABL testing is requested today. 12/20/2022 Now 77 years of age. no hospitalzation; she has been taking medication daily. Less depression; helps care for . Has an assistant for  care No falls. No infection. will check BCR ABL today; last level (with missed dosing in 09/01/2022) BCR ABL 0.4% Renewal of Tasigna RTC 3 months 03/21/2023 she is now 11 years from diagnosis as she presents with a high platelet count; now stable blood counts and unchanged physical examination; we will request CXR to monitor if there is evidence of fluid accumulation.  September 29, 2023 - Patient is a 78 y / o black female, w h/o HTN, Bronchiectasis of RUL, Seborrheic Keratosis, Skin papilloma, OA, Glaucoma, Thrombocytosis s/p Plasmapheresis, Chronic Myeloid Leukemia since April 2012 remains on Nilotinib.  She feels well, remains asymptomatic. Today's CBC, CMP WNL, BCR-ABL levels results pending. Results of CBC d/w patient - copy provided.  Continue Nilotinib - pt reports has medication with refills at the current time.  CT Chest from May 2023 - Abnormality noted on the patient's recent chest radiograph is not present on the CT scan. Mild bronchiectasis in the right upper lobe. Medication compliance, physician f/u recommendations advised, all questions, concerns were addressed with patient during this visit to her satisfaction, she verbalized understanding.  RTC in 4 months or sooner prn Case management, care plan discussed with Dr. Orellana. 04/17/2024 approximately 118 months from diagnosis and now on nilotinib after imatinib since 2015 (84 months0. Initially presentation with elevation of platelet count and white cell count. Her daughter has recently tested positive for CALR and negative for BCR ABL. I shall request CALR testing for Davina today. No edema or shortness of breath.  To repeat and track overall BCR ABL expression last noted 0.033% very good molecular response over nearly 118 months.  She is tired form extensive home care for her  who had a CVA approximatley 9 years in 2015. RTC in 6 months

## 2024-04-17 NOTE — RESULTS/DATA
[FreeTextEntry1] : review of blood testing from 10/2021; normal CBC  BCR/ABL less than 0.40 log 06/08/2022: CBC WBC = 5.86 Hgb 12.8  HCT = 40.2  PLT [id number], MCV; normal differential WBC BCR ABL 4% in contrast to 0.004 in 10/21 07/20/2022: CBC: WBC 5.83 HGB 12.3 PL [id number] 09/01/2022 BCR ABL positive 0.4 log 12/20/2022 CBC WBC 6.37 HGB 12.9  000 12/20/2023 BCR ABL < 0.03 log  03/21/2023 CBC WBC 4.71 HGB 12.9 MCV 88  000 04/17/2024 CBC WBC 5.22 HGB 12.9g/dL MCV 89 PL 2014  May 18, 2023 - CT Chest - Abnormality noted on the patient's recent chest radiograph is not present on the CT scan. Mild bronchiectasis in the right upper lobe.

## 2024-04-17 NOTE — HISTORY OF PRESENT ILLNESS
[Disease:__________________________] : Disease: [unfilled] [Treatment Protocol] : Treatment Protocol [Date: ____________] : Patient's last distress assessment performed on [unfilled]. [0 - No Distress] : Distress Level: 0 [100: Normal, no complaints, no evidence of disease.] : 100: Normal, no complaints, no evidence of disease. [ECOG Performance Status: 0 - Fully active, able to carry on all pre-disease performance without restriction] : Performance Status: 0 - Fully active, able to carry on all pre-disease performance without restriction [de-identified] : 78-year old female with past medical history of hypertension presenting to [facility] for hematologic care.  Patient was initially diagnosed with CML in April 2012. She was admitted to [facility] and was seen by Dr Bland of the transfusion service who performed several pheresis procedures to reduce a high platelet count (approximately 3,000,000). She was subsequently started on treatment with imatinib 400 mg PO daily and she was in compliance with treatment for three years. She discontinued treatment on her volition in early 2015.   On March 2015, she was reportedly feeling more tired with anorexia weight loss and presented at [facility] with thrombocytosis (2.1 million platelet count). She was admitted with platelet count of 2,088K and underwent multiple cycles of plasmaphereses with improvement of thrombocytosis and begun on nilotinib and hydroxyurea. She stated at the time of admission that she stopped taking Gleevec as well as other antihypertensives as she felt she did not need them. Her hospital course was complicated by herpes zoster with resolution on Valtrex as well as APC's which resolved with metoprolol. She was ultimately discharged on 4/17/2015. She has tolerated the nilotinib without side effects. [FreeTextEntry1] : nilotinib 150 mg tablets PO 2 tablet in AM, 1 tablet in PM [de-identified] : feels well; she is fully vaccinated against COV 2 and has had booster. No symptoms of infection. She is residing at home 06/08/2022 - Patient was seen in a follow up visit accompanied by her granddaughter Shellie.  Patient feels well denies any weight loss, fever, chills, nausea, vomiting, headaches, visual disturbances, shortness of breath, abdominal pain, constipation, diarrhea, bleeding, bruising. Since her last visit with us she denies any COVID infection, hospitalization or surgeries. She has received Pfizer vaccination for COVID-19 including booster doses.  She was having chronic Right foot and ankle pain and Left knee pain was seen by Orthopedics today, reports no fractures will be treated medically.  She denies any falls, gait instability, use of any assist devices to ambulate.  Today on routine vitals in our office her BP was high 157/ 91 mmHg pt asymptomatic. States she takes her medications with breakfast and this morning did not eat or take any medications as she had an appointment for her blood work. She has been compliant with her medications and her physician follow ups.   She lives with her  and other family members. She is able to carry out her household tasks cooking, cleaning without any difficulties.  07/20/2022 No change from above recent activities and no weight loss and no fever. She tells me that she has been missing dosing of medication doses including one full month of no therapy 12/20/2022 she has told me that she had been taking medication daily and has not skipped dosing. Normal CBC today. No falls and no hospitalzation. She now has six great grand children. She is seeing Dr Peters She has help with her  for home care. September 29, 2023 - Seen in a f/u visit today, presented to the office by herself her neighbor / friend drove her to this appointment. She feels well, denies any interval change in medical condition, febrile illness, hospitalization, falls, no constitutional symptoms, reports good appetite, no swollen glands, weight loss, night sweats.  Reports compliance with medications takes Nilotinib as prescribed, has refills on current prescription. She takes care of her  who is limited due to his illness. She likes to dance to music daily and enjoys it as frequently as possible.  04/17/2024 seen at interval of six months. no hospitalization and she has been taking her medication regularly. BCR ABL last determination 0.033%. She is engaging in care of  regularily. No transfusion.

## 2024-04-22 LAB
GENE XXX MUT ANL BLD/T: NORMAL
T(9;22)(ABL1,BCR)/CONTROL BLD/T: NORMAL

## 2024-05-23 ENCOUNTER — NON-APPOINTMENT (OUTPATIENT)
Age: 79
End: 2024-05-23

## 2024-05-23 ENCOUNTER — APPOINTMENT (OUTPATIENT)
Dept: OPHTHALMOLOGY | Facility: CLINIC | Age: 79
End: 2024-05-23
Payer: MEDICARE

## 2024-05-23 PROCEDURE — 92012 INTRM OPH EXAM EST PATIENT: CPT

## 2024-10-07 ENCOUNTER — OUTPATIENT (OUTPATIENT)
Dept: OUTPATIENT SERVICES | Facility: HOSPITAL | Age: 79
LOS: 1 days | Discharge: HOME | End: 2024-10-07

## 2024-10-07 DIAGNOSIS — Z98.41 CATARACT EXTRACTION STATUS, RIGHT EYE: Chronic | ICD-10-CM

## 2024-10-07 DIAGNOSIS — C93.10 CHRONIC MYELOMONOCYTIC LEUKEMIA NOT HAVING ACHIEVED REMISSION: ICD-10-CM

## 2024-10-18 ENCOUNTER — RESULT REVIEW (OUTPATIENT)
Age: 79
End: 2024-10-18

## 2024-10-18 ENCOUNTER — APPOINTMENT (OUTPATIENT)
Dept: HEMATOLOGY ONCOLOGY | Facility: CLINIC | Age: 79
End: 2024-10-18
Payer: MEDICARE

## 2024-10-18 VITALS
BODY MASS INDEX: 29.41 KG/M2 | OXYGEN SATURATION: 99 % | SYSTOLIC BLOOD PRESSURE: 183 MMHG | TEMPERATURE: 97.8 F | RESPIRATION RATE: 16 BRPM | HEART RATE: 80 BPM | DIASTOLIC BLOOD PRESSURE: 102 MMHG | WEIGHT: 155.62 LBS

## 2024-10-18 DIAGNOSIS — D47.3 ESSENTIAL (HEMORRHAGIC) THROMBOCYTHEMIA: ICD-10-CM

## 2024-10-18 DIAGNOSIS — C92.10 CHRONIC MYELOID LEUKEMIA, BCR/ABL-POSITIVE, NOT HAVING ACHIEVED REMISSION: ICD-10-CM

## 2024-10-18 LAB
BASOPHILS # BLD AUTO: 0.14 K/UL — SIGNIFICANT CHANGE UP (ref 0–0.2)
BASOPHILS NFR BLD AUTO: 2.6 % — HIGH (ref 0–2)
EOSINOPHIL # BLD AUTO: 0.1 K/UL — SIGNIFICANT CHANGE UP (ref 0–0.5)
EOSINOPHIL NFR BLD AUTO: 1.9 % — SIGNIFICANT CHANGE UP (ref 0–6)
HCT VFR BLD CALC: 38.6 % — SIGNIFICANT CHANGE UP (ref 34.5–45)
HGB BLD-MCNC: 12.5 G/DL — SIGNIFICANT CHANGE UP (ref 11.5–15.5)
IMM GRANULOCYTES NFR BLD AUTO: 0.4 % — SIGNIFICANT CHANGE UP (ref 0–0.9)
LG PLATELETS BLD QL AUTO: SLIGHT — SIGNIFICANT CHANGE UP
LYMPHOCYTES # BLD AUTO: 1.81 K/UL — SIGNIFICANT CHANGE UP (ref 1–3.3)
LYMPHOCYTES # BLD AUTO: 33.5 % — SIGNIFICANT CHANGE UP (ref 13–44)
MCHC RBC-ENTMCNC: 29 PG — SIGNIFICANT CHANGE UP (ref 27–34)
MCHC RBC-ENTMCNC: 32.4 G/DL — SIGNIFICANT CHANGE UP (ref 32–36)
MCV RBC AUTO: 89.6 FL — SIGNIFICANT CHANGE UP (ref 80–100)
MONOCYTES # BLD AUTO: 0.33 K/UL — SIGNIFICANT CHANGE UP (ref 0–0.9)
MONOCYTES NFR BLD AUTO: 6.1 % — SIGNIFICANT CHANGE UP (ref 2–14)
NEUTROPHILS # BLD AUTO: 3 K/UL — SIGNIFICANT CHANGE UP (ref 1.8–7.4)
NEUTROPHILS NFR BLD AUTO: 55.5 % — SIGNIFICANT CHANGE UP (ref 43–77)
NRBC # BLD: 0 /100 WBCS — SIGNIFICANT CHANGE UP (ref 0–0)
NRBC BLD-RTO: 0 /100 WBCS — SIGNIFICANT CHANGE UP (ref 0–0)
PLAT MORPH BLD: ABNORMAL
PLATELET # BLD AUTO: 303 K/UL — SIGNIFICANT CHANGE UP (ref 150–400)
RBC # BLD: 4.31 M/UL — SIGNIFICANT CHANGE UP (ref 3.8–5.2)
RBC # FLD: 14.6 % — HIGH (ref 10.3–14.5)
RBC BLD AUTO: SIGNIFICANT CHANGE UP
WBC # BLD: 5.4 K/UL — SIGNIFICANT CHANGE UP (ref 3.8–10.5)
WBC # FLD AUTO: 5.4 K/UL — SIGNIFICANT CHANGE UP (ref 3.8–10.5)

## 2024-10-18 PROCEDURE — G2211 COMPLEX E/M VISIT ADD ON: CPT

## 2024-10-18 PROCEDURE — 99214 OFFICE O/P EST MOD 30 MIN: CPT

## 2024-10-24 LAB — T(9;22)(ABL1,BCR)/CONTROL BLD/T: NORMAL

## 2024-11-14 ENCOUNTER — APPOINTMENT (OUTPATIENT)
Dept: OPHTHALMOLOGY | Facility: CLINIC | Age: 79
End: 2024-11-14
Payer: MEDICARE

## 2024-11-14 ENCOUNTER — NON-APPOINTMENT (OUTPATIENT)
Age: 79
End: 2024-11-14

## 2024-11-14 PROCEDURE — 92012 INTRM OPH EXAM EST PATIENT: CPT

## 2024-11-14 PROCEDURE — 92083 EXTENDED VISUAL FIELD XM: CPT

## 2024-11-14 PROCEDURE — 92133 CPTRZD OPH DX IMG PST SGM ON: CPT

## 2024-11-20 ENCOUNTER — NON-APPOINTMENT (OUTPATIENT)
Age: 79
End: 2024-11-20

## 2024-11-29 ENCOUNTER — RESULT REVIEW (OUTPATIENT)
Age: 79
End: 2024-11-29

## 2024-11-29 ENCOUNTER — APPOINTMENT (OUTPATIENT)
Dept: HEMATOLOGY ONCOLOGY | Facility: CLINIC | Age: 79
End: 2024-11-29

## 2024-11-29 DIAGNOSIS — D47.3 ESSENTIAL (HEMORRHAGIC) THROMBOCYTHEMIA: ICD-10-CM

## 2024-11-29 LAB
BASOPHILS # BLD AUTO: 0.15 K/UL — SIGNIFICANT CHANGE UP (ref 0–0.2)
BASOPHILS NFR BLD AUTO: 2.5 % — HIGH (ref 0–2)
EOSINOPHIL # BLD AUTO: 0.05 K/UL — SIGNIFICANT CHANGE UP (ref 0–0.5)
EOSINOPHIL NFR BLD AUTO: 0.8 % — SIGNIFICANT CHANGE UP (ref 0–6)
HCT VFR BLD CALC: 36.7 % — SIGNIFICANT CHANGE UP (ref 34.5–45)
HGB BLD-MCNC: 11.9 G/DL — SIGNIFICANT CHANGE UP (ref 11.5–15.5)
IMM GRANULOCYTES NFR BLD AUTO: 0.3 % — SIGNIFICANT CHANGE UP (ref 0–0.9)
LYMPHOCYTES # BLD AUTO: 1.86 K/UL — SIGNIFICANT CHANGE UP (ref 1–3.3)
LYMPHOCYTES # BLD AUTO: 31 % — SIGNIFICANT CHANGE UP (ref 13–44)
MCHC RBC-ENTMCNC: 29.5 PG — SIGNIFICANT CHANGE UP (ref 27–34)
MCHC RBC-ENTMCNC: 32.4 G/DL — SIGNIFICANT CHANGE UP (ref 32–36)
MCV RBC AUTO: 91.1 FL — SIGNIFICANT CHANGE UP (ref 80–100)
MONOCYTES # BLD AUTO: 0.51 K/UL — SIGNIFICANT CHANGE UP (ref 0–0.9)
MONOCYTES NFR BLD AUTO: 8.5 % — SIGNIFICANT CHANGE UP (ref 2–14)
NEUTROPHILS # BLD AUTO: 3.41 K/UL — SIGNIFICANT CHANGE UP (ref 1.8–7.4)
NEUTROPHILS NFR BLD AUTO: 56.9 % — SIGNIFICANT CHANGE UP (ref 43–77)
NRBC # BLD: 0 /100 WBCS — SIGNIFICANT CHANGE UP (ref 0–0)
NRBC BLD-RTO: 0 /100 WBCS — SIGNIFICANT CHANGE UP (ref 0–0)
PLATELET # BLD AUTO: 440 K/UL — HIGH (ref 150–400)
RBC # BLD: 4.03 M/UL — SIGNIFICANT CHANGE UP (ref 3.8–5.2)
RBC # FLD: 14.8 % — HIGH (ref 10.3–14.5)
WBC # BLD: 6 K/UL — SIGNIFICANT CHANGE UP (ref 3.8–10.5)
WBC # FLD AUTO: 6 K/UL — SIGNIFICANT CHANGE UP (ref 3.8–10.5)

## 2024-12-04 LAB — T(9;22)(ABL1,BCR)/CONTROL BLD/T: NORMAL

## 2025-04-01 ENCOUNTER — APPOINTMENT (OUTPATIENT)
Dept: HEMATOLOGY ONCOLOGY | Facility: CLINIC | Age: 80
End: 2025-04-01

## 2025-04-18 ENCOUNTER — NON-APPOINTMENT (OUTPATIENT)
Age: 80
End: 2025-04-18

## 2025-04-24 ENCOUNTER — OUTPATIENT (OUTPATIENT)
Dept: OUTPATIENT SERVICES | Facility: HOSPITAL | Age: 80
LOS: 1 days | Discharge: HOME | End: 2025-04-24

## 2025-04-24 DIAGNOSIS — Z98.41 CATARACT EXTRACTION STATUS, RIGHT EYE: Chronic | ICD-10-CM

## 2025-04-24 DIAGNOSIS — C93.10 CHRONIC MYELOMONOCYTIC LEUKEMIA NOT HAVING ACHIEVED REMISSION: ICD-10-CM

## 2025-04-25 ENCOUNTER — APPOINTMENT (OUTPATIENT)
Dept: HEMATOLOGY ONCOLOGY | Facility: CLINIC | Age: 80
End: 2025-04-25

## 2025-04-25 ENCOUNTER — LABORATORY RESULT (OUTPATIENT)
Age: 80
End: 2025-04-25

## 2025-04-25 ENCOUNTER — RESULT REVIEW (OUTPATIENT)
Age: 80
End: 2025-04-25

## 2025-04-25 VITALS
HEART RATE: 58 BPM | WEIGHT: 154 LBS | TEMPERATURE: 97.8 F | DIASTOLIC BLOOD PRESSURE: 83 MMHG | BODY MASS INDEX: 29.1 KG/M2 | RESPIRATION RATE: 16 BRPM | SYSTOLIC BLOOD PRESSURE: 143 MMHG | OXYGEN SATURATION: 100 %

## 2025-04-25 DIAGNOSIS — C92.10 CHRONIC MYELOID LEUKEMIA, BCR/ABL-POSITIVE, NOT HAVING ACHIEVED REMISSION: ICD-10-CM

## 2025-04-25 DIAGNOSIS — D47.3 ESSENTIAL (HEMORRHAGIC) THROMBOCYTHEMIA: ICD-10-CM

## 2025-04-25 DIAGNOSIS — I10 ESSENTIAL (PRIMARY) HYPERTENSION: ICD-10-CM

## 2025-04-25 LAB
BASOPHILS # BLD AUTO: 0.05 K/UL — SIGNIFICANT CHANGE UP (ref 0–0.2)
BASOPHILS NFR BLD AUTO: 1 % — SIGNIFICANT CHANGE UP (ref 0–2)
EOSINOPHIL # BLD AUTO: 0.12 K/UL — SIGNIFICANT CHANGE UP (ref 0–0.5)
EOSINOPHIL NFR BLD AUTO: 2.3 % — SIGNIFICANT CHANGE UP (ref 0–6)
HCT VFR BLD CALC: 36.6 % — SIGNIFICANT CHANGE UP (ref 34.5–45)
HGB BLD-MCNC: 11.7 G/DL — SIGNIFICANT CHANGE UP (ref 11.5–15.5)
IMM GRANULOCYTES NFR BLD AUTO: 0.2 % — SIGNIFICANT CHANGE UP (ref 0–0.9)
LYMPHOCYTES # BLD AUTO: 1.57 K/UL — SIGNIFICANT CHANGE UP (ref 1–3.3)
LYMPHOCYTES # BLD AUTO: 30.1 % — SIGNIFICANT CHANGE UP (ref 13–44)
MCHC RBC-ENTMCNC: 28.4 PG — SIGNIFICANT CHANGE UP (ref 27–34)
MCHC RBC-ENTMCNC: 32 G/DL — SIGNIFICANT CHANGE UP (ref 32–36)
MCV RBC AUTO: 88.8 FL — SIGNIFICANT CHANGE UP (ref 80–100)
MONOCYTES # BLD AUTO: 0.3 K/UL — SIGNIFICANT CHANGE UP (ref 0–0.9)
MONOCYTES NFR BLD AUTO: 5.8 % — SIGNIFICANT CHANGE UP (ref 2–14)
NEUTROPHILS # BLD AUTO: 3.16 K/UL — SIGNIFICANT CHANGE UP (ref 1.8–7.4)
NEUTROPHILS NFR BLD AUTO: 60.6 % — SIGNIFICANT CHANGE UP (ref 43–77)
NRBC BLD AUTO-RTO: 0 /100 WBCS — SIGNIFICANT CHANGE UP (ref 0–0)
PLATELET # BLD AUTO: 233 K/UL — SIGNIFICANT CHANGE UP (ref 150–400)
RBC # BLD: 4.12 M/UL — SIGNIFICANT CHANGE UP (ref 3.8–5.2)
RBC # FLD: 14.1 % — SIGNIFICANT CHANGE UP (ref 10.3–14.5)
WBC # BLD: 5.21 K/UL — SIGNIFICANT CHANGE UP (ref 3.8–10.5)
WBC # FLD AUTO: 5.21 K/UL — SIGNIFICANT CHANGE UP (ref 3.8–10.5)

## 2025-04-25 PROCEDURE — 99215 OFFICE O/P EST HI 40 MIN: CPT

## 2025-04-25 PROCEDURE — G2211 COMPLEX E/M VISIT ADD ON: CPT

## 2025-05-15 ENCOUNTER — NON-APPOINTMENT (OUTPATIENT)
Age: 80
End: 2025-05-15

## 2025-05-15 ENCOUNTER — APPOINTMENT (OUTPATIENT)
Dept: HEMATOLOGY ONCOLOGY | Facility: CLINIC | Age: 80
End: 2025-05-15

## 2025-05-15 ENCOUNTER — RESULT REVIEW (OUTPATIENT)
Age: 80
End: 2025-05-15

## 2025-05-15 VITALS
TEMPERATURE: 97.3 F | BODY MASS INDEX: 29.58 KG/M2 | SYSTOLIC BLOOD PRESSURE: 151 MMHG | OXYGEN SATURATION: 99 % | HEART RATE: 69 BPM | WEIGHT: 156.53 LBS | RESPIRATION RATE: 16 BRPM | DIASTOLIC BLOOD PRESSURE: 86 MMHG

## 2025-05-15 DIAGNOSIS — R79.89 OTHER SPECIFIED ABNORMAL FINDINGS OF BLOOD CHEMISTRY: ICD-10-CM

## 2025-05-15 DIAGNOSIS — L30.9 DERMATITIS, UNSPECIFIED: ICD-10-CM

## 2025-05-15 DIAGNOSIS — C92.10 CHRONIC MYELOID LEUKEMIA, BCR/ABL-POSITIVE, NOT HAVING ACHIEVED REMISSION: ICD-10-CM

## 2025-05-15 DIAGNOSIS — D47.3 ESSENTIAL (HEMORRHAGIC) THROMBOCYTHEMIA: ICD-10-CM

## 2025-05-15 LAB
BASOPHILS # BLD AUTO: 0.03 K/UL — SIGNIFICANT CHANGE UP (ref 0–0.2)
BASOPHILS NFR BLD AUTO: 0.5 % — SIGNIFICANT CHANGE UP (ref 0–2)
EOSINOPHIL # BLD AUTO: 0.1 K/UL — SIGNIFICANT CHANGE UP (ref 0–0.5)
EOSINOPHIL NFR BLD AUTO: 1.8 % — SIGNIFICANT CHANGE UP (ref 0–6)
HCT VFR BLD CALC: 36.3 % — SIGNIFICANT CHANGE UP (ref 34.5–45)
HGB BLD-MCNC: 11.7 G/DL — SIGNIFICANT CHANGE UP (ref 11.5–15.5)
IMM GRANULOCYTES NFR BLD AUTO: 0.2 % — SIGNIFICANT CHANGE UP (ref 0–0.9)
LYMPHOCYTES # BLD AUTO: 1.84 K/UL — SIGNIFICANT CHANGE UP (ref 1–3.3)
LYMPHOCYTES # BLD AUTO: 33 % — SIGNIFICANT CHANGE UP (ref 13–44)
MCHC RBC-ENTMCNC: 28.5 PG — SIGNIFICANT CHANGE UP (ref 27–34)
MCHC RBC-ENTMCNC: 32.2 G/DL — SIGNIFICANT CHANGE UP (ref 32–36)
MCV RBC AUTO: 88.3 FL — SIGNIFICANT CHANGE UP (ref 80–100)
MONOCYTES # BLD AUTO: 0.45 K/UL — SIGNIFICANT CHANGE UP (ref 0–0.9)
MONOCYTES NFR BLD AUTO: 8.1 % — SIGNIFICANT CHANGE UP (ref 2–14)
NEUTROPHILS # BLD AUTO: 3.15 K/UL — SIGNIFICANT CHANGE UP (ref 1.8–7.4)
NEUTROPHILS NFR BLD AUTO: 56.4 % — SIGNIFICANT CHANGE UP (ref 43–77)
NRBC BLD AUTO-RTO: 0 /100 WBCS — SIGNIFICANT CHANGE UP (ref 0–0)
PLATELET # BLD AUTO: 190 K/UL — SIGNIFICANT CHANGE UP (ref 150–400)
RBC # BLD: 4.11 M/UL — SIGNIFICANT CHANGE UP (ref 3.8–5.2)
RBC # FLD: 14.4 % — SIGNIFICANT CHANGE UP (ref 10.3–14.5)
WBC # BLD: 5.58 K/UL — SIGNIFICANT CHANGE UP (ref 3.8–10.5)
WBC # FLD AUTO: 5.58 K/UL — SIGNIFICANT CHANGE UP (ref 3.8–10.5)

## 2025-05-15 PROCEDURE — G2211 COMPLEX E/M VISIT ADD ON: CPT

## 2025-05-15 PROCEDURE — 99214 OFFICE O/P EST MOD 30 MIN: CPT

## 2025-05-21 LAB — T(9;22)(ABL1,BCR)/CONTROL BLD/T: NORMAL

## 2025-05-22 ENCOUNTER — APPOINTMENT (OUTPATIENT)
Dept: OPHTHALMOLOGY | Facility: CLINIC | Age: 80
End: 2025-05-22
Payer: MEDICARE

## 2025-05-22 ENCOUNTER — NON-APPOINTMENT (OUTPATIENT)
Age: 80
End: 2025-05-22

## 2025-05-22 PROCEDURE — 92250 FUNDUS PHOTOGRAPHY W/I&R: CPT

## 2025-05-22 PROCEDURE — 92014 COMPRE OPH EXAM EST PT 1/>: CPT

## 2025-05-22 PROCEDURE — 92020 GONIOSCOPY: CPT

## 2025-06-25 ENCOUNTER — OUTPATIENT (OUTPATIENT)
Dept: OUTPATIENT SERVICES | Facility: HOSPITAL | Age: 80
LOS: 1 days | Discharge: HOME | End: 2025-06-25

## 2025-06-25 DIAGNOSIS — Z98.41 CATARACT EXTRACTION STATUS, RIGHT EYE: Chronic | ICD-10-CM

## 2025-06-25 DIAGNOSIS — C93.10 CHRONIC MYELOMONOCYTIC LEUKEMIA NOT HAVING ACHIEVED REMISSION: ICD-10-CM

## 2025-06-26 ENCOUNTER — APPOINTMENT (OUTPATIENT)
Dept: HEMATOLOGY ONCOLOGY | Facility: CLINIC | Age: 80
End: 2025-06-26

## 2025-07-15 ENCOUNTER — APPOINTMENT (OUTPATIENT)
Dept: HEMATOLOGY ONCOLOGY | Facility: CLINIC | Age: 80
End: 2025-07-15
Payer: MEDICARE

## 2025-07-15 ENCOUNTER — RESULT REVIEW (OUTPATIENT)
Age: 80
End: 2025-07-15

## 2025-07-15 VITALS
BODY MASS INDEX: 29.78 KG/M2 | TEMPERATURE: 97.3 F | HEART RATE: 80 BPM | SYSTOLIC BLOOD PRESSURE: 147 MMHG | RESPIRATION RATE: 16 BRPM | WEIGHT: 157.63 LBS | OXYGEN SATURATION: 98 % | DIASTOLIC BLOOD PRESSURE: 84 MMHG

## 2025-07-15 LAB
BASOPHILS # BLD AUTO: 0.04 K/UL — SIGNIFICANT CHANGE UP (ref 0–0.2)
BASOPHILS NFR BLD AUTO: 0.8 % — SIGNIFICANT CHANGE UP (ref 0–2)
EOSINOPHIL # BLD AUTO: 0.11 K/UL — SIGNIFICANT CHANGE UP (ref 0–0.5)
EOSINOPHIL NFR BLD AUTO: 2.1 % — SIGNIFICANT CHANGE UP (ref 0–6)
HCT VFR BLD CALC: 41.9 % — SIGNIFICANT CHANGE UP (ref 34.5–45)
HGB BLD-MCNC: 13.3 G/DL — SIGNIFICANT CHANGE UP (ref 11.5–15.5)
IMM GRANULOCYTES NFR BLD AUTO: 0.2 % — SIGNIFICANT CHANGE UP (ref 0–0.9)
LYMPHOCYTES # BLD AUTO: 1.71 K/UL — SIGNIFICANT CHANGE UP (ref 1–3.3)
LYMPHOCYTES # BLD AUTO: 32.9 % — SIGNIFICANT CHANGE UP (ref 13–44)
MCHC RBC-ENTMCNC: 28 PG — SIGNIFICANT CHANGE UP (ref 27–34)
MCHC RBC-ENTMCNC: 31.7 G/DL — LOW (ref 32–36)
MCV RBC AUTO: 88.2 FL — SIGNIFICANT CHANGE UP (ref 80–100)
MONOCYTES # BLD AUTO: 0.51 K/UL — SIGNIFICANT CHANGE UP (ref 0–0.9)
MONOCYTES NFR BLD AUTO: 9.8 % — SIGNIFICANT CHANGE UP (ref 2–14)
NEUTROPHILS # BLD AUTO: 2.81 K/UL — SIGNIFICANT CHANGE UP (ref 1.8–7.4)
NEUTROPHILS NFR BLD AUTO: 54.2 % — SIGNIFICANT CHANGE UP (ref 43–77)
NRBC BLD AUTO-RTO: 0 /100 WBCS — SIGNIFICANT CHANGE UP (ref 0–0)
PLATELET # BLD AUTO: 189 K/UL — SIGNIFICANT CHANGE UP (ref 150–400)
RBC # BLD: 4.75 M/UL — SIGNIFICANT CHANGE UP (ref 3.8–5.2)
RBC # FLD: 13.7 % — SIGNIFICANT CHANGE UP (ref 10.3–14.5)
WBC # BLD: 5.19 K/UL — SIGNIFICANT CHANGE UP (ref 3.8–10.5)
WBC # FLD AUTO: 5.19 K/UL — SIGNIFICANT CHANGE UP (ref 3.8–10.5)

## 2025-07-15 PROCEDURE — 99214 OFFICE O/P EST MOD 30 MIN: CPT

## 2025-07-15 PROCEDURE — G2211 COMPLEX E/M VISIT ADD ON: CPT

## 2025-07-16 LAB
ALBUMIN SERPL ELPH-MCNC: 4.4 G/DL
ALP BLD-CCNC: 64 U/L
ALT SERPL-CCNC: 18 U/L
ANION GAP SERPL CALC-SCNC: 14 MMOL/L
AST SERPL-CCNC: 23 U/L
BILIRUB SERPL-MCNC: 0.8 MG/DL
BUN SERPL-MCNC: 9 MG/DL
CALCIUM SERPL-MCNC: 9.9 MG/DL
CHLORIDE SERPL-SCNC: 100 MMOL/L
CO2 SERPL-SCNC: 28 MMOL/L
CREAT SERPL-MCNC: 0.74 MG/DL
EGFRCR SERPLBLD CKD-EPI 2021: 81 ML/MIN/1.73M2
GLUCOSE SERPL-MCNC: 87 MG/DL
POTASSIUM SERPL-SCNC: 3.7 MMOL/L
PROT SERPL-MCNC: 7.3 G/DL
SODIUM SERPL-SCNC: 142 MMOL/L

## 2025-07-18 LAB — T(9;22)(ABL1,BCR)/CONTROL BLD/T: NORMAL

## 2025-08-22 ENCOUNTER — NON-APPOINTMENT (OUTPATIENT)
Age: 80
End: 2025-08-22